# Patient Record
Sex: MALE | Race: WHITE | Employment: FULL TIME | URBAN - METROPOLITAN AREA
[De-identification: names, ages, dates, MRNs, and addresses within clinical notes are randomized per-mention and may not be internally consistent; named-entity substitution may affect disease eponyms.]

---

## 2018-07-23 ENCOUNTER — OFFICE VISIT (OUTPATIENT)
Dept: FAMILY MEDICINE CLINIC | Age: 53
End: 2018-07-23

## 2018-07-23 VITALS
SYSTOLIC BLOOD PRESSURE: 134 MMHG | OXYGEN SATURATION: 99 % | RESPIRATION RATE: 18 BRPM | DIASTOLIC BLOOD PRESSURE: 78 MMHG | HEART RATE: 74 BPM | WEIGHT: 234 LBS | BODY MASS INDEX: 30.03 KG/M2 | HEIGHT: 74 IN

## 2018-07-23 DIAGNOSIS — I10 ESSENTIAL HYPERTENSION: ICD-10-CM

## 2018-07-23 DIAGNOSIS — G60.0 CHARCOT-MARIE-TOOTH DISEASE: ICD-10-CM

## 2018-07-23 DIAGNOSIS — G60.9 HEREDITARY PERIPHERAL NEUROPATHY: ICD-10-CM

## 2018-07-23 DIAGNOSIS — E78.5 HYPERLIPIDEMIA, UNSPECIFIED HYPERLIPIDEMIA TYPE: ICD-10-CM

## 2018-07-23 PROCEDURE — 1036F TOBACCO NON-USER: CPT | Performed by: NURSE PRACTITIONER

## 2018-07-23 PROCEDURE — 3017F COLORECTAL CA SCREEN DOC REV: CPT | Performed by: NURSE PRACTITIONER

## 2018-07-23 PROCEDURE — G8417 CALC BMI ABV UP PARAM F/U: HCPCS | Performed by: NURSE PRACTITIONER

## 2018-07-23 PROCEDURE — G8427 DOCREV CUR MEDS BY ELIG CLIN: HCPCS | Performed by: NURSE PRACTITIONER

## 2018-07-23 PROCEDURE — 99203 OFFICE O/P NEW LOW 30 MIN: CPT | Performed by: NURSE PRACTITIONER

## 2018-07-23 RX ORDER — LISINOPRIL AND HYDROCHLOROTHIAZIDE 12.5; 1 MG/1; MG/1
1 TABLET ORAL DAILY
Qty: 90 TABLET | Refills: 1 | Status: SHIPPED | OUTPATIENT
Start: 2018-07-23 | End: 2019-02-02 | Stop reason: SDUPTHER

## 2018-07-23 RX ORDER — M-VIT,TX,IRON,MINS/CALC/FOLIC 27MG-0.4MG
1 TABLET ORAL DAILY
COMMUNITY

## 2018-07-23 RX ORDER — LISINOPRIL AND HYDROCHLOROTHIAZIDE 12.5; 1 MG/1; MG/1
1 TABLET ORAL DAILY
COMMUNITY
End: 2018-07-23 | Stop reason: SDUPTHER

## 2018-07-23 ASSESSMENT — ENCOUNTER SYMPTOMS
ORTHOPNEA: 0
COUGH: 0
CONSTIPATION: 0
SHORTNESS OF BREATH: 0
BLOOD IN STOOL: 0
DIARRHEA: 0
GASTROINTESTINAL NEGATIVE: 1
ABDOMINAL PAIN: 0

## 2018-07-23 ASSESSMENT — PATIENT HEALTH QUESTIONNAIRE - PHQ9
1. LITTLE INTEREST OR PLEASURE IN DOING THINGS: 0
SUM OF ALL RESPONSES TO PHQ9 QUESTIONS 1 & 2: 0
2. FEELING DOWN, DEPRESSED OR HOPELESS: 0
SUM OF ALL RESPONSES TO PHQ QUESTIONS 1-9: 0

## 2018-07-23 NOTE — PROGRESS NOTES
Michele Bamberger 46 y.o. male    Chief Complaint   Patient presents with    Hypertension    New Patient       HPI     New patient. HTN, on lisinopril- HCTZ 10-12.5 mg, running out, was saving medication, not taking as prescribed, /78 today. Tolerating medications, no chest pains, no SOB, edema. Hereditary peripheral neuropathy, Charcot-Liliana-Tooth disease, sees neurologist-has paresthesias in BUE, no weakness. Past Medical History:   Diagnosis Date    Charcot-Liliana-Tooth disease     Hyperlipemia     Hypertension      Past Surgical History:   Procedure Laterality Date    BREAST BIOPSY      COLONOSCOPY      repeat in 2022    VASECTOMY       No Known Allergies  Outpatient Prescriptions Marked as Taking for the 7/23/18 encounter (Office Visit) with GERRY Barrera CNP   Medication Sig Dispense Refill    Multiple Vitamins-Minerals (THERAPEUTIC MULTIVITAMIN-MINERALS) tablet Take 1 tablet by mouth daily      vitamin D (CHOLECALCIFEROL) 1000 UNIT TABS tablet Take 1,000 Units by mouth daily      lisinopril-hydrochlorothiazide (PRINZIDE;ZESTORETIC) 10-12.5 MG per tablet Take 1 tablet by mouth daily 90 tablet 1     Social History   Substance Use Topics    Smoking status: Never Smoker    Smokeless tobacco: Never Used    Alcohol use Yes     Family History   Problem Relation Age of Onset    Cancer Father         small cell carcinoma     Heart Disease Father         CHF    Other Mother         PAD, smoker, CMT disease    Diabetes Mother        /78   Pulse 74   Resp 18   Ht 6' 2\" (1.88 m)   Wt 234 lb (106.1 kg)   SpO2 99%   BMI 30.04 kg/m²   Weight: 234 lb (106.1 kg)     Past medical, surgical, family and social history were reviewed and updated with the patient. Review of Systems   Constitutional: Negative for malaise/fatigue. Respiratory: Negative for cough and shortness of breath.     Cardiovascular: Negative for chest pain, palpitations, orthopnea, claudication, leg swelling and PND. Gastrointestinal: Negative. Negative for abdominal pain, blood in stool, constipation and diarrhea. Genitourinary: Negative. Skin: Negative for rash. Neurological: Positive for tingling. Negative for dizziness, focal weakness, weakness and headaches. Psychiatric/Behavioral: Negative for depression. The patient is not nervous/anxious. Physical Exam   Constitutional: He is oriented to person, place, and time. He appears well-developed and well-nourished. No distress. Neck: Neck supple. No thyromegaly present. Cardiovascular: Normal rate, regular rhythm, normal heart sounds and intact distal pulses. No murmur heard. Pulmonary/Chest: Effort normal and breath sounds normal.   Abdominal: Soft. Bowel sounds are normal.   Lymphadenopathy:     He has no cervical adenopathy. Neurological: He is alert and oriented to person, place, and time. He displays atrophy. Abnormal muscle tone: BLE. Psychiatric: He has a normal mood and affect. His behavior is normal.   Nursing note and vitals reviewed. Assessment    1. Essential hypertension    2. Charcot-Liliana-Tooth disease    3. Hereditary peripheral neuropathy    4. Hyperlipidemia, unspecified hyperlipidemia type        Plan    Andrew Merchant was seen today for hypertension and new patient. Diagnoses and all orders for this visit:    Essential hypertension  -     Continue current, continue working on weight loss  -     lisinopril-hydrochlorothiazide (PRINZIDE;ZESTORETIC) 10-12.5 MG per tablet; Take 1 tablet by mouth daily    Charcot-Liliana-Tooth disease  Hereditary peripheral neuropathy         -    Sees neurologist     Hyperlipidemia, unspecified hyperlipidemia type          -   Counseled on diet changes, will recheck during physical in Sept, 2018.

## 2018-09-18 ENCOUNTER — OFFICE VISIT (OUTPATIENT)
Dept: FAMILY MEDICINE CLINIC | Age: 53
End: 2018-09-18

## 2018-09-18 VITALS
RESPIRATION RATE: 16 BRPM | HEIGHT: 74 IN | BODY MASS INDEX: 29.52 KG/M2 | DIASTOLIC BLOOD PRESSURE: 78 MMHG | SYSTOLIC BLOOD PRESSURE: 128 MMHG | WEIGHT: 230 LBS | HEART RATE: 70 BPM | OXYGEN SATURATION: 98 %

## 2018-09-18 DIAGNOSIS — I10 ESSENTIAL HYPERTENSION: ICD-10-CM

## 2018-09-18 DIAGNOSIS — L97.511 SKIN ULCER OF TOE OF RIGHT FOOT, LIMITED TO BREAKDOWN OF SKIN (HCC): ICD-10-CM

## 2018-09-18 DIAGNOSIS — G60.0 CHARCOT-MARIE-TOOTH DISEASE: ICD-10-CM

## 2018-09-18 DIAGNOSIS — L84 CALLUS OF FOOT: ICD-10-CM

## 2018-09-18 DIAGNOSIS — G60.9 HEREDITARY PERIPHERAL NEUROPATHY: ICD-10-CM

## 2018-09-18 DIAGNOSIS — Z00.00 PHYSICAL EXAM: ICD-10-CM

## 2018-09-18 LAB
A/G RATIO: 1.9 (ref 1.1–2.2)
ALBUMIN SERPL-MCNC: 4.7 G/DL (ref 3.4–5)
ALP BLD-CCNC: 58 U/L (ref 40–129)
ALT SERPL-CCNC: 23 U/L (ref 10–40)
ANION GAP SERPL CALCULATED.3IONS-SCNC: 12 MMOL/L (ref 3–16)
AST SERPL-CCNC: 21 U/L (ref 15–37)
BASOPHILS ABSOLUTE: 0 K/UL (ref 0–0.2)
BASOPHILS RELATIVE PERCENT: 0.7 %
BILIRUB SERPL-MCNC: 0.4 MG/DL (ref 0–1)
BUN BLDV-MCNC: 17 MG/DL (ref 7–20)
CALCIUM SERPL-MCNC: 9.5 MG/DL (ref 8.3–10.6)
CHLORIDE BLD-SCNC: 100 MMOL/L (ref 99–110)
CHOLESTEROL, TOTAL: 193 MG/DL (ref 0–199)
CO2: 28 MMOL/L (ref 21–32)
CREAT SERPL-MCNC: 0.9 MG/DL (ref 0.9–1.3)
EOSINOPHILS ABSOLUTE: 0.1 K/UL (ref 0–0.6)
EOSINOPHILS RELATIVE PERCENT: 1.3 %
GFR AFRICAN AMERICAN: >60
GFR NON-AFRICAN AMERICAN: >60
GLOBULIN: 2.5 G/DL
GLUCOSE BLD-MCNC: 106 MG/DL (ref 70–99)
HCT VFR BLD CALC: 44.9 % (ref 40.5–52.5)
HDLC SERPL-MCNC: 48 MG/DL (ref 40–60)
HEMOGLOBIN: 14.9 G/DL (ref 13.5–17.5)
HEPATITIS C ANTIBODY INTERPRETATION: NORMAL
LDL CHOLESTEROL CALCULATED: 109 MG/DL
LYMPHOCYTES ABSOLUTE: 2.5 K/UL (ref 1–5.1)
LYMPHOCYTES RELATIVE PERCENT: 38.9 %
MCH RBC QN AUTO: 30.6 PG (ref 26–34)
MCHC RBC AUTO-ENTMCNC: 33.1 G/DL (ref 31–36)
MCV RBC AUTO: 92.3 FL (ref 80–100)
MONOCYTES ABSOLUTE: 0.6 K/UL (ref 0–1.3)
MONOCYTES RELATIVE PERCENT: 8.9 %
NEUTROPHILS ABSOLUTE: 3.2 K/UL (ref 1.7–7.7)
NEUTROPHILS RELATIVE PERCENT: 50.2 %
PDW BLD-RTO: 13 % (ref 12.4–15.4)
PLATELET # BLD: 238 K/UL (ref 135–450)
PMV BLD AUTO: 8.4 FL (ref 5–10.5)
POTASSIUM SERPL-SCNC: 4.6 MMOL/L (ref 3.5–5.1)
PROSTATE SPECIFIC ANTIGEN: 1.28 NG/ML (ref 0–4)
RBC # BLD: 4.86 M/UL (ref 4.2–5.9)
SODIUM BLD-SCNC: 140 MMOL/L (ref 136–145)
TOTAL PROTEIN: 7.2 G/DL (ref 6.4–8.2)
TRIGL SERPL-MCNC: 179 MG/DL (ref 0–150)
TSH SERPL DL<=0.05 MIU/L-ACNC: 1.91 UIU/ML (ref 0.27–4.2)
VITAMIN B-12: 515 PG/ML (ref 211–911)
VITAMIN D 25-HYDROXY: 50.5 NG/ML
VLDLC SERPL CALC-MCNC: 36 MG/DL
WBC # BLD: 6.5 K/UL (ref 4–11)

## 2018-09-18 PROCEDURE — 36415 COLL VENOUS BLD VENIPUNCTURE: CPT | Performed by: NURSE PRACTITIONER

## 2018-09-18 PROCEDURE — 99396 PREV VISIT EST AGE 40-64: CPT | Performed by: NURSE PRACTITIONER

## 2018-09-18 NOTE — PROGRESS NOTES
History and Physical      Jodi Roblero  YOB: 1965    Date of Service:  9/18/2018    Chief Complaint:   Jodi Roblero is a 46 y.o. male who presents for complete physical examination. HPI:     HM reviewed. Not interested in flu vaccine. Due for blood work. Dental exam: due  Eye exam- glasses, 1.5years ago    HTN, on lisinopril- HCTZ 10-12.5 mg. No chest pains, no SOB, edema. Hereditary peripheral neuropathy, Charcot-Liliana-Tooth disease, sees neurologist-has paresthesias in BUE, no weakness. Left foot great toe with some sore, saw neurologist- was referred to podiatrist at [address] [place] [address]- wants a closer one.  Sore for a year, at times bleeds.       PHQ Scores 7/23/2018   PHQ2 Score 0   PHQ9 Score 0     Interpretation of Total Score Depression Severity: 1-4 = Minimal depression, 5-9 = Mild depression, 10-14 = Moderate depression, 15-19 = Moderately severe depression, 20-27 = Severe depression      Wt Readings from Last 3 Encounters:   09/18/18 230 lb (104.3 kg)   07/23/18 234 lb (106.1 kg)     BP Readings from Last 3 Encounters:   09/18/18 134/80   07/23/18 134/78       Patient Active Problem List   Diagnosis    Essential hypertension    Hereditary peripheral neuropathy       Preventive Care:  Health Maintenance   Topic Date Due    Potassium monitoring  1965    Creatinine monitoring  1965    Hepatitis C screen  1965    HIV screen  11/15/1980    Lipid screen  11/15/2005    Diabetes screen  11/15/2005    Shingles Vaccine (1 of 2 - 2 Dose Series) 11/15/2015    Flu vaccine (1) 09/25/2018 (Originally 9/1/2018)    DTaP/Tdap/Td vaccine (2 - Td) 02/10/2021    Colon cancer screen colonoscopy  11/30/2027        Lipid panel:  No results found for: CHOL, TRIG, HDL, LDLCALC, LDLDIRECT      Immunization History   Administered Date(s) Administered    Tdap (Boostrix, Adacel) 02/10/2011       No Known Allergies  Outpatient Prescriptions Marked as Taking for the 9/18/18 encounter (Office Visit) with Nate GERRY Escalona - CNP   Medication Sig Dispense Refill    Multiple Vitamins-Minerals (THERAPEUTIC MULTIVITAMIN-MINERALS) tablet Take 1 tablet by mouth daily      vitamin D (CHOLECALCIFEROL) 1000 UNIT TABS tablet Take 1,000 Units by mouth daily      lisinopril-hydrochlorothiazide (PRINZIDE;ZESTORETIC) 10-12.5 MG per tablet Take 1 tablet by mouth daily 90 tablet 1       Past Medical History:   Diagnosis Date    Charcot-Liliana-Tooth disease     Hyperlipemia     Hypertension      Past Surgical History:   Procedure Laterality Date    BREAST BIOPSY      COLONOSCOPY      repeat in 2022    VASECTOMY       Family History   Problem Relation Age of Onset    Cancer Father         small cell carcinoma     Heart Disease Father         CHF    Other Mother         PAD, smoker, CMT disease    Diabetes Mother      Social History     Social History    Marital status:      Spouse name: N/A    Number of children: 2    Years of education: HS     Occupational History    Not on file. Social History Main Topics    Smoking status: Never Smoker    Smokeless tobacco: Never Used    Alcohol use Yes    Drug use: No    Sexual activity: Yes     Other Topics Concern    Not on file     Social History Narrative    No narrative on file       Review of Systems:  A comprehensive review of systems was negative except for what was noted in the HPI. Physical Exam:   Vitals:    09/18/18 0759   BP: 134/80   Site: Left Upper Arm   Position: Sitting   Cuff Size: Large Adult   Pulse: 70   Resp: 16   SpO2: 98%   Weight: 230 lb (104.3 kg)   Height: 6' 2\" (1.88 m)     Body mass index is 29.53 kg/m². Constitutional: He is oriented to person, place, and time. He appears well-developed and well-nourished. HEENT:   Head: Normocephalic and atraumatic.    Right Ear: Tympanic membrane, external ear and ear canal normal.   Left Ear: Tympanic membrane, external ear and ear canal normal.   Nose: Nose normal.   Mouth/Throat:

## 2018-09-19 DIAGNOSIS — R73.09 ELEVATED GLUCOSE: Primary | ICD-10-CM

## 2018-09-19 DIAGNOSIS — R73.09 ELEVATED GLUCOSE: ICD-10-CM

## 2018-09-19 LAB
ESTIMATED AVERAGE GLUCOSE: 102.5 MG/DL
HBA1C MFR BLD: 5.2 %

## 2019-02-02 DIAGNOSIS — I10 ESSENTIAL HYPERTENSION: ICD-10-CM

## 2019-02-04 RX ORDER — LISINOPRIL AND HYDROCHLOROTHIAZIDE 12.5; 1 MG/1; MG/1
TABLET ORAL
Qty: 90 TABLET | Refills: 0 | Status: SHIPPED | OUTPATIENT
Start: 2019-02-04 | End: 2019-05-03 | Stop reason: SDUPTHER

## 2019-04-09 ENCOUNTER — OFFICE VISIT (OUTPATIENT)
Dept: FAMILY MEDICINE CLINIC | Age: 54
End: 2019-04-09
Payer: COMMERCIAL

## 2019-04-09 VITALS
SYSTOLIC BLOOD PRESSURE: 130 MMHG | OXYGEN SATURATION: 99 % | HEART RATE: 64 BPM | RESPIRATION RATE: 16 BRPM | DIASTOLIC BLOOD PRESSURE: 84 MMHG | BODY MASS INDEX: 30.93 KG/M2 | WEIGHT: 241 LBS | HEIGHT: 74 IN

## 2019-04-09 DIAGNOSIS — I10 ESSENTIAL HYPERTENSION: ICD-10-CM

## 2019-04-09 DIAGNOSIS — E04.9 THYROID ENLARGEMENT: ICD-10-CM

## 2019-04-09 DIAGNOSIS — R10.32 LLQ ABDOMINAL PAIN: ICD-10-CM

## 2019-04-09 LAB
A/G RATIO: 1.8 (ref 1.1–2.2)
ALBUMIN SERPL-MCNC: 4.7 G/DL (ref 3.4–5)
ALP BLD-CCNC: 58 U/L (ref 40–129)
ALT SERPL-CCNC: 28 U/L (ref 10–40)
ANION GAP SERPL CALCULATED.3IONS-SCNC: 13 MMOL/L (ref 3–16)
AST SERPL-CCNC: 23 U/L (ref 15–37)
BASOPHILS ABSOLUTE: 0 K/UL (ref 0–0.2)
BASOPHILS RELATIVE PERCENT: 0.7 %
BILIRUB SERPL-MCNC: 0.6 MG/DL (ref 0–1)
BILIRUBIN, POC: NORMAL
BLOOD URINE, POC: NORMAL
BUN BLDV-MCNC: 19 MG/DL (ref 7–20)
CALCIUM SERPL-MCNC: 9.7 MG/DL (ref 8.3–10.6)
CHLORIDE BLD-SCNC: 103 MMOL/L (ref 99–110)
CLARITY, POC: CLEAR
CO2: 25 MMOL/L (ref 21–32)
COLOR, POC: YELLOW
CREAT SERPL-MCNC: 0.8 MG/DL (ref 0.9–1.3)
EOSINOPHILS ABSOLUTE: 0.1 K/UL (ref 0–0.6)
EOSINOPHILS RELATIVE PERCENT: 1.1 %
GFR AFRICAN AMERICAN: >60
GFR NON-AFRICAN AMERICAN: >60
GLOBULIN: 2.6 G/DL
GLUCOSE BLD-MCNC: 112 MG/DL (ref 70–99)
GLUCOSE URINE, POC: NORMAL
HCT VFR BLD CALC: 44.7 % (ref 40.5–52.5)
HEMOGLOBIN: 14.8 G/DL (ref 13.5–17.5)
KETONES, POC: NORMAL
LEUKOCYTE EST, POC: NORMAL
LYMPHOCYTES ABSOLUTE: 2.4 K/UL (ref 1–5.1)
LYMPHOCYTES RELATIVE PERCENT: 35 %
MCH RBC QN AUTO: 30.8 PG (ref 26–34)
MCHC RBC AUTO-ENTMCNC: 33.2 G/DL (ref 31–36)
MCV RBC AUTO: 92.8 FL (ref 80–100)
MONOCYTES ABSOLUTE: 0.6 K/UL (ref 0–1.3)
MONOCYTES RELATIVE PERCENT: 9.2 %
NEUTROPHILS ABSOLUTE: 3.7 K/UL (ref 1.7–7.7)
NEUTROPHILS RELATIVE PERCENT: 54 %
NITRITE, POC: NORMAL
PDW BLD-RTO: 13.8 % (ref 12.4–15.4)
PH, POC: 5.5
PLATELET # BLD: 238 K/UL (ref 135–450)
PMV BLD AUTO: 8.3 FL (ref 5–10.5)
POTASSIUM SERPL-SCNC: 4.8 MMOL/L (ref 3.5–5.1)
PROTEIN, POC: NORMAL
RBC # BLD: 4.81 M/UL (ref 4.2–5.9)
SODIUM BLD-SCNC: 141 MMOL/L (ref 136–145)
SPECIFIC GRAVITY, POC: 1.02
T4 FREE: 1.1 NG/DL (ref 0.9–1.8)
TOTAL PROTEIN: 7.3 G/DL (ref 6.4–8.2)
TSH SERPL DL<=0.05 MIU/L-ACNC: 1.87 UIU/ML (ref 0.27–4.2)
UROBILINOGEN, POC: 0.2
WBC # BLD: 6.9 K/UL (ref 4–11)

## 2019-04-09 PROCEDURE — 81002 URINALYSIS NONAUTO W/O SCOPE: CPT | Performed by: NURSE PRACTITIONER

## 2019-04-09 PROCEDURE — 99214 OFFICE O/P EST MOD 30 MIN: CPT | Performed by: NURSE PRACTITIONER

## 2019-04-09 ASSESSMENT — PATIENT HEALTH QUESTIONNAIRE - PHQ9
SUM OF ALL RESPONSES TO PHQ QUESTIONS 1-9: 0
SUM OF ALL RESPONSES TO PHQ9 QUESTIONS 1 & 2: 0
1. LITTLE INTEREST OR PLEASURE IN DOING THINGS: 0
2. FEELING DOWN, DEPRESSED OR HOPELESS: 0
SUM OF ALL RESPONSES TO PHQ QUESTIONS 1-9: 0

## 2019-04-09 ASSESSMENT — ENCOUNTER SYMPTOMS
NAUSEA: 0
DIARRHEA: 0
CHEST TIGHTNESS: 0
BLOOD IN STOOL: 0
WHEEZING: 0
ABDOMINAL PAIN: 1
SHORTNESS OF BREATH: 0
COUGH: 0
CONSTIPATION: 0
VOMITING: 0

## 2019-04-09 NOTE — PROGRESS NOTES
tenderness (mild) in the left lower quadrant. There is no rigidity, no rebound, no guarding and no CVA tenderness. Lymphadenopathy:     He has no cervical adenopathy. Neurological: He is alert and oriented to person, place, and time. Psychiatric: His behavior is normal.   Nursing note and vitals reviewed. Vitals:    04/09/19 0839   BP: 130/84   Pulse: 64   Resp: 16   SpO2: 99%       Assessment    1. LLQ abdominal pain    2. Essential hypertension    3. Thyroid enlargement        Plan    Shaniqua Mckenzie was seen today for hypertension and abdominal pain. Diagnoses and all orders for this visit:    LLQ abdominal pain  -     CT ABDOMEN PELVIS W IV CONTRAST Additional Contrast? Oral; Future  -     CBC Auto Differential  -     COMPREHENSIVE METABOLIC PANEL  -     POCT Urinalysis no Micro        -     Unclear etiology, mild tenderness.      Essential hypertension        -     Continue current, work on weight loss, low salt diet    Thyroid enlargement  -     TSH without Reflex  -     T4, FREE

## 2019-04-22 ENCOUNTER — TELEPHONE (OUTPATIENT)
Dept: FAMILY MEDICINE CLINIC | Age: 54
End: 2019-04-22

## 2019-05-06 ENCOUNTER — TELEPHONE (OUTPATIENT)
Dept: FAMILY MEDICINE CLINIC | Age: 54
End: 2019-05-06

## 2019-05-06 DIAGNOSIS — E04.1 THYROID NODULE: Primary | ICD-10-CM

## 2019-05-06 NOTE — TELEPHONE ENCOUNTER
Reviewed CT scan of abd results:    No findings to account for left lower abd pain. 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 These growths usually do not cause any symptoms. If develops left flank pain to let me know. US/thyroid results:  Thyroid is enlarged. Could be seen in thyroiditis, inflammation of thyroid- though your thyroid labs are fine- needs to be monitored periodically. Multiple colloid cysts- benign. Right lobe with 2.2 cm nodule and left lobe with 1.3cm. I would like you to see endocrinologist at your convenience to make sure these do not need to be biopsied vs just monitored with ultrasound.   Referral placed- can see any endocrinologist:    [facility]) - Endocrinology and Diabetes- Bryant Garcia MD  [address] [place] [address], [address], [address]  Phone: [phone]

## 2019-05-06 NOTE — TELEPHONE ENCOUNTER
Pt calling back and states he was not informed and would like a call back today regarding CT and ABD result's ?   Please advise

## 2019-05-08 ENCOUNTER — OFFICE VISIT (OUTPATIENT)
Dept: ENDOCRINOLOGY | Age: 54
End: 2019-05-08
Payer: COMMERCIAL

## 2019-05-08 VITALS
OXYGEN SATURATION: 97 % | HEART RATE: 107 BPM | DIASTOLIC BLOOD PRESSURE: 86 MMHG | SYSTOLIC BLOOD PRESSURE: 137 MMHG | WEIGHT: 246.4 LBS | BODY MASS INDEX: 31.62 KG/M2 | HEIGHT: 74 IN

## 2019-05-08 DIAGNOSIS — E04.2 MULTIPLE THYROID NODULES: Primary | ICD-10-CM

## 2019-05-08 PROCEDURE — 99243 OFF/OP CNSLTJ NEW/EST LOW 30: CPT | Performed by: INTERNAL MEDICINE

## 2019-05-08 NOTE — Clinical Note
Thank you so much for involving me in the care of your patient. Please review the enclosed note.  Silvano Koehler

## 2019-05-08 NOTE — PROGRESS NOTES
Endocrinology       New Patient Consultation  Taryn Andujar M.D. Phone: [phone]   FAX: [phone]       Silver Gonzalez   YOB: 1965    Date of Visit:  5/8/2019    No Known Allergies  Outpatient Medications Marked as Taking for the 5/8/19 encounter (Office Visit) with Jessica Peterson MD   Medication Sig Dispense Refill    lisinopril-hydrochlorothiazide (PRINZIDE;ZESTORETIC) 10-12.5 MG per tablet TAKE ONE TABLET BY MOUTH DAILY 90 tablet 1    Multiple Vitamins-Minerals (THERAPEUTIC MULTIVITAMIN-MINERALS) tablet Take 1 tablet by mouth daily      vitamin D (CHOLECALCIFEROL) 1000 UNIT TABS tablet Take 1,000 Units by mouth daily           Vitals:    05/08/19 1305 05/08/19 1308   BP: (!) 155/86 137/86   Site: Right Upper Arm Left Upper Arm   Position: Sitting Sitting   Cuff Size: Large Adult Large Adult   Pulse: 107    SpO2: 97%    Weight: 246 lb 6.4 oz (111.8 kg)    Height: 6' 2\" (1.88 m)      Body mass index is 31.64 kg/m². Wt Readings from Last 3 Encounters:   05/08/19 246 lb 6.4 oz (111.8 kg)   04/09/19 241 lb (109.3 kg)   09/18/18 230 lb (104.3 kg)     BP Readings from Last 3 Encounters:   05/08/19 137/86   04/09/19 130/84   09/18/18 128/78        Past Medical History:   Diagnosis Date    Charcot-Liliana-Tooth disease     Hyperlipemia     Hypertension      Past Surgical History:   Procedure Laterality Date    BREAST BIOPSY      COLONOSCOPY      repeat in 2022    VASECTOMY       Family History   Problem Relation Age of Onset    Cancer Father         small cell carcinoma     Heart Disease Father         CHF    Other Mother         PAD, smoker, CMT disease    Diabetes Mother      Social History     Tobacco Use   Smoking Status Never Smoker   Smokeless Tobacco Never Used      Social History     Substance and Sexual Activity   Alcohol Use Yes       HPI    Silver Gonzalez is a 48 y.o. male who is here for initial evaluation of thyroid nodule.     Patient is being seen at the request of  [facility], APRN - CNP     Patient has a PMH of HTN, HLP    Patient had Thyroid US done in 04/30/19 at [facility] which showed nodules in both lobes. In right lobe , dominant nodule measures 2.2 cm x 1.6 cm   7 mm nodule also seen in the R lobe. In left lobe , dominant nodule measures 1.3 cm     Obstructive symptoms: No difficulty swallowing. No difficulty breathing. No FH of thyroid cancer  No History of exposure to radiation as a child. Review of system Please see the scanned document filled by the patient, reviewed  by me today. Physical Exam   Constitutional: He is oriented to person, place, and time. He appears well-developed. No distress. HENT:   Mouth/Throat: Oropharynx is clear and moist.   Eyes: EOM are normal.   Neck: Thyromegaly present. Cardiovascular: Normal rate and normal heart sounds. Pulmonary/Chest: Effort normal. No respiratory distress. He has no wheezes. Abdominal: Soft. Bowel sounds are normal. There is no tenderness. Musculoskeletal: He exhibits no edema. Neurological: He is alert and oriented to person, place, and time. Skin: Skin is warm and dry. He is not diaphoretic. Psychiatric: His behavior is normal. Thought content normal.         Assessment/Plan      1. Thyroid nodules    This 48 yrs old male was found to have multiple thyroid nodules on US done at [facility]. Both lobes enlarged and heterogenous in appearance. He is euthyroid . TSH 1.87 in 04/19    Images not available to review. Dominant nodule in Right lobe is 2.2 cm and left lobe 1.3 cm   Both nodules are mixed solid cystic. Possible calcification in R lobe nodule. Surveillance was recommended by the radiologist. FNA was not recommended. Will repeat US in 1 year again. Will also ask him to get a copy of his US from [facility] on a disc for me to review. Will check TPO antibodies.        2. HTN/HLP as per PCP

## 2019-06-07 ENCOUNTER — TELEPHONE (OUTPATIENT)
Dept: FAMILY MEDICINE CLINIC | Age: 54
End: 2019-06-07

## 2019-06-07 DIAGNOSIS — I10 ESSENTIAL HYPERTENSION: ICD-10-CM

## 2019-06-07 RX ORDER — LISINOPRIL AND HYDROCHLOROTHIAZIDE 12.5; 1 MG/1; MG/1
TABLET ORAL
Qty: 90 TABLET | Refills: 1 | Status: SHIPPED | OUTPATIENT
Start: 2019-06-07 | End: 2019-12-10 | Stop reason: SDUPTHER

## 2019-06-07 NOTE — TELEPHONE ENCOUNTER
Patient requesting a medication refill.   Medication: lisinopril-hydrochlorothiazide (PRINZIDE;ZESTORETIC) 10-12.5 MG per tablet  Pharmacy: [facility], [address] [facility] [phone] [facility] [phone]   Last office visit: 4/9/2019  Next office visit: 10/7/2019  Patient requesting a 90 day supply of the medication

## 2019-10-07 ENCOUNTER — OFFICE VISIT (OUTPATIENT)
Dept: FAMILY MEDICINE CLINIC | Age: 54
End: 2019-10-07
Payer: COMMERCIAL

## 2019-10-07 VITALS
RESPIRATION RATE: 16 BRPM | HEART RATE: 88 BPM | WEIGHT: 254 LBS | OXYGEN SATURATION: 98 % | DIASTOLIC BLOOD PRESSURE: 78 MMHG | HEIGHT: 74 IN | BODY MASS INDEX: 32.6 KG/M2 | SYSTOLIC BLOOD PRESSURE: 128 MMHG

## 2019-10-07 DIAGNOSIS — Z00.00 PHYSICAL EXAM: ICD-10-CM

## 2019-10-07 DIAGNOSIS — I10 ESSENTIAL HYPERTENSION: ICD-10-CM

## 2019-10-07 DIAGNOSIS — G60.0 CHARCOT-MARIE-TOOTH DISEASE: ICD-10-CM

## 2019-10-07 DIAGNOSIS — E78.5 HYPERLIPIDEMIA, UNSPECIFIED HYPERLIPIDEMIA TYPE: ICD-10-CM

## 2019-10-07 DIAGNOSIS — R07.9 CHEST PAIN, UNSPECIFIED TYPE: ICD-10-CM

## 2019-10-07 DIAGNOSIS — G60.9 HEREDITARY PERIPHERAL NEUROPATHY: ICD-10-CM

## 2019-10-07 DIAGNOSIS — E66.09 CLASS 1 OBESITY DUE TO EXCESS CALORIES WITH SERIOUS COMORBIDITY AND BODY MASS INDEX (BMI) OF 32.0 TO 32.9 IN ADULT: ICD-10-CM

## 2019-10-07 PROCEDURE — 99396 PREV VISIT EST AGE 40-64: CPT | Performed by: NURSE PRACTITIONER

## 2019-10-07 PROCEDURE — 93000 ELECTROCARDIOGRAM COMPLETE: CPT | Performed by: NURSE PRACTITIONER

## 2019-10-08 LAB
CHOLESTEROL, TOTAL: 195 MG/DL (ref 0–199)
ESTIMATED AVERAGE GLUCOSE: 116.9 MG/DL
GLUCOSE BLD-MCNC: 104 MG/DL (ref 70–99)
HBA1C MFR BLD: 5.7 %
HDLC SERPL-MCNC: 41 MG/DL (ref 40–60)
LDL CHOLESTEROL CALCULATED: 95 MG/DL
PROSTATE SPECIFIC ANTIGEN: 1.24 NG/ML (ref 0–4)
TRIGL SERPL-MCNC: 294 MG/DL (ref 0–150)
VLDLC SERPL CALC-MCNC: 59 MG/DL

## 2019-12-10 DIAGNOSIS — I10 ESSENTIAL HYPERTENSION: ICD-10-CM

## 2019-12-10 RX ORDER — LISINOPRIL AND HYDROCHLOROTHIAZIDE 12.5; 1 MG/1; MG/1
TABLET ORAL
Qty: 90 TABLET | Refills: 1 | Status: SHIPPED | OUTPATIENT
Start: 2019-12-10 | End: 2020-03-04 | Stop reason: SDUPTHER

## 2019-12-18 ENCOUNTER — HOSPITAL ENCOUNTER (OUTPATIENT)
Dept: CARDIOLOGY | Age: 54
Discharge: HOME | End: 2019-12-18
Payer: COMMERCIAL

## 2019-12-18 DIAGNOSIS — R07.9 CHEST PAIN, UNSPECIFIED TYPE: ICD-10-CM

## 2019-12-18 LAB
LV EF: 50 %
LVEF MODALITY: NORMAL

## 2019-12-18 PROCEDURE — 93320 DOPPLER ECHO COMPLETE: CPT

## 2019-12-18 PROCEDURE — 93351 STRESS TTE COMPLETE: CPT

## 2019-12-19 DIAGNOSIS — R07.9 CHEST PAIN ON EXERTION: Primary | ICD-10-CM

## 2020-01-15 PROBLEM — E78.2 MIXED HYPERLIPIDEMIA: Status: ACTIVE | Noted: 2020-01-15

## 2020-01-15 NOTE — PROGRESS NOTES
[facility]   CARDIAC EVALUATION NOTE  [phone]      PCP:  GERRY Meng CNP    Reason for Consultation/Chief Complaint: chest pain and sob    Subjective   History of Present Illness:  Macie Arthur is a 47 y.o. patient with a history of Charcot-Liliana-Tooth disease since 2014, HTN and HLD who presents with abnormal testing. His stress echo from 12/18/19 showed a technically difficult study due to poor acoustic window. No noted ischemia. Her doppler portion showed her EF was 55%. He reports CP or heart pain for the last few yrs. He feels his breathing is worse. He has SOB with acitivity. He tries to remain active as he is a . He denies smoking. He drinks alcohol occasionally. He denies DM, strokes or MI. Past Medical History:   has a past medical history of Charcot-Liliana-Tooth disease, Hyperlipemia, Hypertension, and Mixed hyperlipidemia. Surgical History:   has a past surgical history that includes Colonoscopy; Vasectomy; and Breast biopsy. Social History:   reports that he has never smoked. He has never used smokeless tobacco. He reports current alcohol use. He reports that he does not use drugs. Family History:  family history includes Cancer in his father; Diabetes in his mother; Heart Disease in his father; Other in his mother. Father with CHF. Home Medications:  Were reviewed and are listed in nursing record and/or below  Prior to Admission medications    Medication Sig Start Date End Date Taking?  Authorizing Provider   lisinopril-hydrochlorothiazide (PRINZIDE;ZESTORETIC) 10-12.5 MG per tablet TAKE ONE TABLET BY MOUTH DAILY 12/10/19  Yes GERRY Rabago CNP   Multiple Vitamins-Minerals (THERAPEUTIC MULTIVITAMIN-MINERALS) tablet Take 1 tablet by mouth daily   Yes Historical Provider, MD   vitamin D (CHOLECALCIFEROL) 1000 UNIT TABS tablet Take 1,000 Units by mouth daily   Yes Historical Provider, MD          Allergies:  Patient has no known allergies. Review of Systems:   A 14 point review of symptoms completed. Pertinent positives identified in the HPI, all other review of symptoms negative as below.       Objective   PHYSICAL EXAM:    Vitals:    01/16/20 0724   BP: 124/70   Pulse: 112   SpO2: 98%    Weight: 249 lb (112.9 kg)       Recheck HR 90bpm    General Appearance:  Alert, cooperative, no distress, appears stated age   Head:  Normocephalic, without obvious abnormality, atraumatic   Eyes:  PERRL, conjunctiva/corneas clear   Nose: Nares normal, no drainage or sinus tenderness   Throat: Lips, mucosa, and tongue normal   Neck: Supple, symmetrical, trachea midline, no adenopathy, thyroid: not enlarged, symmetric, no tenderness/mass/nodules, no carotid bruit or JVD   Lungs:   Clear to auscultation bilaterally, respirations unlabored   Chest Wall:  No deformity or tenderness   Heart:  Regular rate and rhythm, S1, S2 normal, no murmur, rub or gallop   Abdomen:   Soft, non-tender, bowel sounds active all four quadrants,  no masses, no organomegaly   Extremities: Extremities normal, atraumatic, no cyanosis or edema   Pulses: 2+ and symmetric   Skin: Skin color, texture, turgor normal, no rashes or lesions   Pysch: Normal mood and affect   Neurologic: Normal gross motor and sensory exam.         Labs   CBC:   Lab Results   Component Value Date    WBC 6.9 04/09/2019    RBC 4.81 04/09/2019    HGB 14.8 04/09/2019    HCT 44.7 04/09/2019    MCV 92.8 04/09/2019    RDW 13.8 04/09/2019     04/09/2019     CMP:  Lab Results   Component Value Date     04/09/2019    K 4.8 04/09/2019     04/09/2019    CO2 25 04/09/2019    BUN 19 04/09/2019    CREATININE 0.8 04/09/2019    GFRAA >60 04/09/2019    AGRATIO 1.8 04/09/2019    LABGLOM >60 04/09/2019    GLUCOSE 104 10/07/2019    PROT 7.3 04/09/2019    CALCIUM 9.7 04/09/2019    BILITOT 0.6 04/09/2019    ALKPHOS 58 04/09/2019    AST 23 04/09/2019    ALT 28 04/09/2019     PT/INR:  No results found for: PTINR  HgBA1c:  Lab Results   Component Value Date    LABA1C 5.7 10/07/2019     No results found for: CKTOTAL, CKMB, CKMBINDEX, TROPONINI      Cardiac Data     Last EKG: 10/07/19   SR HR 80      Echo:  Stress echo: 12/18/19  Summary   Excellent functional capacity at 10 METS   Normal ekg response      Technically difficult study due to poor acoustic windows   Grossly no obvious wall motion abnormalities were noted to suggest ischemia   Consider additional testing for follow up such as nuclear stress test or   stress echo with contrast  Summary   Technically difficult examination. Normal left ventricle systolic function with an estimated ejection fraction   of 55%. No obvious regional wall motion abnormalities are seen. Normal left ventricular diastolic filling pressure. No significant valvular heart disease noted. Stress Test:    Cath:    Studies:       I have reviewed labs and imaging/xray/diagnostic testing in this note. Assessment        Patient Active Problem List   Diagnosis    Essential hypertension    CMT (Charcot-Liliana-Tooth disease)    Mixed hyperlipidemia    Precordial pain    SOB (shortness of breath)                Plan   1. Stress myoview to evaluate chest pain and shortness of breath. Stress echo was inconclusive due to poor image quality, d/w pt alt is cath but I do not think cath is indicated presently  2. CT calcium score to evaluate chest pain and shortness of breath  3. Discussed cardiac cath/angiogram if testing significantly abnl   4. Discussed possible need for medication adjustment and/or adding statin therapy and aspirin therapy  5. Follow up with NP in 2 months      Scribe's attestation: This note was scribed in the presence of Dr. Zac Mobley by Valdemar Laura RN      Thank you for allowing us to participate in the care of [facility]. Please call me with any questions [phone].     Zac Mobley MD, [address] [facility]

## 2020-01-16 ENCOUNTER — OFFICE VISIT (OUTPATIENT)
Dept: CARDIOLOGY CLINIC | Age: 55
End: 2020-01-16
Payer: COMMERCIAL

## 2020-01-16 VITALS
WEIGHT: 249 LBS | HEART RATE: 112 BPM | DIASTOLIC BLOOD PRESSURE: 70 MMHG | SYSTOLIC BLOOD PRESSURE: 124 MMHG | BODY MASS INDEX: 31.95 KG/M2 | OXYGEN SATURATION: 98 % | HEIGHT: 74 IN

## 2020-01-16 PROBLEM — R07.2 PRECORDIAL PAIN: Status: ACTIVE | Noted: 2020-01-16

## 2020-01-16 PROBLEM — R06.02 SOB (SHORTNESS OF BREATH): Status: ACTIVE | Noted: 2020-01-16

## 2020-01-16 PROCEDURE — 99204 OFFICE O/P NEW MOD 45 MIN: CPT | Performed by: INTERNAL MEDICINE

## 2020-01-16 NOTE — LETTER
Patient has no known allergies. Review of Systems:   A 14 point review of symptoms completed. Pertinent positives identified in the HPI, all other review of symptoms negative as below.       Objective   PHYSICAL EXAM:    Vitals:    01/16/20 0724   BP: 124/70   Pulse: 112   SpO2: 98%    Weight: 249 lb (112.9 kg)       Recheck HR 90bpm    General Appearance:  Alert, cooperative, no distress, appears stated age   Head:  Normocephalic, without obvious abnormality, atraumatic   Eyes:  PERRL, conjunctiva/corneas clear   Nose: Nares normal, no drainage or sinus tenderness   Throat: Lips, mucosa, and tongue normal   Neck: Supple, symmetrical, trachea midline, no adenopathy, thyroid: not enlarged, symmetric, no tenderness/mass/nodules, no carotid bruit or JVD   Lungs:   Clear to auscultation bilaterally, respirations unlabored   Chest Wall:  No deformity or tenderness   Heart:  Regular rate and rhythm, S1, S2 normal, no murmur, rub or gallop   Abdomen:   Soft, non-tender, bowel sounds active all four quadrants,  no masses, no organomegaly   Extremities: Extremities normal, atraumatic, no cyanosis or edema   Pulses: 2+ and symmetric   Skin: Skin color, texture, turgor normal, no rashes or lesions   Pysch: Normal mood and affect   Neurologic: Normal gross motor and sensory exam.         Labs   CBC:   Lab Results   Component Value Date    WBC 6.9 04/09/2019    RBC 4.81 04/09/2019    HGB 14.8 04/09/2019    HCT 44.7 04/09/2019    MCV 92.8 04/09/2019    RDW 13.8 04/09/2019     04/09/2019     CMP:  Lab Results   Component Value Date     04/09/2019    K 4.8 04/09/2019     04/09/2019    CO2 25 04/09/2019    BUN 19 04/09/2019    CREATININE 0.8 04/09/2019    GFRAA >60 04/09/2019    AGRATIO 1.8 04/09/2019    LABGLOM >60 04/09/2019    GLUCOSE 104 10/07/2019    PROT 7.3 04/09/2019    CALCIUM 9.7 04/09/2019    BILITOT 0.6 04/09/2019    ALKPHOS 58 04/09/2019    AST 23 04/09/2019    ALT 28 04/09/2019 PT/INR:  No results found for: PTINR  HgBA1c:  Lab Results   Component Value Date    LABA1C 5.7 10/07/2019     No results found for: CKTOTAL, CKMB, CKMBINDEX, TROPONINI      Cardiac Data     Last EKG: 10/07/19   SR HR 80      Echo:  Stress echo: 12/18/19  Summary   Excellent functional capacity at 10 METS   Normal ekg response      Technically difficult study due to poor acoustic windows   Grossly no obvious wall motion abnormalities were noted to suggest ischemia   Consider additional testing for follow up such as nuclear stress test or   stress echo with contrast  Summary   Technically difficult examination. Normal left ventricle systolic function with an estimated ejection fraction   of 55%. No obvious regional wall motion abnormalities are seen. Normal left ventricular diastolic filling pressure. No significant valvular heart disease noted. Stress Test:    Cath:    Studies:       I have reviewed labs and imaging/xray/diagnostic testing in this note. Assessment        Patient Active Problem List   Diagnosis    Essential hypertension    CMT (Charcot-Liliana-Tooth disease)    Mixed hyperlipidemia    Precordial pain    SOB (shortness of breath)                Plan   1. Stress myoview to evaluate chest pain and shortness of breath. Stress echo was inconclusive due to poor image quality, d/w pt alt is cath but I do not think cath is indicated presently  2. CT calcium score to evaluate chest pain and shortness of breath  3. Discussed cardiac cath/angiogram if testing significantly abnl   4. Discussed possible need for medication adjustment and/or adding statin therapy and aspirin therapy  5. Follow up with NP in 2 months      Scribe's attestation: This note was scribed in the presence of Dr. Devorah Carrera by Alex Diallo RN      Thank you for allowing us to participate in the care of Norma Curtis. Please call me with any questions [phone].     Devorah Carrera MD, [facility] [place]

## 2020-03-04 ENCOUNTER — OFFICE VISIT (OUTPATIENT)
Dept: FAMILY MEDICINE CLINIC | Age: 55
End: 2020-03-04
Payer: COMMERCIAL

## 2020-03-04 VITALS
BODY MASS INDEX: 32.74 KG/M2 | HEIGHT: 73 IN | WEIGHT: 247 LBS | DIASTOLIC BLOOD PRESSURE: 80 MMHG | OXYGEN SATURATION: 98 % | SYSTOLIC BLOOD PRESSURE: 120 MMHG | HEART RATE: 91 BPM

## 2020-03-04 DIAGNOSIS — I10 ESSENTIAL HYPERTENSION: ICD-10-CM

## 2020-03-04 DIAGNOSIS — R73.03 PRE-DIABETES: ICD-10-CM

## 2020-03-04 LAB
A/G RATIO: 1.9 (ref 1.1–2.2)
ALBUMIN SERPL-MCNC: 4.7 G/DL (ref 3.4–5)
ALP BLD-CCNC: 57 U/L (ref 40–129)
ALT SERPL-CCNC: 49 U/L (ref 10–40)
ANION GAP SERPL CALCULATED.3IONS-SCNC: 16 MMOL/L (ref 3–16)
AST SERPL-CCNC: 31 U/L (ref 15–37)
BILIRUB SERPL-MCNC: 0.5 MG/DL (ref 0–1)
BUN BLDV-MCNC: 28 MG/DL (ref 7–20)
CALCIUM SERPL-MCNC: 9.8 MG/DL (ref 8.3–10.6)
CHLORIDE BLD-SCNC: 103 MMOL/L (ref 99–110)
CO2: 24 MMOL/L (ref 21–32)
CREAT SERPL-MCNC: 0.8 MG/DL (ref 0.9–1.3)
GFR AFRICAN AMERICAN: >60
GFR NON-AFRICAN AMERICAN: >60
GLOBULIN: 2.5 G/DL
GLUCOSE BLD-MCNC: 105 MG/DL (ref 70–99)
POTASSIUM SERPL-SCNC: 4.7 MMOL/L (ref 3.5–5.1)
SODIUM BLD-SCNC: 143 MMOL/L (ref 136–145)
TOTAL PROTEIN: 7.2 G/DL (ref 6.4–8.2)

## 2020-03-04 PROCEDURE — 99214 OFFICE O/P EST MOD 30 MIN: CPT | Performed by: PHYSICIAN ASSISTANT

## 2020-03-04 RX ORDER — LISINOPRIL AND HYDROCHLOROTHIAZIDE 12.5; 1 MG/1; MG/1
TABLET ORAL
Qty: 90 TABLET | Refills: 1 | Status: SHIPPED | OUTPATIENT
Start: 2020-03-04 | End: 2020-09-23 | Stop reason: SDUPTHER

## 2020-03-04 ASSESSMENT — PATIENT HEALTH QUESTIONNAIRE - PHQ9
1. LITTLE INTEREST OR PLEASURE IN DOING THINGS: 0
SUM OF ALL RESPONSES TO PHQ QUESTIONS 1-9: 0
SUM OF ALL RESPONSES TO PHQ9 QUESTIONS 1 & 2: 0
SUM OF ALL RESPONSES TO PHQ QUESTIONS 1-9: 0
2. FEELING DOWN, DEPRESSED OR HOPELESS: 0

## 2020-03-04 ASSESSMENT — ENCOUNTER SYMPTOMS
SHORTNESS OF BREATH: 0
CHEST TIGHTNESS: 0
WHEEZING: 0
COUGH: 0

## 2020-03-04 NOTE — PROGRESS NOTES
3/4/2020    Arleth Galindo (:  1965) is a 47 y.o. male, here for evaluation of the following medical concerns:    HPI     The patient is here to re-establish with this practice. Hypertension:  Home blood pressure monitoring: No.  He is adherent to a low sodium diet. Patient complains of chest pain. He had a stress test which was inconclusive. Following with cardiology. Denies any increase in chest discomfort since evaluation. Antihypertensive medication side effects: no medication side effects noted. Use of agents associated with hypertension: none. Sodium (mmol/L)   Date Value   2019 141    BUN (mg/dL)   Date Value   2019 19    Glucose (mg/dL)   Date Value   10/07/2019 104 (H)      Potassium (mmol/L)   Date Value   2019 4.8    CREATININE (mg/dL)   Date Value   2019 0.8 (L)         CMT- taking vitamin D    Pre-diabetes on lab work in 2019. Has made no change to diet. 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 Denies any unintentional weight loss, polydipsia, polyuria. Mother had diabetes when she passed but was not on any medication. Review of Systems   Constitutional: Negative for activity change, appetite change, fatigue and unexpected weight change. Eyes: Negative for visual disturbance. Respiratory: Negative for cough, chest tightness, shortness of breath and wheezing. Cardiovascular: Positive for chest pain. Negative for palpitations and leg swelling. Skin: Negative for pallor. Neurological: Negative for dizziness, light-headedness and headaches. Hematological: Does not bruise/bleed easily. Prior to Visit Medications    Medication Sig Taking?  Authorizing Provider   lisinopril-hydroCHLOROthiazide (PRINZIDE;ZESTORETIC) 10-12.5 MG per tablet TAKE ONE TABLET BY MOUTH DAILY Yes EAN Hopper   Multiple Vitamins-Minerals (THERAPEUTIC MULTIVITAMIN-MINERALS) tablet Take 1

## 2020-03-05 LAB
ESTIMATED AVERAGE GLUCOSE: 111.2 MG/DL
HBA1C MFR BLD: 5.5 %

## 2020-09-22 NOTE — TELEPHONE ENCOUNTER
Last office visit 3/4/2020     Last written 3-4-2020    Next office visit scheduled 11/6/2020    Requested Prescriptions     Pending Prescriptions Disp Refills    lisinopril-hydroCHLOROthiazide (PRINZIDE;ZESTORETIC) 10-12.5 MG per tablet 90 tablet 1     Sig: TAKE ONE TABLET BY MOUTH DAILY

## 2020-09-23 RX ORDER — LISINOPRIL AND HYDROCHLOROTHIAZIDE 12.5; 1 MG/1; MG/1
TABLET ORAL
Qty: 90 TABLET | Refills: 1 | Status: SHIPPED | OUTPATIENT
Start: 2020-09-23 | End: 2020-11-06

## 2020-11-06 ENCOUNTER — OFFICE VISIT (OUTPATIENT)
Dept: FAMILY MEDICINE CLINIC | Age: 55
End: 2020-11-06
Payer: COMMERCIAL

## 2020-11-06 VITALS
TEMPERATURE: 97.2 F | HEART RATE: 84 BPM | HEIGHT: 73 IN | OXYGEN SATURATION: 98 % | WEIGHT: 254 LBS | SYSTOLIC BLOOD PRESSURE: 138 MMHG | BODY MASS INDEX: 33.66 KG/M2 | DIASTOLIC BLOOD PRESSURE: 80 MMHG

## 2020-11-06 DIAGNOSIS — R53.83 FATIGUE, UNSPECIFIED TYPE: ICD-10-CM

## 2020-11-06 DIAGNOSIS — E78.2 MIXED HYPERLIPIDEMIA: ICD-10-CM

## 2020-11-06 DIAGNOSIS — I10 ESSENTIAL HYPERTENSION: ICD-10-CM

## 2020-11-06 DIAGNOSIS — Z12.5 PROSTATE CANCER SCREENING: ICD-10-CM

## 2020-11-06 DIAGNOSIS — R73.03 PRE-DIABETES: ICD-10-CM

## 2020-11-06 PROBLEM — R06.02 SOB (SHORTNESS OF BREATH): Status: RESOLVED | Noted: 2020-01-16 | Resolved: 2020-11-06

## 2020-11-06 PROBLEM — R07.2 PRECORDIAL PAIN: Status: RESOLVED | Noted: 2020-01-16 | Resolved: 2020-11-06

## 2020-11-06 LAB
A/G RATIO: 1.9 (ref 1.1–2.2)
ALBUMIN SERPL-MCNC: 4.5 G/DL (ref 3.4–5)
ALP BLD-CCNC: 52 U/L (ref 40–129)
ALT SERPL-CCNC: 39 U/L (ref 10–40)
ANION GAP SERPL CALCULATED.3IONS-SCNC: 10 MMOL/L (ref 3–16)
AST SERPL-CCNC: 26 U/L (ref 15–37)
BASOPHILS ABSOLUTE: 0.1 K/UL (ref 0–0.2)
BASOPHILS RELATIVE PERCENT: 0.9 %
BILIRUB SERPL-MCNC: 0.5 MG/DL (ref 0–1)
BUN BLDV-MCNC: 20 MG/DL (ref 7–20)
CALCIUM SERPL-MCNC: 9.9 MG/DL (ref 8.3–10.6)
CHLORIDE BLD-SCNC: 105 MMOL/L (ref 99–110)
CHOLESTEROL, TOTAL: 203 MG/DL (ref 0–199)
CO2: 26 MMOL/L (ref 21–32)
CREAT SERPL-MCNC: 0.8 MG/DL (ref 0.9–1.3)
EOSINOPHILS ABSOLUTE: 0.1 K/UL (ref 0–0.6)
EOSINOPHILS RELATIVE PERCENT: 1.4 %
GFR AFRICAN AMERICAN: >60
GFR NON-AFRICAN AMERICAN: >60
GLOBULIN: 2.4 G/DL
GLUCOSE BLD-MCNC: 104 MG/DL (ref 70–99)
HCT VFR BLD CALC: 43.2 % (ref 40.5–52.5)
HDLC SERPL-MCNC: 46 MG/DL (ref 40–60)
HEMOGLOBIN: 14.4 G/DL (ref 13.5–17.5)
LDL CHOLESTEROL CALCULATED: 139 MG/DL
LYMPHOCYTES ABSOLUTE: 2.2 K/UL (ref 1–5.1)
LYMPHOCYTES RELATIVE PERCENT: 35.5 %
MCH RBC QN AUTO: 31 PG (ref 26–34)
MCHC RBC AUTO-ENTMCNC: 33.4 G/DL (ref 31–36)
MCV RBC AUTO: 92.7 FL (ref 80–100)
MONOCYTES ABSOLUTE: 0.7 K/UL (ref 0–1.3)
MONOCYTES RELATIVE PERCENT: 10.6 %
NEUTROPHILS ABSOLUTE: 3.2 K/UL (ref 1.7–7.7)
NEUTROPHILS RELATIVE PERCENT: 51.6 %
PDW BLD-RTO: 13.4 % (ref 12.4–15.4)
PLATELET # BLD: 234 K/UL (ref 135–450)
PMV BLD AUTO: 8.3 FL (ref 5–10.5)
POTASSIUM SERPL-SCNC: 4.9 MMOL/L (ref 3.5–5.1)
PROSTATE SPECIFIC ANTIGEN: 1.24 NG/ML (ref 0–4)
RBC # BLD: 4.65 M/UL (ref 4.2–5.9)
SODIUM BLD-SCNC: 141 MMOL/L (ref 136–145)
TOTAL PROTEIN: 6.9 G/DL (ref 6.4–8.2)
TRIGL SERPL-MCNC: 92 MG/DL (ref 0–150)
VITAMIN D 25-HYDROXY: 47 NG/ML
VLDLC SERPL CALC-MCNC: 18 MG/DL
WBC # BLD: 6.2 K/UL (ref 4–11)

## 2020-11-06 PROCEDURE — 99396 PREV VISIT EST AGE 40-64: CPT | Performed by: PHYSICIAN ASSISTANT

## 2020-11-06 RX ORDER — LISINOPRIL AND HYDROCHLOROTHIAZIDE 20; 12.5 MG/1; MG/1
1 TABLET ORAL DAILY
Qty: 90 TABLET | Refills: 1 | Status: SHIPPED | OUTPATIENT
Start: 2020-11-06 | End: 2021-06-01

## 2020-11-06 ASSESSMENT — ENCOUNTER SYMPTOMS
VOMITING: 0
COLOR CHANGE: 0
TROUBLE SWALLOWING: 0
DIARRHEA: 0
BLOOD IN STOOL: 0
CHEST TIGHTNESS: 0
CONSTIPATION: 0
SHORTNESS OF BREATH: 0

## 2020-11-06 NOTE — PROGRESS NOTES
2020    Shiloh Buitrago (:  1965) is a 47 y.o. male, here for a preventive medicine evaluation. Patient Active Problem List   Diagnosis    Essential hypertension    CMT (Charcot-Liliana-Tooth disease)    Mixed hyperlipidemia    Precordial pain    SOB (shortness of breath)     The pt is here to re-establish care. He is a former pt of [facility]:  No concerns  Vision: every other year  Exercise: work is physically demanding  Diet: Standard American Diet  Works in construction  Fatigue:  Pt has been experiencing fatigue over the last few months. He is concerned about his testosterone. He denies muscle weakness, erectile dysfunction, swelling in her lower legs, shortness of breath or chest pain. Hx of prediabetes last year    HTN:  Does not check BP at home. Does not follow a low sodium diet. Has not missed any doses of medication. Denies: chest pain, shortness of breath or headaches. No side effects to medication. Review of Systems   Constitutional: Positive for fatigue. Negative for diaphoresis and unexpected weight change. HENT: Negative for trouble swallowing. Eyes: Negative for visual disturbance. Respiratory: Negative for chest tightness and shortness of breath. Cardiovascular: Negative for chest pain, palpitations and leg swelling. Gastrointestinal: Negative for blood in stool, constipation, diarrhea and vomiting. Endocrine: Negative for polydipsia, polyphagia and polyuria. Genitourinary: Negative for difficulty urinating. Skin: Negative for color change. Neurological: Negative for dizziness, weakness, light-headedness and headaches. Hematological: Negative for adenopathy. Prior to Visit Medications    Medication Sig Taking?  Authorizing Provider   lisinopril-hydroCHLOROthiazide (PRINZIDE;ZESTORETIC) 20-12.5 MG per tablet Take 1 tablet by mouth daily Yes EAN Painting   Multiple Vitamins-Minerals (THERAPEUTIC MULTIVITAMIN-MINERALS) tablet Take 1 tablet by mouth daily Yes Historical Provider, MD   vitamin D (CHOLECALCIFEROL) 1000 UNIT TABS tablet Take 1,000 Units by mouth daily Yes Historical Provider, MD        No Known Allergies    Past Medical History:   Diagnosis Date    Charcot-Liliana-Tooth disease     Hyperlipemia     Hypertension     Mixed hyperlipidemia 1/15/2020       Past Surgical History:   Procedure Laterality Date    BREAST BIOPSY      COLONOSCOPY      repeat in 2022    VASECTOMY         Social History     Socioeconomic History    Marital status:      Spouse name: Not on file    Number of children: 2    Years of education: HS    Highest education level: Not on file   Occupational History    Not on file   Social Needs    Financial resource strain: Not on file    Food insecurity     Worry: Not on file     Inability: Not on file   [employer] needs     Medical: Not on file     Non-medical: Not on file   Tobacco Use    Smoking status: Never Smoker    Smokeless tobacco: Never Used   Substance and Sexual Activity    Alcohol use:  Yes    Drug use: No    Sexual activity: Yes   Lifestyle    Physical activity     Days per week: Not on file     Minutes per session: Not on file    Stress: Not on file   Relationships    Social connections     Talks on phone: Not on file     Gets together: Not on file     Attends Congregation service: Not on file     Active member of club or organization: Not on file     Attends meetings of clubs or organizations: Not on file     Relationship status: Not on file    Intimate partner violence     Fear of current or ex partner: Not on file     Emotionally abused: Not on file     Physically abused: Not on file     Forced sexual activity: Not on file   Other Topics Concern    Not on file   Social History Narrative    Not on file        Family History   Problem Relation Age of Onset    Cancer Father         small cell carcinoma     Heart Disease Father         [facility] Other Mother PAD, smoker, CMT disease    Diabetes Mother        ADVANCE DIRECTIVE: N, <no information>    Vitals:    11/06/20 0752 11/06/20 0815   BP: 138/84 138/80   Pulse: 84    Temp: 97.2 °F (36.2 °C)    TempSrc: Temporal    SpO2: 98%    Weight: 254 lb (115.2 kg)    Height: 6' 1\" (1.854 m)      Estimated body mass index is 33.51 kg/m² as calculated from the following:    Height as of this encounter: 6' 1\" (1.854 m). Weight as of this encounter: 254 lb (115.2 kg). Physical Exam  Vitals signs reviewed. Constitutional:       General: He is not in acute distress. Appearance: Normal appearance. HENT:      Head: Normocephalic and atraumatic. Eyes:      Extraocular Movements: Extraocular movements intact. Conjunctiva/sclera: Conjunctivae normal.      Pupils: Pupils are equal, round, and reactive to light. Cardiovascular:      Rate and Rhythm: Normal rate and regular rhythm. Comments: Very faint systolic murmur  Pulmonary:      Effort: Pulmonary effort is normal.      Breath sounds: Normal breath sounds. Abdominal:      General: Bowel sounds are normal. There is no distension. Palpations: Abdomen is soft. There is no mass. Tenderness: There is no abdominal tenderness. Musculoskeletal:      Right lower leg: No edema. Left lower leg: No edema. Lymphadenopathy:      Cervical: No cervical adenopathy. Skin:     Findings: No erythema. Neurological:      Mental Status: He is alert and oriented to person, place, and time. Cranial Nerves: No cranial nerve deficit. Psychiatric:         Mood and Affect: Mood normal.         Behavior: Behavior normal.         Thought Content: Thought content normal.         Judgment: Judgment normal.         No flowsheet data found.     Lab Results   Component Value Date    CHOL 195 10/07/2019    CHOL 193 09/18/2018    TRIG 294 10/07/2019    TRIG 179 09/18/2018    HDL 41 10/07/2019    HDL 48 09/18/2018    LDLCALC 95 10/07/2019    LDLCALC 109 09/18/2018 note.    --EAN Lomas on 11/6/2020 at 8:58 AM

## 2020-11-07 LAB
ESTIMATED AVERAGE GLUCOSE: 114 MG/DL
HBA1C MFR BLD: 5.6 %

## 2020-11-10 LAB
SEX HORMONE BINDING GLOBULIN: 32 NMOL/L (ref 11–80)
TESTOSTERONE FREE-NONMALE: 40.8 PG/ML (ref 47–244)
TESTOSTERONE TOTAL: 210 NG/DL (ref 220–1000)

## 2020-12-29 ENCOUNTER — VIRTUAL VISIT (OUTPATIENT)
Dept: ENDOCRINOLOGY | Age: 55
End: 2020-12-29
Payer: COMMERCIAL

## 2020-12-29 PROCEDURE — 99215 OFFICE O/P EST HI 40 MIN: CPT | Performed by: INTERNAL MEDICINE

## 2020-12-29 NOTE — PROGRESS NOTES
Pursuant to the emergency declaration under the [facility] waiver authority and the [employer] Act, this Virtual  Visit was conducted, with patient's consent, to reduce the patient's risk of exposure to COVID-19 and provide continuity of care for an established patient. Patient was at home. Provider was at home. No one else was involved  Services were provided through a video synchronous discussion virtually to substitute for in-person clinic visit. 56Y/o WM seen for evaluation of low testosterone    He had fatigue, low libido  No ED    Initial workup showed a total testosterone level of  210 Ng/dl on 11/20   . He denies any change in shaving frequency, no change in testicular or penile size. No gynecomastia, denies any breast discharge. There is no previous h/o mumps, no h/u use of narcotics or anabolic steroids. He is never been on any testosterone replacement    Children- 2    Mild, uncontrolled, no worsening factors    No h/o chemotherapy, radiation therapy, excessive alcohol. No h/o anosmia    He has a h/o thyroid nodule, has seen Dr. Jordy Fletcher    Patient had Thyroid US done in 04/30/19 at [facility] which showed nodules in both lobes.      In right lobe , dominant nodule measures 2.2 cm x 1.6 cm   7 mm nodule also seen in the R lobe.       In left lobe , dominant nodule measures 1.3 cm    Mixed solid cystic, no f/u USG    Past Medical History:   Diagnosis Date    Charcot-Liliana-Tooth disease     Hyperlipemia     Hypertension     Mixed hyperlipidemia 1/15/2020     Past Surgical History:   Procedure Laterality Date    BREAST BIOPSY      COLONOSCOPY      repeat in 2022    VASECTOMY       Current Outpatient Medications   Medication Sig Dispense Refill    lisinopril-hydroCHLOROthiazide (PRINZIDE;ZESTORETIC) 20-12.5 MG per tablet Take 1 tablet by mouth daily 90 tablet 1    Multiple Vitamins-Minerals (THERAPEUTIC MULTIVITAMIN-MINERALS) tablet Take 1 tablet by mouth daily      vitamin D (CHOLECALCIFEROL) 1000 UNIT TABS tablet Take 1,000 Units by mouth daily       No current facility-administered medications for this visit. Review of Systems  Constitutional: Negative for weight loss and malaise/fatigue. Negative for fever and chills. HENT: Negative for hearing loss, ear pain, nosebleeds, neck pain and tinnitus. Eyes: Negative for blurred vision. Negative for double vision, photophobia and pain. Respiratory: Negative for cough and sputum production. Cardiovascular: Negative for chest pain, palpitations and leg swelling. Gastrointestinal: Negative for nausea, vomiting and abdominal pain. Genitourinary: Negative for dysuria, urgency and frequency. Musculoskeletal: Negative for back pain. No joint pain  Skin: Negative for itching and rash. Neurological: Negative for dizziness. Negative for tingling, tremors, focal weakness and headaches. Endo/Heme/Allergies: see HPI  Psychiatric/Behavioral: Negative for depression and substance abuse. PHYSICAL EXAMINATION:  [ INSTRUCTIONS:  \"[x]\" Indicates a positive item  \"[]\" Indicates a negative item  -- DELETE ALL ITEMS NOT EXAMINED]  Vital Signs: (As obtained by patient/caregiver or practitioner observation)    Blood pressure-  Heart rate-    Respiratory rate- 14   Temperature-  Pulse oximetry-     Constitutional Appears well-developed and well-nourished No apparent distress        Mental status  Alert and awake  Oriented to person/place/time  Able to follow commands      Eyes:  EOM    [x]  Normal    Sclera  [x]  Normal           Discharge [x]  None visible      HENT:   [x] Normocephalic, atraumatic.     [x] Mouth/Throat: Mucous membranes are moist.     External Ears [x] Normal  no discharge    Neck: [x] No visualized mass  no swelling    Pulmonary/Chest: [x] Respiratory effort normal.  [x] No visualized signs of difficulty breathing or respiratory distress             Musculoskeletal:   [x] Normal gait with no signs of ataxia         [x] Normal range of motion of neck          Head and neck stable, appears normal ROM, strength good    Neurological:        [x] No Facial Asymmetry (Cranial nerve 7 motor function) (limited exam to video visit)          [x] No gaze palsy                Skin:        [x] No significant exanthematous lesions or discoloration noted on facial skin                 Psychiatric:       [x] Normal Affect [x] No Hallucinations            Other pertinent observable physical exam findings-     Due to this being a TeleHealth encounter, evaluation of the following organ systems is limited: Vitals/Constitutional/EENT/Resp/CV/GI//MS/Neuro/Skin/Heme-Lymph-Imm. Services were provided through a video synchronous discussion virtually to substitute for in-person clinic visit. Lab Reviewed       Assessment: 1. Low Testosterone: Level slightly low. Risk factor is obesity, age. Will check other hormones. Discussed replacement, side effects. He would like to work on life style changes/weight loss and replace only if needed. Will decide based on lab results  2. Thyroid Nodule: Right 2.2cm and left 1.3cm in 5/19. Recommend needs f/u. No h/o FNA. Discussed the importance of f/u     Plan: 1. Repeat testosterone, LH, FSH and prolactin level  2. Replacement depending on results  3. Thyroid USG

## 2021-04-05 DIAGNOSIS — R79.89 LOW TESTOSTERONE: ICD-10-CM

## 2021-04-05 DIAGNOSIS — E04.1 THYROID NODULE: ICD-10-CM

## 2021-04-05 LAB
FOLLICLE STIMULATING HORMONE: 4.7 MIU/ML
LUTEINIZING HORMONE: 3.1 MIU/ML
PROLACTIN: 4.6 NG/ML

## 2021-04-07 LAB
SEX HORMONE BINDING GLOBULIN: 32 NMOL/L (ref 11–80)
TESTOSTERONE FREE-NONMALE: 31.2 PG/ML (ref 47–244)
TESTOSTERONE TOTAL: 163 NG/DL (ref 220–1000)

## 2021-05-29 DIAGNOSIS — I10 ESSENTIAL HYPERTENSION: ICD-10-CM

## 2021-06-01 RX ORDER — LISINOPRIL AND HYDROCHLOROTHIAZIDE 20; 12.5 MG/1; MG/1
TABLET ORAL
Qty: 90 TABLET | Refills: 0 | Status: SHIPPED | OUTPATIENT
Start: 2021-06-01 | End: 2021-07-15 | Stop reason: SDUPTHER

## 2021-06-01 NOTE — TELEPHONE ENCOUNTER
.  Last office visit 11/6/2020     Last written 11-6-2020 90 with 1      Next office visit scheduled Visit date not found    Requested Prescriptions     Pending Prescriptions Disp Refills    lisinopril-hydroCHLOROthiazide (PRINZIDE;ZESTORETIC) 20-12.5 MG per tablet [Pharmacy Med Name: LISINOPRIL-HCTZ 20-12.5 MG TAB] 90 tablet 0     Sig: TAKE ONE TABLET BY MOUTH DAILY

## 2021-07-15 ENCOUNTER — OFFICE VISIT (OUTPATIENT)
Dept: FAMILY MEDICINE CLINIC | Age: 56
End: 2021-07-15
Payer: COMMERCIAL

## 2021-07-15 VITALS
SYSTOLIC BLOOD PRESSURE: 130 MMHG | BODY MASS INDEX: 31.66 KG/M2 | WEIGHT: 240 LBS | OXYGEN SATURATION: 98 % | DIASTOLIC BLOOD PRESSURE: 80 MMHG | HEART RATE: 74 BPM

## 2021-07-15 DIAGNOSIS — E04.1 THYROID NODULE: ICD-10-CM

## 2021-07-15 DIAGNOSIS — E78.2 MIXED HYPERLIPIDEMIA: ICD-10-CM

## 2021-07-15 DIAGNOSIS — I10 ESSENTIAL HYPERTENSION: Primary | ICD-10-CM

## 2021-07-15 DIAGNOSIS — I10 ESSENTIAL HYPERTENSION: ICD-10-CM

## 2021-07-15 DIAGNOSIS — G60.0 CMT (CHARCOT-MARIE-TOOTH DISEASE): ICD-10-CM

## 2021-07-15 LAB
A/G RATIO: 1.9 (ref 1.1–2.2)
ALBUMIN SERPL-MCNC: 4.6 G/DL (ref 3.4–5)
ALP BLD-CCNC: 59 U/L (ref 40–129)
ALT SERPL-CCNC: 30 U/L (ref 10–40)
ANION GAP SERPL CALCULATED.3IONS-SCNC: 12 MMOL/L (ref 3–16)
AST SERPL-CCNC: 24 U/L (ref 15–37)
BASOPHILS ABSOLUTE: 0.1 K/UL (ref 0–0.2)
BASOPHILS RELATIVE PERCENT: 0.8 %
BILIRUB SERPL-MCNC: 0.5 MG/DL (ref 0–1)
BUN BLDV-MCNC: 17 MG/DL (ref 7–20)
CALCIUM SERPL-MCNC: 9.4 MG/DL (ref 8.3–10.6)
CHLORIDE BLD-SCNC: 101 MMOL/L (ref 99–110)
CHOLESTEROL, TOTAL: 201 MG/DL (ref 0–199)
CO2: 26 MMOL/L (ref 21–32)
CREAT SERPL-MCNC: 0.8 MG/DL (ref 0.9–1.3)
EOSINOPHILS ABSOLUTE: 0.1 K/UL (ref 0–0.6)
EOSINOPHILS RELATIVE PERCENT: 0.8 %
GFR AFRICAN AMERICAN: >60
GFR NON-AFRICAN AMERICAN: >60
GLOBULIN: 2.4 G/DL
GLUCOSE BLD-MCNC: 104 MG/DL (ref 70–99)
HCT VFR BLD CALC: 43 % (ref 40.5–52.5)
HDLC SERPL-MCNC: 57 MG/DL (ref 40–60)
HEMOGLOBIN: 14.3 G/DL (ref 13.5–17.5)
LDL CHOLESTEROL CALCULATED: 126 MG/DL
LYMPHOCYTES ABSOLUTE: 2.4 K/UL (ref 1–5.1)
LYMPHOCYTES RELATIVE PERCENT: 29.7 %
MCH RBC QN AUTO: 30.8 PG (ref 26–34)
MCHC RBC AUTO-ENTMCNC: 33.2 G/DL (ref 31–36)
MCV RBC AUTO: 92.8 FL (ref 80–100)
MONOCYTES ABSOLUTE: 0.7 K/UL (ref 0–1.3)
MONOCYTES RELATIVE PERCENT: 8.3 %
NEUTROPHILS ABSOLUTE: 4.8 K/UL (ref 1.7–7.7)
NEUTROPHILS RELATIVE PERCENT: 60.4 %
PDW BLD-RTO: 13.6 % (ref 12.4–15.4)
PLATELET # BLD: 257 K/UL (ref 135–450)
PMV BLD AUTO: 8.4 FL (ref 5–10.5)
POTASSIUM SERPL-SCNC: 4.5 MMOL/L (ref 3.5–5.1)
RBC # BLD: 4.64 M/UL (ref 4.2–5.9)
SODIUM BLD-SCNC: 139 MMOL/L (ref 136–145)
T4 FREE: 1.1 NG/DL (ref 0.9–1.8)
TOTAL PROTEIN: 7 G/DL (ref 6.4–8.2)
TRIGL SERPL-MCNC: 88 MG/DL (ref 0–150)
TSH REFLEX: 1.12 UIU/ML (ref 0.27–4.2)
VLDLC SERPL CALC-MCNC: 18 MG/DL
WBC # BLD: 8 K/UL (ref 4–11)

## 2021-07-15 PROCEDURE — 99214 OFFICE O/P EST MOD 30 MIN: CPT | Performed by: PHYSICIAN ASSISTANT

## 2021-07-15 RX ORDER — LISINOPRIL AND HYDROCHLOROTHIAZIDE 20; 12.5 MG/1; MG/1
TABLET ORAL
Qty: 90 TABLET | Refills: 1 | Status: SHIPPED
Start: 2021-07-15 | End: 2022-02-21 | Stop reason: DRUGHIGH

## 2021-07-15 SDOH — ECONOMIC STABILITY: FOOD INSECURITY: WITHIN THE PAST 12 MONTHS, YOU WORRIED THAT YOUR FOOD WOULD RUN OUT BEFORE YOU GOT MONEY TO BUY MORE.: NEVER TRUE

## 2021-07-15 SDOH — ECONOMIC STABILITY: FOOD INSECURITY: WITHIN THE PAST 12 MONTHS, THE FOOD YOU BOUGHT JUST DIDN'T LAST AND YOU DIDN'T HAVE MONEY TO GET MORE.: NEVER TRUE

## 2021-07-15 ASSESSMENT — ENCOUNTER SYMPTOMS
TROUBLE SWALLOWING: 0
WHEEZING: 0
SHORTNESS OF BREATH: 0
CHEST TIGHTNESS: 0

## 2021-07-15 ASSESSMENT — PATIENT HEALTH QUESTIONNAIRE - PHQ9
SUM OF ALL RESPONSES TO PHQ9 QUESTIONS 1 & 2: 0
SUM OF ALL RESPONSES TO PHQ QUESTIONS 1-9: 0
2. FEELING DOWN, DEPRESSED OR HOPELESS: 0
1. LITTLE INTEREST OR PLEASURE IN DOING THINGS: 0

## 2021-07-15 ASSESSMENT — SOCIAL DETERMINANTS OF HEALTH (SDOH): HOW HARD IS IT FOR YOU TO PAY FOR THE VERY BASICS LIKE FOOD, HOUSING, MEDICAL CARE, AND HEATING?: NOT HARD AT ALL

## 2021-07-15 NOTE — PROGRESS NOTES
7/15/2021  Alicia Harvey (: 1965)  54 y.o.      HPI  HTN:  Current medication: lisinopril-hctz 20-12.5 mg  Side effects: none  Denies: chest pain, shortness of breath, headache, lower extremity edema  Pt does not check his blood pressure at home  He does not follow a low sodium diet    CMT:  Has bilateral neuropathy, with burning and numbness  Getting stem cell therapy in his legs, does light therapy, TENS unit at an office in [place] which has increased his sensation to cold and feeling in his ankles    HLD:  Pt has not made many changes to his diet since our last appt. Other than eating smaller portions. He admits to eating out frequently due to convenience. He does not do any exercise outside of work. He has lost 14 lbs in the last month. The 10-year ASCVD risk score (Alonzo Olson, et al., 2013) is: 7.3%    Values used to calculate the score:      Age: 54 years      Sex: Male      Is Non- : No      Diabetic: No      Tobacco smoker: No      Systolic Blood Pressure: 806 mmHg      Is BP treated: Yes      HDL Cholesterol: 46 mg/dL      Total Cholesterol: 203 mg/dL    Thyroid nodule:  Last labs were in 2019  Was following up with Dr. Nils Benitez but has not completed his echo of thyroid  No hx of FNA    Review of Systems   Constitutional: Negative for appetite change, fatigue and unexpected weight change. HENT: Negative for trouble swallowing. Eyes: Negative for visual disturbance. Respiratory: Negative for chest tightness, shortness of breath and wheezing. Cardiovascular: Negative for chest pain, palpitations and leg swelling. Endocrine: Negative for cold intolerance and heat intolerance. Neurological: Positive for numbness. Negative for weakness, light-headedness and headaches. Hematological: Negative for adenopathy. Allergies, past medical history, family history, and social history reviewed and unchanged from previous encounter.      Current Outpatient Medications Medication Sig Dispense Refill    lisinopril-hydroCHLOROthiazide (PRINZIDE;ZESTORETIC) 20-12.5 MG per tablet TAKE ONE TABLET BY MOUTH DAILY 90 tablet 1    Multiple Vitamins-Minerals (THERAPEUTIC MULTIVITAMIN-MINERALS) tablet Take 1 tablet by mouth daily      vitamin D (CHOLECALCIFEROL) 1000 UNIT TABS tablet Take 1,000 Units by mouth daily       No current facility-administered medications for this visit. Vitals:    07/15/21 0754   BP: 130/80   Pulse: 74   SpO2: 98%   Weight: 240 lb (108.9 kg)     Estimated body mass index is 31.66 kg/m² as calculated from the following:    Height as of 11/6/20: 6' 1\" (1.854 m). Weight as of this encounter: 240 lb (108.9 kg). Physical Exam  Vitals reviewed. Constitutional:       Appearance: Normal appearance. HENT:      Head: Normocephalic and atraumatic. Eyes:      Conjunctiva/sclera: Conjunctivae normal.   Cardiovascular:      Rate and Rhythm: Normal rate and regular rhythm. Heart sounds: Normal heart sounds. Pulmonary:      Effort: Pulmonary effort is normal.      Breath sounds: Normal breath sounds. Musculoskeletal:      Right lower leg: No edema. Left lower leg: No edema. Neurological:      Mental Status: He is alert and oriented to person, place, and time. Cranial Nerves: No cranial nerve deficit. ASSESSMENT and PLAN:  Leonardo Tyson was seen today for hypertension. Diagnoses and all orders for this visit:    Essential hypertension  -     COMPREHENSIVE METABOLIC PANEL; Future  -     CBC Auto Differential; Future  -     lisinopril-hydroCHLOROthiazide (PRINZIDE;ZESTORETIC) 20-12.5 MG per tablet; TAKE ONE TABLET BY MOUTH DAILY    CMT (Charcot-Liliana-Tooth disease)        -     Continue treatment with office in [facility] hyperlipidemia  -     LIPID PANEL; Future        -     Discussed the role of statin medication and his current risk score. If risk score is static or worsened he is agreeable to medication at this time.  I encouraged him to look into healthier diets such as the mediterranean diet and to cut back on processed foods and fast foods. Thyroid nodule  -     TSH with Reflex; Future  -     T4, FREE; Future        -      Phone number provided for US of thyroid nodule. Encouraged to follow up with Dr. Magali Beavers for this and testosterone deficiency. Will review labs when they return. Return in about 6 months (around 1/15/2022) for HTN.

## 2021-08-05 ENCOUNTER — HOSPITAL ENCOUNTER (OUTPATIENT)
Dept: ULTRASOUND IMAGING | Age: 56
Discharge: HOME | End: 2021-08-05
Payer: COMMERCIAL

## 2021-08-05 DIAGNOSIS — E04.1 THYROID NODULE: ICD-10-CM

## 2021-08-05 DIAGNOSIS — R79.89 LOW TESTOSTERONE: ICD-10-CM

## 2021-08-05 PROCEDURE — 76536 US EXAM OF HEAD AND NECK: CPT

## 2021-08-10 ENCOUNTER — TELEPHONE (OUTPATIENT)
Dept: INTERVENTIONAL RADIOLOGY/VASCULAR | Age: 56
End: 2021-08-10

## 2021-08-10 NOTE — TELEPHONE ENCOUNTER
Spoke with pt to schedule thyroid biopsy, Pt scheduled for Monday 8/16/21 for 0830 with 0800 arrival.    Per [facility] with [facility], Dr. Sylvia Devi recommended FNA per the ultrasound report.

## 2021-08-16 ENCOUNTER — HOSPITAL ENCOUNTER (OUTPATIENT)
Dept: ULTRASOUND IMAGING | Age: 56
Discharge: HOME | End: 2021-08-16
Payer: COMMERCIAL

## 2021-08-16 DIAGNOSIS — E04.1 THYROID NODULE: ICD-10-CM

## 2021-08-16 PROCEDURE — 88173 CYTOPATH EVAL FNA REPORT: CPT

## 2021-08-16 PROCEDURE — 60100 BIOPSY OF THYROID: CPT

## 2021-08-16 PROCEDURE — 88305 TISSUE EXAM BY PATHOLOGIST: CPT

## 2021-08-16 NOTE — PROGRESS NOTES
RADIOLOGY:  Patient status post ultrasound guided FNA of right thyroid lobe nodule. Patient tolerated the procedure well. Full report to follow.

## 2022-02-21 ENCOUNTER — OFFICE VISIT (OUTPATIENT)
Dept: FAMILY MEDICINE CLINIC | Age: 57
End: 2022-02-21
Payer: COMMERCIAL

## 2022-02-21 VITALS
DIASTOLIC BLOOD PRESSURE: 80 MMHG | SYSTOLIC BLOOD PRESSURE: 138 MMHG | HEART RATE: 83 BPM | OXYGEN SATURATION: 99 % | WEIGHT: 255 LBS | BODY MASS INDEX: 32.73 KG/M2 | HEIGHT: 74 IN

## 2022-02-21 DIAGNOSIS — Z23 NEED FOR TDAP VACCINATION: ICD-10-CM

## 2022-02-21 DIAGNOSIS — I10 ESSENTIAL HYPERTENSION: ICD-10-CM

## 2022-02-21 DIAGNOSIS — E78.2 MIXED HYPERLIPIDEMIA: ICD-10-CM

## 2022-02-21 DIAGNOSIS — I10 ESSENTIAL HYPERTENSION: Primary | ICD-10-CM

## 2022-02-21 LAB
ALBUMIN SERPL-MCNC: 4.7 G/DL (ref 3.4–5)
ANION GAP SERPL CALCULATED.3IONS-SCNC: 19 MMOL/L (ref 3–16)
BUN BLDV-MCNC: 13 MG/DL (ref 7–20)
CALCIUM SERPL-MCNC: 9.7 MG/DL (ref 8.3–10.6)
CHLORIDE BLD-SCNC: 101 MMOL/L (ref 99–110)
CHOLESTEROL, TOTAL: 211 MG/DL (ref 0–199)
CO2: 22 MMOL/L (ref 21–32)
CREAT SERPL-MCNC: 0.9 MG/DL (ref 0.9–1.3)
GFR AFRICAN AMERICAN: >60
GFR NON-AFRICAN AMERICAN: >60
GLUCOSE BLD-MCNC: 110 MG/DL (ref 70–99)
HDLC SERPL-MCNC: 51 MG/DL (ref 40–60)
LDL CHOLESTEROL CALCULATED: 121 MG/DL
PHOSPHORUS: 2.9 MG/DL (ref 2.5–4.9)
POTASSIUM SERPL-SCNC: 4.7 MMOL/L (ref 3.5–5.1)
SODIUM BLD-SCNC: 142 MMOL/L (ref 136–145)
TRIGL SERPL-MCNC: 194 MG/DL (ref 0–150)
VLDLC SERPL CALC-MCNC: 39 MG/DL

## 2022-02-21 PROCEDURE — 99214 OFFICE O/P EST MOD 30 MIN: CPT | Performed by: PHYSICIAN ASSISTANT

## 2022-02-21 PROCEDURE — 90471 IMMUNIZATION ADMIN: CPT | Performed by: PHYSICIAN ASSISTANT

## 2022-02-21 PROCEDURE — 90715 TDAP VACCINE 7 YRS/> IM: CPT | Performed by: PHYSICIAN ASSISTANT

## 2022-02-21 RX ORDER — LISINOPRIL AND HYDROCHLOROTHIAZIDE 25; 20 MG/1; MG/1
1 TABLET ORAL DAILY
Qty: 90 TABLET | Refills: 1 | Status: SHIPPED | OUTPATIENT
Start: 2022-02-21 | End: 2022-08-23 | Stop reason: SDUPTHER

## 2022-02-21 ASSESSMENT — ENCOUNTER SYMPTOMS
SHORTNESS OF BREATH: 0
CHEST TIGHTNESS: 0
BLOOD IN STOOL: 0

## 2022-02-21 NOTE — PROGRESS NOTES
Thanh Tao (:  1965) is a 64 y.o. male,Established patient, here for evaluation of the following chief complaint(s):  Hypertension (follow up, fasting blood work )         ASSESSMENT/PLAN:  1. Essential hypertension  -     Renal Function Panel; Future  -     lisinopril-hydroCHLOROthiazide (PRINZIDE;ZESTORETIC) 20-25 MG per tablet; Take 1 tablet by mouth daily, Disp-90 tablet, R-1Normal  -     subopitmal blood pressure reading, increase HCTZ  -     Follow up in 6 months  2. Mixed hyperlipidemia  -     LIPID PANEL; Future  -     Discussed medication risks, benefits and side effects with the patient. Follow up after lab work is completed  3. Need for Tdap vaccination  -     Tdap (age 6y and older) IM ([employer] [facility])      Return in about 6 months (around 2022) for HTN. Subjective   SUBJECTIVE/OBJECTIVE:  HPI  HTN:  Current medication: prinzide  Side effects: none  Home blood pressure readings: does not check at home  Denies any chest pain, shortness of breath, swelling in lower extremities    HLD:  Diet controlled  Diet: eating a lot of fast food,not much water  Exercise: physically active at work  The 10-year ASCVD risk score (Jaden Ortiz., et al., 2013) is: 7.3%    Values used to calculate the score:      Age: 64 years      Sex: Male      Is Non- : No      Diabetic: No      Tobacco smoker: No      Systolic Blood Pressure: 807 mmHg      Is BP treated: Yes      HDL Cholesterol: 57 mg/dL      Total Cholesterol: 201 mg/dL    Due for Tdap    Review of Systems   Constitutional: Negative for fatigue and unexpected weight change. Eyes: Negative for visual disturbance. Respiratory: Negative for chest tightness and shortness of breath. Cardiovascular: Negative for chest pain, palpitations and leg swelling. Gastrointestinal: Negative for blood in stool. Genitourinary: Negative for hematuria. Neurological: Negative for dizziness and headaches.           Objective Physical Exam  Vitals reviewed. Constitutional:       Appearance: Normal appearance. HENT:      Head: Normocephalic and atraumatic. Eyes:      Conjunctiva/sclera: Conjunctivae normal.      Pupils: Pupils are equal, round, and reactive to light. Cardiovascular:      Rate and Rhythm: Normal rate and regular rhythm. Heart sounds: Normal heart sounds. Pulmonary:      Effort: Pulmonary effort is normal.      Breath sounds: Normal breath sounds. Neurological:      Mental Status: He is alert and oriented to person, place, and time. Cranial Nerves: No cranial nerve deficit. An electronic signature was used to authenticate this note.     --EAN Aldana

## 2022-02-22 RX ORDER — ATORVASTATIN CALCIUM 20 MG/1
20 TABLET, FILM COATED ORAL DAILY
Qty: 90 TABLET | Refills: 1 | Status: SHIPPED | OUTPATIENT
Start: 2022-02-22 | End: 2022-08-23 | Stop reason: SDUPTHER

## 2022-03-15 DIAGNOSIS — I10 ESSENTIAL HYPERTENSION: ICD-10-CM

## 2022-03-15 RX ORDER — LISINOPRIL AND HYDROCHLOROTHIAZIDE 20; 12.5 MG/1; MG/1
TABLET ORAL
Qty: 90 TABLET | Refills: 1 | OUTPATIENT
Start: 2022-03-15

## 2022-03-15 NOTE — TELEPHONE ENCOUNTER
Refill Request     Last Seen: Last Seen Department: 2/21/2022  Last Seen by PCP: 2/21/2022    Last Written: 02/21/2022 90 Tablet 1 Refill    Next Appointment:   No future appointments. Message to [facility] to schedule appointment. Return in about 6 months (around 8/21/2022) for HTN.           Requested Prescriptions     Pending Prescriptions Disp Refills    lisinopril-hydroCHLOROthiazide (PRINZIDE;ZESTORETIC) 20-12.5 MG per tablet [Pharmacy Med Name: LISINOPRIL-HCTZ 20-12.5 MG TAB] 90 tablet 1     Sig: TAKE ONE TABLET BY MOUTH DAILY

## 2022-03-24 NOTE — TELEPHONE ENCOUNTER
Left third message to schedule appointment in August, mailed letter, sent note in [facility]

## 2022-08-22 NOTE — PROGRESS NOTES
2022    Montse Jacobsen (:  1965) is a 64 y.o. male, here for a preventive medicine evaluation. Patient Active Problem List   Diagnosis    Essential hypertension    CMT (Charcot-Liliana-Tooth disease)    Mixed hyperlipidemia     Diet: Standard american Diet  Exercise: mostly at work, walking and walking  Dental: no concerns  Vision: wears glasses, no eye disease  Blood pressure readings at home: does not check at home  Feels tired in general  Wakes up during the night a lot due to neuropathy  Hands fall asleep for the last 6 months- denies any neck pain   Snores loudly, moves a lot in his sleep, no observed apneic episodes  Hx of thyroid nodule: 3.1 cm, has been 2 years since it was biopsied, found to be benign. Was seeing endo but they moved and has not had follow up    Review of Systems   Constitutional:  Positive for fatigue. Negative for activity change, appetite change, chills, diaphoresis and unexpected weight change. Eyes:  Negative for visual disturbance. Respiratory:  Negative for apnea, cough, shortness of breath and wheezing. Snoring   Cardiovascular:  Negative for palpitations and leg swelling. History of chest discomfort in 2019, never followed up with CT calcium score   Gastrointestinal:  Negative for abdominal pain, blood in stool, constipation and diarrhea. Endocrine: Negative for polydipsia, polyphagia and polyuria. Genitourinary:  Negative for difficulty urinating and hematuria. Musculoskeletal:  Positive for arthralgias. Negative for neck pain. Skin:  Negative for color change and pallor. Neurological:  Positive for numbness. Negative for dizziness, weakness, light-headedness and headaches. Hematological:  Negative for adenopathy. Prior to Visit Medications    Medication Sig Taking?  Authorizing Provider   atorvastatin (LIPITOR) 20 MG tablet Take 1 tablet by mouth daily Yes EAN Link   lisinopril-hydroCHLOROthiazide (PRINZIDE;ZESTORETIC) 20-25 MG per tablet Take 1 tablet by mouth daily Yes EAN Norris   Multiple Vitamins-Minerals (THERAPEUTIC MULTIVITAMIN-MINERALS) tablet Take 1 tablet by mouth daily Yes Historical Provider, MD   vitamin D (CHOLECALCIFEROL) 1000 UNIT TABS tablet Take 1,000 Units by mouth daily Yes Historical Provider, MD        No Known Allergies    Past Medical History:   Diagnosis Date    Charcot-Liliana-Tooth disease     Hyperlipemia     Hypertension     Mixed hyperlipidemia 1/15/2020       Past Surgical History:   Procedure Laterality Date    BREAST BIOPSY      COLONOSCOPY      repeat in 2022     THYROID BIOPSY  8/16/2021     THYROID BIOPSY 8/16/2021 [facility] ULTRASOUND    VASECTOMY         Social History     Socioeconomic History    Marital status:      Spouse name: Not on file    Number of children: 2    Years of education: HS    Highest education level: Not on file   Occupational History    Not on file   Tobacco Use    Smoking status: Never    Smokeless tobacco: Never   Vaping Use    Vaping Use: Never used   Substance and Sexual Activity    Alcohol use: Yes    Drug use: No    Sexual activity: Yes   Other Topics Concern    Not on file   Social History Narrative    Not on file     Social Determinants of Health     Financial Resource Strain: Low Risk     Difficulty of Paying Living Expenses: Not hard at all   Food Insecurity: No Food Insecurity    Worried About Running Out of Food in the Last Year: Never true    [address] in the Last Year: Never true   Transportation Needs: No Transportation Needs    Lack of Transportation (Medical): No    Lack of Transportation (Non-Medical):  No   Physical Activity: Not on file   Stress: Not on file   Social Connections: Not on file   Intimate Partner Violence: Not on file   Housing Stability: Low Risk     Unable to Pay for Housing in the Last Year: No    Number of Places Lived in the Last Year: 1    Unstable Housing in the Last Year: No        Family History   Problem Relation Age of Onset    Cancer Father         small cell carcinoma     Heart Disease Father         CHF    Other Mother         PAD, smoker, CMT disease    Diabetes Mother        ADVANCE DIRECTIVE: N, <no information>    Vitals:    08/23/22 0752   BP: 118/78   Site: Right Upper Arm   Position: Sitting   Cuff Size: Large Adult   Pulse: 96   Temp: 98.4 °F (36.9 °C)   TempSrc: Oral   SpO2: 97%   Weight: 248 lb (112.5 kg)   Height: 6' 2\" (1.88 m)     Estimated body mass index is 31.84 kg/m² as calculated from the following:    Height as of this encounter: 6' 2\" (1.88 m). Weight as of this encounter: 248 lb (112.5 kg). Physical Exam  Vitals reviewed. Constitutional:       Appearance: Normal appearance. He is obese. HENT:      Head: Normocephalic and atraumatic. Right Ear: Tympanic membrane and ear canal normal.      Left Ear: Tympanic membrane and ear canal normal.   Neck:      Thyroid: Thyroid mass and thyromegaly present. Cardiovascular:      Rate and Rhythm: Normal rate and regular rhythm. Heart sounds: Normal heart sounds. Pulmonary:      Effort: Pulmonary effort is normal.      Breath sounds: Normal breath sounds. Abdominal:      General: Bowel sounds are normal.      Palpations: Abdomen is soft. There is no mass. Tenderness: There is no abdominal tenderness. Musculoskeletal:      Right lower leg: No edema. Left lower leg: No edema. Neurological:      Mental Status: He is alert and oriented to person, place, and time. Cranial Nerves: No cranial nerve deficit. No flowsheet data found.     Lab Results   Component Value Date/Time    CHOL 211 02/21/2022 08:21 AM    CHOL 201 07/15/2021 08:34 AM    CHOL 203 11/06/2020 09:19 AM    TRIG 194 02/21/2022 08:21 AM    TRIG 88 07/15/2021 08:34 AM    TRIG 92 11/06/2020 09:19 AM    HDL 51 02/21/2022 08:21 AM    HDL 57 07/15/2021 08:34 AM    HDL 46 11/06/2020 09:19 AM    LDLCALC 121 02/21/2022 08:21 AM    LDLCALC 126 07/15/2021 08:34 AM    LDLCALC 139 11/06/2020 09:19 AM    GLUCOSE 110 02/21/2022 08:21 AM    LABA1C 5.6 11/06/2020 09:19 AM    LABA1C 5.5 03/04/2020 09:00 AM    LABA1C 5.7 10/07/2019 08:54 AM       The 10-year ASCVD risk score (Tyler Councilman., et al., 2013) is: 6.5%    Values used to calculate the score:      Age: 64 years      Sex: Male      Is Non- : No      Diabetic: No      Tobacco smoker: No      Systolic Blood Pressure: 962 mmHg      Is BP treated: Yes      HDL Cholesterol: 51 mg/dL      Total Cholesterol: 211 mg/dL    Immunization History   Administered Date(s) Administered    Tdap (Boostrix, Adacel) 02/10/2011, 02/21/2022       Health Maintenance   Topic Date Due    COVID-19 Vaccine (1) Never done    Shingles vaccine (1 of 2) 02/21/2023 (Originally 11/15/2015)    Flu vaccine (1) 09/01/2022    Lipids  02/21/2023    Depression Screen  08/23/2023    Diabetes screen  11/06/2023    Colorectal Cancer Screen  11/30/2027    DTaP/Tdap/Td vaccine (3 - Td or Tdap) 02/21/2032    Hepatitis C screen  Completed    Hepatitis A vaccine  Aged Out    Hepatitis B vaccine  Aged Out    Hib vaccine  Aged Out    Meningococcal (ACWY) vaccine  Aged Out    Pneumococcal 0-64 years Vaccine  Aged Out    HIV screen  Discontinued       Assessment & Plan   Physical exam, annual  -     TSH with Reflex; Future  -     Comprehensive Metabolic Panel; Future  -     CBC; Future  -     Iron and TIBC; Future  -     PSA Screening; Future  Essential hypertension  -     lisinopril-hydroCHLOROthiazide (PRINZIDE;ZESTORETIC) 20-25 MG per tablet; Take 1 tablet by mouth daily, Disp-90 tablet, R-1Normal  -     Comprehensive Metabolic Panel; Future  -     CBC; Future  -     Blood pressure is well controlled, continue with prinzide and follow up in 6 months  Mixed hyperlipidemia  -     LIPID PANEL; Future  -     adjust lipitor after lab work returns  History of chest pain  -     CT CARDIAC CALCIUM SCORING;  Future  -     Pt is aware that this may not be covered by insurance. Has not had follow up with cardiology since 2019  Family history of early CAD  -     CT CARDIAC CALCIUM SCORING; Future  Thyroid nodule  -     US THYROID; Future  -     Last US was 2 years ago, large nodule at 3.1 cm  Fatigue, unspecified type  -     TSH with Reflex; Future  -     Vitamin D 25 Hydroxy; Future  -     Iron and TIBC; Future  -     Testosterone, free, total; Future  -     If tests are negative we can consider follow up with sleep study. Pt agrees with the plan  Prostate cancer screening  -     PSA Screening;  Future  Return in about 6 months (around 2/23/2023) for HTN.         --EAN Choe

## 2022-08-23 ENCOUNTER — OFFICE VISIT (OUTPATIENT)
Dept: FAMILY MEDICINE CLINIC | Age: 57
End: 2022-08-23
Payer: COMMERCIAL

## 2022-08-23 VITALS
TEMPERATURE: 98.4 F | HEIGHT: 74 IN | HEART RATE: 96 BPM | OXYGEN SATURATION: 97 % | WEIGHT: 248 LBS | SYSTOLIC BLOOD PRESSURE: 118 MMHG | BODY MASS INDEX: 31.83 KG/M2 | DIASTOLIC BLOOD PRESSURE: 78 MMHG

## 2022-08-23 DIAGNOSIS — Z00.00 PHYSICAL EXAM, ANNUAL: ICD-10-CM

## 2022-08-23 DIAGNOSIS — E78.2 MIXED HYPERLIPIDEMIA: ICD-10-CM

## 2022-08-23 DIAGNOSIS — Z12.5 PROSTATE CANCER SCREENING: ICD-10-CM

## 2022-08-23 DIAGNOSIS — Z82.49 FAMILY HISTORY OF EARLY CAD: ICD-10-CM

## 2022-08-23 DIAGNOSIS — I10 ESSENTIAL HYPERTENSION: ICD-10-CM

## 2022-08-23 DIAGNOSIS — R53.83 FATIGUE, UNSPECIFIED TYPE: ICD-10-CM

## 2022-08-23 DIAGNOSIS — Z87.898 HISTORY OF CHEST PAIN: ICD-10-CM

## 2022-08-23 DIAGNOSIS — E04.1 THYROID NODULE: ICD-10-CM

## 2022-08-23 DIAGNOSIS — Z00.00 PHYSICAL EXAM, ANNUAL: Primary | ICD-10-CM

## 2022-08-23 LAB
A/G RATIO: 2 (ref 1.1–2.2)
ALBUMIN SERPL-MCNC: 4.7 G/DL (ref 3.4–5)
ALP BLD-CCNC: 65 U/L (ref 40–129)
ALT SERPL-CCNC: 29 U/L (ref 10–40)
ANION GAP SERPL CALCULATED.3IONS-SCNC: 12 MMOL/L (ref 3–16)
AST SERPL-CCNC: 23 U/L (ref 15–37)
BILIRUB SERPL-MCNC: 0.5 MG/DL (ref 0–1)
BUN BLDV-MCNC: 22 MG/DL (ref 7–20)
CALCIUM SERPL-MCNC: 9.5 MG/DL (ref 8.3–10.6)
CHLORIDE BLD-SCNC: 101 MMOL/L (ref 99–110)
CHOLESTEROL, TOTAL: 154 MG/DL (ref 0–199)
CO2: 26 MMOL/L (ref 21–32)
CREAT SERPL-MCNC: 0.9 MG/DL (ref 0.9–1.3)
GFR AFRICAN AMERICAN: >60
GFR NON-AFRICAN AMERICAN: >60
GLUCOSE BLD-MCNC: 109 MG/DL (ref 70–99)
HCT VFR BLD CALC: 42.5 % (ref 40.5–52.5)
HDLC SERPL-MCNC: 49 MG/DL (ref 40–60)
HEMOGLOBIN: 14.6 G/DL (ref 13.5–17.5)
IRON SATURATION: 29 % (ref 20–50)
IRON: 96 UG/DL (ref 59–158)
LDL CHOLESTEROL CALCULATED: 81 MG/DL
MCH RBC QN AUTO: 31.5 PG (ref 26–34)
MCHC RBC AUTO-ENTMCNC: 34.4 G/DL (ref 31–36)
MCV RBC AUTO: 91.6 FL (ref 80–100)
PDW BLD-RTO: 13.2 % (ref 12.4–15.4)
PLATELET # BLD: 265 K/UL (ref 135–450)
PMV BLD AUTO: 8.2 FL (ref 5–10.5)
POTASSIUM SERPL-SCNC: 4.7 MMOL/L (ref 3.5–5.1)
PROSTATE SPECIFIC ANTIGEN: 1.26 NG/ML (ref 0–4)
RBC # BLD: 4.64 M/UL (ref 4.2–5.9)
SODIUM BLD-SCNC: 139 MMOL/L (ref 136–145)
TOTAL IRON BINDING CAPACITY: 327 UG/DL (ref 260–445)
TOTAL PROTEIN: 7 G/DL (ref 6.4–8.2)
TRIGL SERPL-MCNC: 120 MG/DL (ref 0–150)
TSH REFLEX: 1.4 UIU/ML (ref 0.27–4.2)
VITAMIN D 25-HYDROXY: 53.7 NG/ML
VLDLC SERPL CALC-MCNC: 24 MG/DL
WBC # BLD: 6 K/UL (ref 4–11)

## 2022-08-23 PROCEDURE — 99396 PREV VISIT EST AGE 40-64: CPT | Performed by: PHYSICIAN ASSISTANT

## 2022-08-23 RX ORDER — LISINOPRIL AND HYDROCHLOROTHIAZIDE 25; 20 MG/1; MG/1
1 TABLET ORAL DAILY
Qty: 90 TABLET | Refills: 1 | Status: SHIPPED | OUTPATIENT
Start: 2022-08-23

## 2022-08-23 RX ORDER — ATORVASTATIN CALCIUM 20 MG/1
20 TABLET, FILM COATED ORAL DAILY
Qty: 90 TABLET | Refills: 1 | Status: SHIPPED | OUTPATIENT
Start: 2022-08-23

## 2022-08-23 SDOH — ECONOMIC STABILITY: INCOME INSECURITY: IN THE LAST 12 MONTHS, WAS THERE A TIME WHEN YOU WERE NOT ABLE TO PAY THE MORTGAGE OR RENT ON TIME?: NO

## 2022-08-23 SDOH — ECONOMIC STABILITY: FOOD INSECURITY: WITHIN THE PAST 12 MONTHS, YOU WORRIED THAT YOUR FOOD WOULD RUN OUT BEFORE YOU GOT MONEY TO BUY MORE.: NEVER TRUE

## 2022-08-23 SDOH — ECONOMIC STABILITY: HOUSING INSECURITY: IN THE LAST 12 MONTHS, HOW MANY PLACES HAVE YOU LIVED?: 1

## 2022-08-23 SDOH — ECONOMIC STABILITY: HOUSING INSECURITY
IN THE LAST 12 MONTHS, WAS THERE A TIME WHEN YOU DID NOT HAVE A STEADY PLACE TO SLEEP OR SLEPT IN A SHELTER (INCLUDING NOW)?: NO

## 2022-08-23 SDOH — ECONOMIC STABILITY: FOOD INSECURITY: WITHIN THE PAST 12 MONTHS, THE FOOD YOU BOUGHT JUST DIDN'T LAST AND YOU DIDN'T HAVE MONEY TO GET MORE.: NEVER TRUE

## 2022-08-23 SDOH — ECONOMIC STABILITY: TRANSPORTATION INSECURITY
IN THE PAST 12 MONTHS, HAS LACK OF TRANSPORTATION KEPT YOU FROM MEETINGS, WORK, OR FROM GETTING THINGS NEEDED FOR DAILY LIVING?: NO

## 2022-08-23 SDOH — ECONOMIC STABILITY: TRANSPORTATION INSECURITY
IN THE PAST 12 MONTHS, HAS THE LACK OF TRANSPORTATION KEPT YOU FROM MEDICAL APPOINTMENTS OR FROM GETTING MEDICATIONS?: NO

## 2022-08-23 ASSESSMENT — PATIENT HEALTH QUESTIONNAIRE - PHQ9
SUM OF ALL RESPONSES TO PHQ QUESTIONS 1-9: 7
10. IF YOU CHECKED OFF ANY PROBLEMS, HOW DIFFICULT HAVE THESE PROBLEMS MADE IT FOR YOU TO DO YOUR WORK, TAKE CARE OF THINGS AT HOME, OR GET ALONG WITH OTHER PEOPLE: 0
2. FEELING DOWN, DEPRESSED OR HOPELESS: 0
1. LITTLE INTEREST OR PLEASURE IN DOING THINGS: 2
3. TROUBLE FALLING OR STAYING ASLEEP: 1
4. FEELING TIRED OR HAVING LITTLE ENERGY: 1
7. TROUBLE CONCENTRATING ON THINGS, SUCH AS READING THE NEWSPAPER OR WATCHING TELEVISION: 0
SUM OF ALL RESPONSES TO PHQ QUESTIONS 1-9: 7
9. THOUGHTS THAT YOU WOULD BE BETTER OFF DEAD, OR OF HURTING YOURSELF: 0
8. MOVING OR SPEAKING SO SLOWLY THAT OTHER PEOPLE COULD HAVE NOTICED. OR THE OPPOSITE, BEING SO FIGETY OR RESTLESS THAT YOU HAVE BEEN MOVING AROUND A LOT MORE THAN USUAL: 0
5. POOR APPETITE OR OVEREATING: 3
SUM OF ALL RESPONSES TO PHQ QUESTIONS 1-9: 7
6. FEELING BAD ABOUT YOURSELF - OR THAT YOU ARE A FAILURE OR HAVE LET YOURSELF OR YOUR FAMILY DOWN: 0
SUM OF ALL RESPONSES TO PHQ QUESTIONS 1-9: 7
SUM OF ALL RESPONSES TO PHQ9 QUESTIONS 1 & 2: 2

## 2022-08-23 ASSESSMENT — ENCOUNTER SYMPTOMS
WHEEZING: 0
SHORTNESS OF BREATH: 0
COUGH: 0
DIARRHEA: 0
ABDOMINAL PAIN: 0
APNEA: 0
CONSTIPATION: 0
COLOR CHANGE: 0
BLOOD IN STOOL: 0

## 2022-08-23 ASSESSMENT — SOCIAL DETERMINANTS OF HEALTH (SDOH): HOW HARD IS IT FOR YOU TO PAY FOR THE VERY BASICS LIKE FOOD, HOUSING, MEDICAL CARE, AND HEATING?: NOT HARD AT ALL

## 2022-08-23 ASSESSMENT — ANXIETY QUESTIONNAIRES
2. NOT BEING ABLE TO STOP OR CONTROL WORRYING: 0
1. FEELING NERVOUS, ANXIOUS, OR ON EDGE: 0
IF YOU CHECKED OFF ANY PROBLEMS ON THIS QUESTIONNAIRE, HOW DIFFICULT HAVE THESE PROBLEMS MADE IT FOR YOU TO DO YOUR WORK, TAKE CARE OF THINGS AT HOME, OR GET ALONG WITH OTHER PEOPLE: NOT DIFFICULT AT ALL
3. WORRYING TOO MUCH ABOUT DIFFERENT THINGS: 0
5. BEING SO RESTLESS THAT IT IS HARD TO SIT STILL: 0
GAD7 TOTAL SCORE: 0
6. BECOMING EASILY ANNOYED OR IRRITABLE: 0
7. FEELING AFRAID AS IF SOMETHING AWFUL MIGHT HAPPEN: 0
4. TROUBLE RELAXING: 0

## 2022-08-23 NOTE — PATIENT INSTRUCTIONS
Fredi Harper MD (Attending)  [phone] (Work)  [phone] (Fax)  [address]  [facility]  [facility]

## 2022-08-24 DIAGNOSIS — R73.01 IMPAIRED FASTING GLUCOSE: Primary | ICD-10-CM

## 2022-08-24 DIAGNOSIS — R73.01 IMPAIRED FASTING GLUCOSE: ICD-10-CM

## 2022-08-24 LAB
ESTIMATED AVERAGE GLUCOSE: 116.9 MG/DL
HBA1C MFR BLD: 5.7 %

## 2022-08-25 LAB
SEX HORMONE BINDING GLOBULIN: 34 NMOL/L (ref 11–80)
TESTOSTERONE FREE-NONMALE: 58.2 PG/ML (ref 47–244)
TESTOSTERONE TOTAL: 301 NG/DL (ref 220–1000)

## 2022-08-27 DIAGNOSIS — I10 ESSENTIAL HYPERTENSION: ICD-10-CM

## 2022-08-29 RX ORDER — LISINOPRIL AND HYDROCHLOROTHIAZIDE 25; 20 MG/1; MG/1
TABLET ORAL
Qty: 90 TABLET | Refills: 1 | OUTPATIENT
Start: 2022-08-29

## 2022-08-29 RX ORDER — ATORVASTATIN CALCIUM 20 MG/1
TABLET, FILM COATED ORAL
Qty: 90 TABLET | Refills: 1 | OUTPATIENT
Start: 2022-08-29

## 2022-08-29 NOTE — TELEPHONE ENCOUNTER
.Refill Request     CONFIRM preferrred pharmacy with the patient. If Mail Order Rx - Pend for 90 day refill.       Last Seen: Last Seen Department: 8/23/2022  Last Seen by PCP: 8/23/2022    Last Written: 8-23-22 90 with 1     Next Appointment:   Future Appointments   Date Time Provider Mary Jane Sorto   2/23/2023  8:00 AM EAN Dawson  [place] [facility]       Future appointment scheduled      Requested Prescriptions     Pending Prescriptions Disp Refills    lisinopril-hydroCHLOROthiazide (PRINZIDE;ZESTORETIC) 20-25 MG per tablet [Pharmacy Med Name: LISINOPRIL-HCTZ 20-25 MG TAB] 90 tablet 1     Sig: TAKE ONE TABLET BY MOUTH DAILY    atorvastatin (LIPITOR) 20 MG tablet [Pharmacy Med Name: ATORVASTATIN 20 MG TABLET] 90 tablet 1     Sig: TAKE ONE TABLET BY MOUTH DAILY
0 = understands/communicates without difficulty

## 2022-09-19 ENCOUNTER — HOSPITAL ENCOUNTER (OUTPATIENT)
Dept: CT IMAGING | Age: 57
Discharge: HOME | End: 2022-09-19
Payer: COMMERCIAL

## 2022-09-19 ENCOUNTER — HOSPITAL ENCOUNTER (OUTPATIENT)
Dept: ULTRASOUND IMAGING | Age: 57
Discharge: HOME | End: 2022-09-19
Payer: COMMERCIAL

## 2022-09-19 DIAGNOSIS — Z82.49 FAMILY HISTORY OF EARLY CAD: ICD-10-CM

## 2022-09-19 DIAGNOSIS — Z87.898 HISTORY OF CHEST PAIN: ICD-10-CM

## 2022-09-19 DIAGNOSIS — E04.1 THYROID NODULE: ICD-10-CM

## 2022-09-19 PROCEDURE — 76536 US EXAM OF HEAD AND NECK: CPT

## 2022-09-19 PROCEDURE — 75571 CT HRT W/O DYE W/CA TEST: CPT

## 2022-11-16 ENCOUNTER — TELEMEDICINE (OUTPATIENT)
Dept: PRIMARY CARE CLINIC | Age: 57
End: 2022-11-16
Payer: COMMERCIAL

## 2022-11-16 DIAGNOSIS — J98.01 BRONCHOSPASM: ICD-10-CM

## 2022-11-16 DIAGNOSIS — R05.1 ACUTE COUGH: Primary | ICD-10-CM

## 2022-11-16 PROCEDURE — 99213 OFFICE O/P EST LOW 20 MIN: CPT | Performed by: NURSE PRACTITIONER

## 2022-11-16 RX ORDER — BENZONATATE 100 MG/1
100 CAPSULE ORAL 3 TIMES DAILY PRN
Qty: 30 CAPSULE | Refills: 0 | Status: SHIPPED | OUTPATIENT
Start: 2022-11-16 | End: 2022-11-23

## 2022-11-16 RX ORDER — ALBUTEROL SULFATE 90 UG/1
2 AEROSOL, METERED RESPIRATORY (INHALATION) EVERY 6 HOURS PRN
Qty: 18 G | Refills: 3 | Status: SHIPPED | OUTPATIENT
Start: 2022-11-16

## 2022-11-16 ASSESSMENT — ENCOUNTER SYMPTOMS: COUGH: 1

## 2022-11-16 NOTE — PROGRESS NOTES
Francesca Charles (:  1965) is a Established patient, here for evaluation of the following:    ASSESSMENT/PLAN:  Below is the assessment and plan developed based on review of pertinent history, physical exam, labs, studies, and medications. 1. Acute cough  Hot tea and honey, cough drops, delsym  -     benzonatate (TESSALON) 100 MG capsule; Take 1 capsule by mouth 3 times daily as needed for Cough, Disp-30 capsule, R-0Normal  2. Bronchospasm  -     albuterol sulfate HFA (PROVENTIL HFA) 108 (90 Base) MCG/ACT inhaler; Inhale 2 puffs into the lungs every 6 hours as needed for Wheezing, Disp-18 g, R-3Normal  No follow-ups on file. SUBJECTIVE/OBJECTIVE:  This is a 62year old patient of Ronald Delong consenting to a virtual visit today. Complaints include a cough for 3-4 days. He wakes up with coughing spells. His sputum is clear. He has increased phlegm since Monday,  his stomach was gurgling and he has loose stool. He has had some minor nasal congestion. Treatments tried at home include mucinex. It is helping his symptoms. Review of Systems   HENT:          Slight runny nose   Respiratory:  Positive for cough. Chest congestion   Gastrointestinal:         Loose stool     No flowsheet data found.       Physical Exam    [INSTRUCTIONS:  \"[x]\" Indicates a positive item  \"[]\" Indicates a negative item  -- DELETE ALL ITEMS NOT EXAMINED]    Constitutional: [x] Appears well-developed and well-nourished [x] No apparent distress      [] Abnormal -     Mental status: [x] Alert and awake  [x] Oriented to person/place/time [x] Able to follow commands    [] Abnormal -     Eyes:   EOM    [x]  Normal    [] Abnormal -   Sclera  [x]  Normal    [] Abnormal -          Discharge [x]  None visible   [] Abnormal -     HENT: [x] Normocephalic, atraumatic  [] Abnormal -   [x] Mouth/Throat: Mucous membranes are moist    External Ears [x] Normal  [] Abnormal -    Neck: [x] No visualized mass [] Abnormal - Pulmonary/Chest: [x] Respiratory effort normal   [x] No visualized signs of difficulty breathing or respiratory distress        [] Abnormal -      Musculoskeletal:   [x] Normal gait with no signs of ataxia         [x] Normal range of motion of neck        [] Abnormal -     Neurological:        [x] No Facial Asymmetry (Cranial nerve 7 motor function) (limited exam due to video visit)          [x] No gaze palsy        [] Abnormal -          Skin:        [x] No significant exanthematous lesions or discoloration noted on facial skin         [] Abnormal -            Psychiatric:       [x] Normal Affect [] Abnormal -       Other pertinent observable physical exam findings:-      On this date 11/16/2022 I have spent 20 minutes reviewing previous notes, test results and face to face (virtual) with the patient discussing the diagnosis and importance of compliance with the treatment plan as well as documenting on the day of the visit. Jasbir Wellington, was evaluated through a synchronous (real-time) audio-video encounter. The patient (or guardian if applicable) is aware that this is a billable service, which includes applicable co-pays. This Virtual Visit was conducted with patient's (and/or legal guardian's) consent. The visit was conducted pursuant to the emergency declaration under the [facility], [address] [place] [address] authority and the [employer] General Act. Patient identification was verified, and a caregiver was present when appropriate. The patient was located at home in a state where the provider was licensed to provide care.     --GERRY Lemon

## 2023-02-23 ENCOUNTER — OFFICE VISIT (OUTPATIENT)
Dept: FAMILY MEDICINE CLINIC | Age: 58
End: 2023-02-23
Payer: COMMERCIAL

## 2023-02-23 VITALS
SYSTOLIC BLOOD PRESSURE: 102 MMHG | DIASTOLIC BLOOD PRESSURE: 68 MMHG | OXYGEN SATURATION: 95 % | HEART RATE: 88 BPM | BODY MASS INDEX: 31.97 KG/M2 | WEIGHT: 249 LBS

## 2023-02-23 DIAGNOSIS — I10 ESSENTIAL HYPERTENSION: Primary | ICD-10-CM

## 2023-02-23 DIAGNOSIS — E78.2 MIXED HYPERLIPIDEMIA: ICD-10-CM

## 2023-02-23 PROCEDURE — 3078F DIAST BP <80 MM HG: CPT | Performed by: PHYSICIAN ASSISTANT

## 2023-02-23 PROCEDURE — 3074F SYST BP LT 130 MM HG: CPT | Performed by: PHYSICIAN ASSISTANT

## 2023-02-23 PROCEDURE — 99214 OFFICE O/P EST MOD 30 MIN: CPT | Performed by: PHYSICIAN ASSISTANT

## 2023-02-23 RX ORDER — LISINOPRIL AND HYDROCHLOROTHIAZIDE 12.5; 1 MG/1; MG/1
1 TABLET ORAL DAILY
Qty: 30 TABLET | Refills: 5 | Status: SHIPPED | OUTPATIENT
Start: 2023-02-23

## 2023-02-23 ASSESSMENT — ENCOUNTER SYMPTOMS
SHORTNESS OF BREATH: 0
CHEST TIGHTNESS: 0

## 2023-02-23 ASSESSMENT — PATIENT HEALTH QUESTIONNAIRE - PHQ9
10. IF YOU CHECKED OFF ANY PROBLEMS, HOW DIFFICULT HAVE THESE PROBLEMS MADE IT FOR YOU TO DO YOUR WORK, TAKE CARE OF THINGS AT HOME, OR GET ALONG WITH OTHER PEOPLE: 0
4. FEELING TIRED OR HAVING LITTLE ENERGY: 1
5. POOR APPETITE OR OVEREATING: 0
9. THOUGHTS THAT YOU WOULD BE BETTER OFF DEAD, OR OF HURTING YOURSELF: 0
7. TROUBLE CONCENTRATING ON THINGS, SUCH AS READING THE NEWSPAPER OR WATCHING TELEVISION: 0
2. FEELING DOWN, DEPRESSED OR HOPELESS: 0
DEPRESSION UNABLE TO ASSESS: FUNCTIONAL CAPACITY MOTIVATION LIMITS ACCURACY
SUM OF ALL RESPONSES TO PHQ QUESTIONS 1-9: 2
8. MOVING OR SPEAKING SO SLOWLY THAT OTHER PEOPLE COULD HAVE NOTICED. OR THE OPPOSITE, BEING SO FIGETY OR RESTLESS THAT YOU HAVE BEEN MOVING AROUND A LOT MORE THAN USUAL: 0
SUM OF ALL RESPONSES TO PHQ QUESTIONS 1-9: 2
3. TROUBLE FALLING OR STAYING ASLEEP: 1
SUM OF ALL RESPONSES TO PHQ9 QUESTIONS 1 & 2: 0
1. LITTLE INTEREST OR PLEASURE IN DOING THINGS: 0
6. FEELING BAD ABOUT YOURSELF - OR THAT YOU ARE A FAILURE OR HAVE LET YOURSELF OR YOUR FAMILY DOWN: 0

## 2023-02-23 NOTE — PROGRESS NOTES
Agusto Webster (:  1965) is a 62 y.o. male,Established patient, here for evaluation of the following chief complaint(s):  Hypertension         ASSESSMENT/PLAN:  1. Essential hypertension  -     lisinopril-hydroCHLOROthiazide (PRINZIDE;ZESTORETIC) 10-12.5 MG per tablet; Take 1 tablet by mouth daily, Disp-30 tablet, R-5Normal  -     blood pressure is low normal. Will reduce her medication to 10-12.5 mg. Pt will message me in two weeks with blood pressure readings from home, morning and a few evening readings, so we can further adjust medication to avoid dizziness. Reviewed lab work, next labs in 6 months  2. Mixed hyperlipidemia       -    Discussed ways to improve diet when eating out. Encouraged to drink more water and reduce soda intake. Consider exercise outside of work. Continue with lipitor. fasting lab work at the next appointment. Follow up in 6 months  Return in about 6 months (around 2023) for HTN. Subjective   SUBJECTIVE/OBJECTIVE:  HPI  HTN:  Current medication: prinzide  Side effects: none  Home blood pressure readings: does not check  Reports: dizziness when bending over  Denies: lower extremity swelling, chest pressure, headaches    HLD:  Current medication: lipitor  Side effects: none  Diet: eats out a lot  Exercise: nothing outside of work, 22936 steps per day at work  Weight: stable    Shingles: will consider  Flu: declines  COVID: declines    Review of Systems   Constitutional:  Positive for fatigue. Negative for activity change, appetite change, diaphoresis and unexpected weight change. Respiratory:  Negative for chest tightness and shortness of breath. Cardiovascular:  Negative for chest pain, palpitations and leg swelling. Skin:  Negative for pallor. Neurological:  Positive for dizziness. Negative for light-headedness and headaches. Hematological:  Does not bruise/bleed easily. Objective   Physical Exam  Vitals reviewed.    Constitutional: Appearance: Normal appearance. HENT:      Head: Normocephalic and atraumatic. Eyes:      Conjunctiva/sclera: Conjunctivae normal.   Neck:      Vascular: No carotid bruit. Cardiovascular:      Rate and Rhythm: Normal rate and regular rhythm. Heart sounds: Normal heart sounds. Pulmonary:      Effort: Pulmonary effort is normal.      Breath sounds: Normal breath sounds. Musculoskeletal:      Right lower leg: No edema. Left lower leg: No edema. Neurological:      General: No focal deficit present. Mental Status: He is alert and oriented to person, place, and time. Cranial Nerves: No cranial nerve deficit. An electronic signature was used to authenticate this note.     --EAN Bradley

## 2023-02-23 NOTE — PATIENT INSTRUCTIONS
Check your blood pressure over the next two weeks and message me with your readings. Check 2 hours after taking your medication and a few times in the evening as well.

## 2023-03-06 DIAGNOSIS — I10 ESSENTIAL HYPERTENSION: ICD-10-CM

## 2023-03-06 RX ORDER — ATORVASTATIN CALCIUM 20 MG/1
TABLET, FILM COATED ORAL
Qty: 90 TABLET | Refills: 1 | Status: SHIPPED | OUTPATIENT
Start: 2023-03-06

## 2023-03-06 RX ORDER — LISINOPRIL AND HYDROCHLOROTHIAZIDE 25; 20 MG/1; MG/1
TABLET ORAL
Qty: 90 TABLET | Refills: 1 | OUTPATIENT
Start: 2023-03-06

## 2023-03-09 ENCOUNTER — TELEPHONE (OUTPATIENT)
Dept: FAMILY MEDICINE CLINIC | Age: 58
End: 2023-03-09

## 2023-03-09 RX ORDER — LISINOPRIL AND HYDROCHLOROTHIAZIDE 25; 20 MG/1; MG/1
1 TABLET ORAL DAILY
Qty: 90 TABLET | Refills: 1 | Status: SHIPPED | OUTPATIENT
Start: 2023-03-09

## 2023-03-09 NOTE — TELEPHONE ENCOUNTER
Patient states he has checked it the last 3-4 days in the morning. He said the top number has been running high around 152 to 140 but the bottom number has been great at 80. He think he would like to go back on the higher dosage of lisinopril.  Please advise

## 2023-03-09 NOTE — TELEPHONE ENCOUNTER
Can we please check on the patient and see how his blood pressure readings have looked at home since we decreased his blood pressure medication?

## 2023-07-26 ENCOUNTER — TELEPHONE (OUTPATIENT)
Dept: FAMILY MEDICINE CLINIC | Age: 58
End: 2023-07-26

## 2023-07-26 DIAGNOSIS — Z12.11 COLON CANCER SCREENING: Primary | ICD-10-CM

## 2023-07-26 NOTE — TELEPHONE ENCOUNTER
Below is the referral for screening colonoscopy:    [address], [place]  [address] [place] [address], [address] [place] [address], [address] [place] [address] Street  Phone: [phone]  Fax: [phone]    We did his prostate cancer screen on 08/23 at his physical so we will repeat this year as well.     Thank you

## 2023-07-26 NOTE — TELEPHONE ENCOUNTER
----- Message from Jesse Christianson sent at 7/26/2023  2:15 PM EDT -----  Subject: Referral Request    Reason for referral request? Pt would like to have an order for a   colonscopy(order needs to be marked preventative for insurance purposes). Please contact once order has been added to chart. Pt would also like to   know if he needs Prostate Exam if so please add order to chart as well. Provider patient wants to be referred to(if known):     Provider Phone Number(if known):     Additional Information for Provider?   ---------------------------------------------------------------------------  --------------  [address]    [phone]; OK to leave message on voicemail  ---------------------------------------------------------------------------  --------------

## 2023-08-16 NOTE — PROGRESS NOTES
Jeffrie Priestly    Age 62 y.o.    male    1965    MRN [id number]    8/22/2023  Arrival Time_____________  OR Time____________[address]     Procedure(s):  COLONOSCOPY                      General    Surgeon(s):  Em Monetke, MD       Phone [phone] ([place])     [facility]  [facility]         Work  _____________________________________________________________________  _____________________________________________________________________  _____________________________________________________________________  _____________________________________________________________________  _____________________________________________________________________    PCP _____________________________ Phone_________________     H&P__________________Bringing      Chart            Epic   DOS      Called________  EKG__________________Bringing      Chart            Epic   DOS      Called________  LAB__________________ Bringing      Chart            Epic   DOS      Called________  Cardiac Clearance_______Bringing      Chart            Epic      DOS      Called________    Cardiologist________________________ Phone___________________________    ? Anglican concerns / Waiver on Chart            PAT Communications________________  ? Pre-op Instructions Given [address]          _________________________________  ? Directions to Surgery Center                          _________________________________  ? Transportation Home_______________      __________________________________  ?  Crutches/Walker__________________        __________________________________    ________Pre-op Orders   _______Transcribed    _______Wt.  ________Pharmacy          _______SCD  ______VTE     ______TED Eren Cake  _______  Surgery Consent    _______  Anesthesia Consent         COVID DATE______________LOCATION________________ RESULT__________

## 2023-08-16 NOTE — PROGRESS NOTES
Date and time of surgery : 08/22/23             Arrival Time:  0800     Bring Picture ID and insurance card. Please wear simple, loose fitting clothing to the hospital.   Leartis Cleverly not bring valuables (money, credit cards, checkbooks, etc.)   Do not wear any makeup (including  eye makeup) and no nail polish or artificial nails on your fingers or toes. DO NOT wear any jewelry or piercings on day of surgery. All body piercing jewelry must be removed. If you have dentures, they will be removed before going to the OR; we will provide you a container. If you wear contact lenses or glasses, they will be removed; please bring a case for them. Nothing to eat or drink after midnight the day before surgery. You may brush your teeth and gargle the morning of surgery. DO NOT SWALLOW WATER. Do not take any morning meds the day of your surgery. Aspirin, Ibuprofen, Advil, Naproxen, Vitamin E and other Anti-inflammatory products and supplements should be stopped for 5 -7days before surgery or as directed by your physician. Do not smoke or drink any alcoholic beverages 24 hours prior to surgery. This includes NA Beer. Refrain from the usage of any recreational drugs, including non-prescribed prescription drugs. You MUST plan for a responsible adult to stay on site while you are here and take you home after your surgery. You will not be allowed to leave alone or drive yourself home. It is strongly suggested someone stay with you the first 24 hrs. Your surgery will be cancelled if you do not have a ride home. If you  have a Living Will and Durable Power of  for Healthcare, please bring in a copy. Notify your Surgeon if you develop any illness between now and time of surgery.  Cough, cold, fever, sore throat, nausea, vomiting, etc.  Please notify your surgeon if you experience dizziness, shortness of breath or blurred vision between now & the time of your surgery  To provide excellent care visitors will be limited

## 2023-08-22 ENCOUNTER — ANESTHESIA EVENT (OUTPATIENT)
Dept: ENDOSCOPY | Age: 58
End: 2023-08-22
Payer: COMMERCIAL

## 2023-08-22 ENCOUNTER — ANESTHESIA (OUTPATIENT)
Dept: ENDOSCOPY | Age: 58
End: 2023-08-22
Payer: COMMERCIAL

## 2023-08-22 ENCOUNTER — HOSPITAL ENCOUNTER (OUTPATIENT)
Age: 58
Setting detail: OUTPATIENT SURGERY
Discharge: HOME | End: 2023-08-22
Attending: INTERNAL MEDICINE | Admitting: INTERNAL MEDICINE
Payer: COMMERCIAL

## 2023-08-22 VITALS
RESPIRATION RATE: 18 BRPM | WEIGHT: 245 LBS | HEART RATE: 67 BPM | TEMPERATURE: 97.3 F | DIASTOLIC BLOOD PRESSURE: 87 MMHG | SYSTOLIC BLOOD PRESSURE: 138 MMHG | BODY MASS INDEX: 32.47 KG/M2 | HEIGHT: 73 IN | OXYGEN SATURATION: 100 %

## 2023-08-22 PROCEDURE — 3700000001 HC ADD 15 MINUTES (ANESTHESIA): Performed by: INTERNAL MEDICINE

## 2023-08-22 PROCEDURE — 2709999900 HC NON-CHARGEABLE SUPPLY: Performed by: INTERNAL MEDICINE

## 2023-08-22 PROCEDURE — 2580000003 HC RX 258: Performed by: NURSE ANESTHETIST, CERTIFIED REGISTERED

## 2023-08-22 PROCEDURE — 2580000003 HC RX 258: Performed by: ANESTHESIOLOGY

## 2023-08-22 PROCEDURE — 7100000010 HC PHASE II RECOVERY - FIRST 15 MIN: Performed by: INTERNAL MEDICINE

## 2023-08-22 PROCEDURE — 2500000003 HC RX 250 WO HCPCS: Performed by: NURSE ANESTHETIST, CERTIFIED REGISTERED

## 2023-08-22 PROCEDURE — 3609027000 HC COLONOSCOPY: Performed by: INTERNAL MEDICINE

## 2023-08-22 PROCEDURE — 7100000011 HC PHASE II RECOVERY - ADDTL 15 MIN: Performed by: INTERNAL MEDICINE

## 2023-08-22 PROCEDURE — 3700000000 HC ANESTHESIA ATTENDED CARE: Performed by: INTERNAL MEDICINE

## 2023-08-22 PROCEDURE — 6360000002 HC RX W HCPCS: Performed by: NURSE ANESTHETIST, CERTIFIED REGISTERED

## 2023-08-22 RX ORDER — SODIUM CHLORIDE 9 MG/ML
INJECTION, SOLUTION INTRAVENOUS PRN
Status: DISCONTINUED | OUTPATIENT
Start: 2023-08-22 | End: 2023-08-22 | Stop reason: HOSPADM

## 2023-08-22 RX ORDER — SODIUM CHLORIDE 0.9 % (FLUSH) 0.9 %
5-40 SYRINGE (ML) INJECTION EVERY 12 HOURS SCHEDULED
Status: DISCONTINUED | OUTPATIENT
Start: 2023-08-22 | End: 2023-08-22 | Stop reason: HOSPADM

## 2023-08-22 RX ORDER — LIDOCAINE HYDROCHLORIDE 10 MG/ML
0.3 INJECTION, SOLUTION EPIDURAL; INFILTRATION; INTRACAUDAL; PERINEURAL
Status: DISCONTINUED | OUTPATIENT
Start: 2023-08-22 | End: 2023-08-22 | Stop reason: HOSPADM

## 2023-08-22 RX ORDER — SODIUM CHLORIDE 0.9 % (FLUSH) 0.9 %
5-40 SYRINGE (ML) INJECTION PRN
Status: DISCONTINUED | OUTPATIENT
Start: 2023-08-22 | End: 2023-08-22 | Stop reason: HOSPADM

## 2023-08-22 RX ORDER — PROPOFOL 10 MG/ML
INJECTION, EMULSION INTRAVENOUS PRN
Status: DISCONTINUED | OUTPATIENT
Start: 2023-08-22 | End: 2023-08-22 | Stop reason: SDUPTHER

## 2023-08-22 RX ORDER — SODIUM CHLORIDE, SODIUM LACTATE, POTASSIUM CHLORIDE, CALCIUM CHLORIDE 600; 310; 30; 20 MG/100ML; MG/100ML; MG/100ML; MG/100ML
INJECTION, SOLUTION INTRAVENOUS CONTINUOUS
Status: DISCONTINUED | OUTPATIENT
Start: 2023-08-22 | End: 2023-08-22 | Stop reason: HOSPADM

## 2023-08-22 RX ORDER — LIDOCAINE HYDROCHLORIDE 20 MG/ML
INJECTION, SOLUTION INFILTRATION; PERINEURAL PRN
Status: DISCONTINUED | OUTPATIENT
Start: 2023-08-22 | End: 2023-08-22 | Stop reason: SDUPTHER

## 2023-08-22 RX ORDER — SODIUM CHLORIDE, SODIUM LACTATE, POTASSIUM CHLORIDE, CALCIUM CHLORIDE 600; 310; 30; 20 MG/100ML; MG/100ML; MG/100ML; MG/100ML
INJECTION, SOLUTION INTRAVENOUS CONTINUOUS PRN
Status: DISCONTINUED | OUTPATIENT
Start: 2023-08-22 | End: 2023-08-22 | Stop reason: SDUPTHER

## 2023-08-22 RX ADMIN — SODIUM CHLORIDE, SODIUM LACTATE, POTASSIUM CHLORIDE, AND CALCIUM CHLORIDE: .6; .31; .03; .02 INJECTION, SOLUTION INTRAVENOUS at 08:50

## 2023-08-22 RX ADMIN — SODIUM CHLORIDE, POTASSIUM CHLORIDE, SODIUM LACTATE AND CALCIUM CHLORIDE: 600; 310; 30; 20 INJECTION, SOLUTION INTRAVENOUS at 08:26

## 2023-08-22 RX ADMIN — PROPOFOL 400 MG: 10 INJECTION, EMULSION INTRAVENOUS at 08:58

## 2023-08-22 RX ADMIN — LIDOCAINE HYDROCHLORIDE 60 MG: 20 INJECTION, SOLUTION INFILTRATION; PERINEURAL at 08:58

## 2023-08-22 ASSESSMENT — PAIN - FUNCTIONAL ASSESSMENT: PAIN_FUNCTIONAL_ASSESSMENT: 0-10

## 2023-08-22 NOTE — PROCEDURES
Colonoscopy Procedure  Note          Patient: Altagracia Richter  : 1965  CSN:     Procedure: Colonoscopy    Date:  2023    Surgeon:  Martina Rutherford MD, MD    Referring Provider:  Maria E Sawyer    Preoperative Diagnosis:  Screening for colon cancer    Postoperative Diagnosis:  Normal colonoscopy    Anesthesia:  Propofol    EBL: <5 mL    Indications: This is a 62y.o. year old male who presents today with screening for colon cancer. Procedure: An informed consent was obtained from the patient after explanation of indications, benefits, possible risks and complications of the procedure. The patient was then taken to the endoscopy suite, placed in the left lateral decubitus position, and the above IV anesthesia was administered. With the patient in left lateral decubitus position the endoscope was inserted through the anorectal area into the rectum. The scope was then advanced through the length of the colon to the ileum. The ileocecal valve was visualized and cannulated. The quality of preparation was good. Water was insufflated through the biopsy channel to clean out the colon and look at the underlying mucosa. The scope was carefully withdrawn and found to be normal.    Digital rectal examination was performed, no abnormalities noted. The scope was advanced all the way to the cecum, the mucosa appeared normal.  Appendix was identified. The terminal ileum was briefly intubated, the mucosa appeared normal.  The mucosa in the ascending, transverse, descending, sigmoid and rectum appeared normal.  On retroflexion hemorrhoids were noted. The scope was straightened, the colon was decompressed and the scope was withdrawn from the patient. The patient tolerated the procedure well and was taken to the PACU in good condition. Impression:  Normal colonoscopy to the cecum with no evidence of neoplasia, diverticular disease or mucosal abnormality.     Recommendations:  Repeat colonoscopy in

## 2023-08-23 ENCOUNTER — OFFICE VISIT (OUTPATIENT)
Dept: FAMILY MEDICINE CLINIC | Age: 58
End: 2023-08-23
Payer: COMMERCIAL

## 2023-08-23 VITALS
OXYGEN SATURATION: 98 % | SYSTOLIC BLOOD PRESSURE: 120 MMHG | WEIGHT: 249 LBS | BODY MASS INDEX: 32.85 KG/M2 | DIASTOLIC BLOOD PRESSURE: 70 MMHG | HEART RATE: 64 BPM

## 2023-08-23 DIAGNOSIS — E04.1 THYROID NODULE: ICD-10-CM

## 2023-08-23 DIAGNOSIS — I10 ESSENTIAL HYPERTENSION: ICD-10-CM

## 2023-08-23 DIAGNOSIS — E78.2 MIXED HYPERLIPIDEMIA: ICD-10-CM

## 2023-08-23 DIAGNOSIS — I49.9 IRREGULAR HEART RATE: ICD-10-CM

## 2023-08-23 DIAGNOSIS — R73.03 PREDIABETES: ICD-10-CM

## 2023-08-23 DIAGNOSIS — Z12.5 PROSTATE CANCER SCREENING: ICD-10-CM

## 2023-08-23 DIAGNOSIS — I48.91 NEW ONSET A-FIB (HCC): ICD-10-CM

## 2023-08-23 DIAGNOSIS — I48.91 NEW ONSET A-FIB (HCC): Primary | ICD-10-CM

## 2023-08-23 DIAGNOSIS — G60.0 CMT (CHARCOT-MARIE-TOOTH DISEASE): ICD-10-CM

## 2023-08-23 LAB
ALBUMIN SERPL-MCNC: 4.6 G/DL (ref 3.4–5)
ALBUMIN/GLOB SERPL: 1.9 {RATIO} (ref 1.1–2.2)
ALP SERPL-CCNC: 70 U/L (ref 40–129)
ALT SERPL-CCNC: 27 U/L (ref 10–40)
ANION GAP SERPL CALCULATED.3IONS-SCNC: 15 MMOL/L (ref 3–16)
AST SERPL-CCNC: 24 U/L (ref 15–37)
BILIRUB SERPL-MCNC: 0.6 MG/DL (ref 0–1)
BUN SERPL-MCNC: 15 MG/DL (ref 7–20)
CALCIUM SERPL-MCNC: 9.6 MG/DL (ref 8.3–10.6)
CHLORIDE SERPL-SCNC: 104 MMOL/L (ref 99–110)
CHOLEST SERPL-MCNC: 149 MG/DL (ref 0–199)
CO2 SERPL-SCNC: 25 MMOL/L (ref 21–32)
CREAT SERPL-MCNC: 1.1 MG/DL (ref 0.9–1.3)
DEPRECATED RDW RBC AUTO: 13.6 % (ref 12.4–15.4)
GFR SERPLBLD CREATININE-BSD FMLA CKD-EPI: >60 ML/MIN/{1.73_M2}
GLUCOSE SERPL-MCNC: 104 MG/DL (ref 70–99)
HCT VFR BLD AUTO: 46.5 % (ref 40.5–52.5)
HDLC SERPL-MCNC: 44 MG/DL (ref 40–60)
HGB BLD-MCNC: 15.4 G/DL (ref 13.5–17.5)
LDLC SERPL CALC-MCNC: 75 MG/DL
MAGNESIUM SERPL-MCNC: 2.3 MG/DL (ref 1.8–2.4)
MCH RBC QN AUTO: 30.7 PG (ref 26–34)
MCHC RBC AUTO-ENTMCNC: 33.1 G/DL (ref 31–36)
MCV RBC AUTO: 92.8 FL (ref 80–100)
PLATELET # BLD AUTO: 298 K/UL (ref 135–450)
PMV BLD AUTO: 8.4 FL (ref 5–10.5)
POTASSIUM SERPL-SCNC: 4.6 MMOL/L (ref 3.5–5.1)
PROT SERPL-MCNC: 7 G/DL (ref 6.4–8.2)
PSA SERPL DL<=0.01 NG/ML-MCNC: 2.13 NG/ML (ref 0–4)
RBC # BLD AUTO: 5.01 M/UL (ref 4.2–5.9)
SODIUM SERPL-SCNC: 144 MMOL/L (ref 136–145)
T4 FREE SERPL-MCNC: 1.1 NG/DL (ref 0.9–1.8)
TRIGL SERPL-MCNC: 151 MG/DL (ref 0–150)
TSH SERPL DL<=0.005 MIU/L-ACNC: 1.45 UIU/ML (ref 0.27–4.2)
VLDLC SERPL CALC-MCNC: 30 MG/DL
WBC # BLD AUTO: 8.8 K/UL (ref 4–11)

## 2023-08-23 PROCEDURE — 99215 OFFICE O/P EST HI 40 MIN: CPT | Performed by: PHYSICIAN ASSISTANT

## 2023-08-23 PROCEDURE — 3078F DIAST BP <80 MM HG: CPT | Performed by: PHYSICIAN ASSISTANT

## 2023-08-23 PROCEDURE — 3074F SYST BP LT 130 MM HG: CPT | Performed by: PHYSICIAN ASSISTANT

## 2023-08-23 PROCEDURE — 93000 ELECTROCARDIOGRAM COMPLETE: CPT | Performed by: PHYSICIAN ASSISTANT

## 2023-08-23 RX ORDER — METOPROLOL SUCCINATE 25 MG/1
25 TABLET, EXTENDED RELEASE ORAL DAILY
Qty: 90 TABLET | Refills: 1 | Status: SHIPPED | OUTPATIENT
Start: 2023-08-23

## 2023-08-23 RX ORDER — LISINOPRIL AND HYDROCHLOROTHIAZIDE 12.5; 1 MG/1; MG/1
1 TABLET ORAL DAILY
Qty: 90 TABLET | Refills: 1 | Status: SHIPPED | OUTPATIENT
Start: 2023-08-23

## 2023-08-23 RX ORDER — ASPIRIN 81 MG/1
81 TABLET ORAL DAILY
Qty: 90 TABLET | Refills: 1 | Status: SHIPPED | OUTPATIENT
Start: 2023-08-23

## 2023-08-23 RX ORDER — ATORVASTATIN CALCIUM 20 MG/1
20 TABLET, FILM COATED ORAL DAILY
Qty: 90 TABLET | Refills: 1 | Status: SHIPPED | OUTPATIENT
Start: 2023-08-23

## 2023-08-23 ASSESSMENT — ENCOUNTER SYMPTOMS
SHORTNESS OF BREATH: 0
WHEEZING: 0
COUGH: 0

## 2023-08-23 NOTE — PROGRESS NOTES
Tamar Ramsey (:  1965) is a 62 y.o. male,Established patient, here for evaluation of the following chief complaint(s):  Hypertension         ASSESSMENT/PLAN:  1. New onset a-fib ([address])  -     T4, Free; Future  -     TSH; Future  -     Viktoria Shelton MD, Cardiac Electrophysiology, [facility]  -     Echo 2d w doppler w color w contrast; Future  -     aspirin 81 MG EC tablet; Take 1 tablet by mouth daily, Disp-90 tablet, R-1Normal  -     metoprolol succinate (TOPROL XL) 25 MG extended release tablet; Take 1 tablet by mouth daily, Disp-90 tablet, R-1Normal  -     Magnesium; Future  -     Pt is hemodynamically stable. Will start him on metoprolol and baby aspirin. Urgent referral to EP provided. Discussed concerning symptoms for which he should be seen urgently in the ER. 2. Essential hypertension  -     Comprehensive Metabolic Panel; Future  -     CBC; Future  -     lisinopril-hydroCHLOROthiazide (PRINZIDE;ZESTORETIC) 10-12.5 MG per tablet; Take 1 tablet by mouth daily, Disp-90 tablet, R-1Normal  -    low normal, likely due to a fibb. Will decrease prinzide dose since starting metoprolol  3. Mixed hyperlipidemia  -     LIPID PANEL; Future  -     atorvastatin (LIPITOR) 20 MG tablet; Take 1 tablet by mouth daily, Disp-90 tablet, R-1Normal  -   work on lifestyle management, review lipid panel and adjust dose of lipitor  4. CMT (Charcot-Liliana-Tooth disease)  -     External Referral To Neurology: pt would prefer to go to [facility] neurology  5. Prostate cancer screening  -     PSA Screening; Future  6. Irregular heart rate  -     EKG 12 lead: a fibb  7. Thyroid nodule  -     US THYROID; Future: follow up due on . Prediabetes  -     Hemoglobin A1C; Future  -     work on diet, specifically eating less      Return in about 3 months (around 2023) for a fibb, htn.          Subjective   SUBJECTIVE/OBJECTIVE:  HPI  HTN:  Current medication: prinzide  Side effects: none  Home blood pressure

## 2023-08-24 LAB
EST. AVERAGE GLUCOSE BLD GHB EST-MCNC: 114 MG/DL
HBA1C MFR BLD: 5.6 %

## 2023-09-12 ENCOUNTER — HOSPITAL ENCOUNTER (OUTPATIENT)
Dept: CARDIOLOGY | Age: 58
Discharge: HOME | End: 2023-09-12
Payer: COMMERCIAL

## 2023-09-12 DIAGNOSIS — I48.91 NEW ONSET A-FIB (HCC): ICD-10-CM

## 2023-09-12 PROCEDURE — 93306 TTE W/DOPPLER COMPLETE: CPT

## 2023-09-12 PROCEDURE — 6360000004 HC RX CONTRAST MEDICATION: Performed by: PHYSICIAN ASSISTANT

## 2023-09-12 PROCEDURE — C8929 TTE W OR WO FOL WCON,DOPPLER: HCPCS

## 2023-09-12 RX ADMIN — PERFLUTREN 1.5 ML: 6.52 INJECTION, SUSPENSION INTRAVENOUS at 15:17

## 2023-09-13 ENCOUNTER — TELEPHONE (OUTPATIENT)
Dept: FAMILY MEDICINE CLINIC | Age: 58
End: 2023-09-13

## 2023-09-13 DIAGNOSIS — I48.91 NEW ONSET A-FIB (HCC): Primary | ICD-10-CM

## 2023-09-13 DIAGNOSIS — R93.1 ABNORMAL ECHOCARDIOGRAM: Primary | ICD-10-CM

## 2023-09-13 DIAGNOSIS — I51.89 HYPERKINETIC HEART DISEASE: ICD-10-CM

## 2023-09-13 NOTE — TELEPHONE ENCOUNTER
Patient calling because he called to schedule an appt with Dr. Ibrahima Barber and they cannot get him in till the end of Oct. Patient is calling to check with Francesca Garcia to make sure that's okay if he waits that long or if he needs to be seen sooner.  Please Advise

## 2023-09-14 PROBLEM — I48.91 NEW ONSET A-FIB (HCC): Status: ACTIVE | Noted: 2023-09-14

## 2023-09-14 NOTE — TELEPHONE ENCOUNTER
Let's go ahead and get a stress test, if this is abnormal we will be able to get him in faster.  Order has been placed

## 2023-10-05 ENCOUNTER — HOSPITAL ENCOUNTER (OUTPATIENT)
Dept: CARDIOLOGY | Age: 58
Discharge: HOME | End: 2023-10-05
Payer: COMMERCIAL

## 2023-10-05 DIAGNOSIS — I48.91 NEW ONSET A-FIB (HCC): ICD-10-CM

## 2023-10-05 PROCEDURE — A9502 TC99M TETROFOSMIN: HCPCS | Performed by: PHYSICIAN ASSISTANT

## 2023-10-05 PROCEDURE — 78452 HT MUSCLE IMAGE SPECT MULT: CPT

## 2023-10-05 PROCEDURE — 93017 CV STRESS TEST TRACING ONLY: CPT

## 2023-10-05 PROCEDURE — 3430000000 HC RX DIAGNOSTIC RADIOPHARMACEUTICAL: Performed by: PHYSICIAN ASSISTANT

## 2023-10-05 RX ADMIN — TETROFOSMIN 33 MILLICURIE: 1.38 INJECTION, POWDER, LYOPHILIZED, FOR SOLUTION INTRAVENOUS at 09:30

## 2023-10-05 RX ADMIN — TETROFOSMIN 10.6 MILLICURIE: 1.38 INJECTION, POWDER, LYOPHILIZED, FOR SOLUTION INTRAVENOUS at 08:03

## 2023-10-17 NOTE — PROGRESS NOTES
[facility]   Electrophysiology Consultation     Date: 10/19/2023  Reason for Consultation: PAF   Consult Requesting Physician: EAN Foster     CC: atrial fibrillation     HPI: Indra Fisher is a 62 y.o. male history of hypertension and hyperlipidemia. He was initially seen by PCP on 8/2023 at that time found to be in atrial fibrillation. EKG with atrial fibrillation\" controlled rates, heart rate 106. He was started on metoprolol 25 mg daily. He also underwent an echo in September which demonstrated normal LVEF with some wall motion abnormalities, subsequent stress test (Jonatan protocol) with excellent METS, stopped for target heart rate, and 9 minutes, no evidence of ischemia but had mildly reduced LVEF of 40 to 45%, referred to general cardiology. Patient presents today as a new patient for evaluation and management of atrial fibrillation. This is a new diagnosis for him. He doesn't feel short of breath with episodes, however he does feel occasional palpitations a couple times a week. His symptoms are brief in duration. He has been more fatigued continues to work daily, manual labor. He brought in a home BP log for review and appears well controlled (high systolics in the 779P). He states his girlfriend does report he snores, however he has never been officially tested for sleep apnea. Patient otherwise doing well, denies chest pain, shortness of breath, dizziness or lightheadedness, syncope, PND/orthopnea. Review of System:  [x] Full ROS obtained and negative except as mentioned in HPI or below      Prior to Admission medications    Medication Sig Start Date End Date Taking?  Authorizing Provider   atorvastatin (LIPITOR) 20 MG tablet Take 1 tablet by mouth daily 8/23/23  Yes EAN Foster   aspirin 81 MG EC tablet Take 1 tablet by mouth daily 8/23/23  Yes Mia Alberto PA   metoprolol succinate (TOPROL XL) 25 MG extended release tablet Take 1 tablet by mouth daily

## 2023-10-19 ENCOUNTER — OFFICE VISIT (OUTPATIENT)
Dept: CARDIOLOGY CLINIC | Age: 58
End: 2023-10-19
Payer: COMMERCIAL

## 2023-10-19 ENCOUNTER — TELEPHONE (OUTPATIENT)
Dept: CARDIOLOGY CLINIC | Age: 58
End: 2023-10-19

## 2023-10-19 VITALS
WEIGHT: 246 LBS | BODY MASS INDEX: 32.6 KG/M2 | DIASTOLIC BLOOD PRESSURE: 66 MMHG | OXYGEN SATURATION: 100 % | HEART RATE: 84 BPM | HEIGHT: 73 IN | SYSTOLIC BLOOD PRESSURE: 132 MMHG

## 2023-10-19 DIAGNOSIS — R06.83 SNORING: ICD-10-CM

## 2023-10-19 DIAGNOSIS — I48.91 NEW ONSET A-FIB (HCC): Primary | ICD-10-CM

## 2023-10-19 PROCEDURE — G8417 CALC BMI ABV UP PARAM F/U: HCPCS | Performed by: INTERNAL MEDICINE

## 2023-10-19 PROCEDURE — 1036F TOBACCO NON-USER: CPT | Performed by: INTERNAL MEDICINE

## 2023-10-19 PROCEDURE — 3075F SYST BP GE 130 - 139MM HG: CPT | Performed by: INTERNAL MEDICINE

## 2023-10-19 PROCEDURE — 93000 ELECTROCARDIOGRAM COMPLETE: CPT | Performed by: INTERNAL MEDICINE

## 2023-10-19 PROCEDURE — 93228 REMOTE 30 DAY ECG REV/REPORT: CPT | Performed by: INTERNAL MEDICINE

## 2023-10-19 PROCEDURE — G8484 FLU IMMUNIZE NO ADMIN: HCPCS | Performed by: INTERNAL MEDICINE

## 2023-10-19 PROCEDURE — G8427 DOCREV CUR MEDS BY ELIG CLIN: HCPCS | Performed by: INTERNAL MEDICINE

## 2023-10-19 PROCEDURE — 99214 OFFICE O/P EST MOD 30 MIN: CPT | Performed by: INTERNAL MEDICINE

## 2023-10-19 PROCEDURE — 3017F COLORECTAL CA SCREEN DOC REV: CPT | Performed by: INTERNAL MEDICINE

## 2023-10-19 PROCEDURE — 3078F DIAST BP <80 MM HG: CPT | Performed by: INTERNAL MEDICINE

## 2023-10-19 NOTE — TELEPHONE ENCOUNTER
Monitor placed by [facility]  Monitor company [employer]  Length of monitor 14DAYS  Monitor ordered by [employer]  Serial number [id number]  Kit ID [id number]  Activation successful prior to pt leaving office?  Yes

## 2023-10-19 NOTE — PATIENT INSTRUCTIONS
Discussed diagnosis of atrial fibrillation (literature provided)  -discussed monitoring with [place], [employer] [address] or blood pressure cuff with atrial fibrillation monitoring capabilies  Recommend 2 week cardiac event monitor   Referral placed to Sleep Medicine for sleep apnea evaluation   Follow up with me in 6 months

## 2023-10-27 ENCOUNTER — TELEPHONE (OUTPATIENT)
Dept: CARDIOLOGY CLINIC | Age: 58
End: 2023-10-27

## 2023-10-27 NOTE — TELEPHONE ENCOUNTER
View All Conversations on this Encounter   Walker Hernandez MD  You 1 hour ago (2:15 PM)     Can see if he has symptoms, short runs, has appointment with me on 11/7       Spoke with patient. He is not symptomatic. Will continue to monitor.

## 2023-10-27 NOTE — TELEPHONE ENCOUNTER
VC reports multiple events of known afib with RVR with bpm 118-185, lasting from 45 seconds to 2 min 35 seconds. VC tried calling pt but no answer. Please advise.

## 2023-11-06 NOTE — PROGRESS NOTES
[facility]   Cardiac Consultation    Referring Provider:  EAN Mulligan     Chief Complaint   Patient presents with    New Patient    Atrial Fibrillation    Hypertension    Hyperlipidemia      Hodan Mcallistre   1965    History of Present Illness:    Hodan Mcallister is a 62 y.o. [facility] today as a new patient consult at the request of Kay Mulligan for reduced EF. He had a past medical history of hypertension, hyperlipidemia. He was initially seen by PCP on 8/2023 at that time found to be in atrial fibrillation. EKG with atrial fibrillation\" controlled rates, heart rate 106. He was started on metoprolol 25 mg daily. He also underwent an echo in September which demonstrated normal LVEF with some wall motion abnormalities, subsequent stress test (Jonatan protocol) with excellent METS, stopped for target heart rate, and 9 minutes, no evidence of ischemia but had mildly reduced LVEF of 40 to 45%, referred to general cardiology. He was seen by the EP team 10/19/2023 and was referred for a sleep study and had 2 week monitor placed. Today he states he has been feeling well overall. He occasionally has palpitations that feels like his heart is racing, coming on every 3 days lasting for a few seconds and resolves on its own. No associated symptoms. Unchanged for months. He just mailed back his cardiac event monitor that he worse for 2 weeks. States he had symptoms while wearing his monitor. He stays active with a physical job which requires him to be up an down ladders and stairs. No chest pain or shortness of breath with rest or exertion. No functional limitations. He is tolerating his medications and is taking them as prescribed. Blood pressure at home is running- 110-130/60-80's. He has some dizziness when he bends over and stands back up. He reports fatigue in the evenings after work.  Patient currently denies any weight gain, edema, chest pain, shortness of breath, and

## 2023-11-07 ENCOUNTER — OFFICE VISIT (OUTPATIENT)
Dept: CARDIOLOGY CLINIC | Age: 58
End: 2023-11-07
Payer: COMMERCIAL

## 2023-11-07 VITALS
HEART RATE: 93 BPM | SYSTOLIC BLOOD PRESSURE: 106 MMHG | WEIGHT: 247 LBS | BODY MASS INDEX: 32.74 KG/M2 | OXYGEN SATURATION: 97 % | DIASTOLIC BLOOD PRESSURE: 70 MMHG | HEIGHT: 73 IN

## 2023-11-07 DIAGNOSIS — E78.2 MIXED HYPERLIPIDEMIA: ICD-10-CM

## 2023-11-07 DIAGNOSIS — I42.8 NON-ISCHEMIC CARDIOMYOPATHY (HCC): ICD-10-CM

## 2023-11-07 DIAGNOSIS — I48.91 NEW ONSET A-FIB (HCC): ICD-10-CM

## 2023-11-07 DIAGNOSIS — I10 ESSENTIAL HYPERTENSION: Primary | ICD-10-CM

## 2023-11-07 PROBLEM — I48.0 PAF (PAROXYSMAL ATRIAL FIBRILLATION) (HCC): Status: ACTIVE | Noted: 2023-09-14

## 2023-11-07 PROCEDURE — 3074F SYST BP LT 130 MM HG: CPT | Performed by: INTERNAL MEDICINE

## 2023-11-07 PROCEDURE — 99204 OFFICE O/P NEW MOD 45 MIN: CPT | Performed by: INTERNAL MEDICINE

## 2023-11-07 PROCEDURE — G8417 CALC BMI ABV UP PARAM F/U: HCPCS | Performed by: INTERNAL MEDICINE

## 2023-11-07 PROCEDURE — G8427 DOCREV CUR MEDS BY ELIG CLIN: HCPCS | Performed by: INTERNAL MEDICINE

## 2023-11-07 PROCEDURE — 3078F DIAST BP <80 MM HG: CPT | Performed by: INTERNAL MEDICINE

## 2023-11-07 PROCEDURE — G8484 FLU IMMUNIZE NO ADMIN: HCPCS | Performed by: INTERNAL MEDICINE

## 2023-11-07 NOTE — PATIENT INSTRUCTIONS
Plan:  ~Repeat echocardiogram in 6 months   ~Discussed a cardiac MRI to confirm heart function- we will hold off for now and see what echocardiogram shows. If EF remains low, will consider cardiac MRI   ~Sleep study as scheduled   Cardiac medications reviewed including indications and pertinent side effects. Medication list updated at this visit. Check blood pressure and heart rate at home a few times per week- keep a log with dates and times and bring to office visit   Regular exercise and following a healthy diet encouraged   Follow up with me in 6 months     Your provider has ordered testing for further evaluation. An order/prescription has been included in your paper work. To schedule outpatient testing, contact Central Scheduling by calling [address] [place], [place] [address] ([phone]).

## 2023-11-20 RX ORDER — LISINOPRIL AND HYDROCHLOROTHIAZIDE 25; 20 MG/1; MG/1
1 TABLET ORAL DAILY
Qty: 90 TABLET | Refills: 1 | OUTPATIENT
Start: 2023-11-20

## 2023-11-20 NOTE — TELEPHONE ENCOUNTER
Refill Request     CONFIRM preferred pharmacy with the patient.    If Mail Order Rx - Pend for 90 day refill.      Last Seen: Last Seen Department: 8/23/2023  Last Seen by PCP: 8/23/2023    Last Written: 08/23/23 90 with 1 refill    If no future appointment scheduled:  Review the last OV with PCP and review information for follow-up visit,  Route STAFF MESSAGE with patient name to the  Pool for scheduling with the following information:            -  Timing of next visit           -  Visit type ie Physical, OV, etc           -  Diagnoses/Reason ie. COPD, HTN - Do not use MEDICATION, Follow-up or CHECK UP - Give reason for visit      Next Appointment:   Future Appointments   Date Time Provider Department Center   1/17/2024  8:20 AM Gerardo Rossi MD [facility]       Message sent to  to schedule appt with patient?  NO      Requested Prescriptions     Pending Prescriptions Disp Refills    lisinopril-hydroCHLOROthiazide (PRINZIDE;ZESTORETIC) 20-25 MG per tablet [Pharmacy Med Name: LISINOPRIL-HCTZ 20-25 MG TAB] 90 tablet 1     Sig: TAKE ONE TABLET BY MOUTH DAILY

## 2023-11-21 DIAGNOSIS — I48.0 PAF (PAROXYSMAL ATRIAL FIBRILLATION) (HCC): Primary | ICD-10-CM

## 2024-01-17 ENCOUNTER — OFFICE VISIT (OUTPATIENT)
Dept: PULMONOLOGY | Age: 59
End: 2024-01-17
Payer: COMMERCIAL

## 2024-01-17 VITALS
HEART RATE: 82 BPM | WEIGHT: 252 LBS | OXYGEN SATURATION: 99 % | BODY MASS INDEX: 33.4 KG/M2 | DIASTOLIC BLOOD PRESSURE: 83 MMHG | HEIGHT: 73 IN | TEMPERATURE: 97.5 F | RESPIRATION RATE: 16 BRPM | SYSTOLIC BLOOD PRESSURE: 120 MMHG

## 2024-01-17 DIAGNOSIS — E04.2 MULTIPLE THYROID NODULES: ICD-10-CM

## 2024-01-17 DIAGNOSIS — G47.30 OBSERVED SLEEP APNEA: Primary | ICD-10-CM

## 2024-01-17 DIAGNOSIS — I42.8 NON-ISCHEMIC CARDIOMYOPATHY (HCC): ICD-10-CM

## 2024-01-17 DIAGNOSIS — I48.0 PAF (PAROXYSMAL ATRIAL FIBRILLATION) (HCC): ICD-10-CM

## 2024-01-17 DIAGNOSIS — E66.09 CLASS 1 OBESITY DUE TO EXCESS CALORIES WITH BODY MASS INDEX (BMI) OF 33.0 TO 33.9 IN ADULT, UNSPECIFIED WHETHER SERIOUS COMORBIDITY PRESENT: ICD-10-CM

## 2024-01-17 PROBLEM — E66.811 CLASS 1 OBESITY DUE TO EXCESS CALORIES WITH BODY MASS INDEX (BMI) OF 33.0 TO 33.9 IN ADULT: Status: ACTIVE | Noted: 2024-01-17

## 2024-01-17 PROCEDURE — 3079F DIAST BP 80-89 MM HG: CPT | Performed by: INTERNAL MEDICINE

## 2024-01-17 PROCEDURE — 99243 OFF/OP CNSLTJ NEW/EST LOW 30: CPT | Performed by: INTERNAL MEDICINE

## 2024-01-17 PROCEDURE — 3074F SYST BP LT 130 MM HG: CPT | Performed by: INTERNAL MEDICINE

## 2024-01-17 ASSESSMENT — SLEEP AND FATIGUE QUESTIONNAIRES
HOW LIKELY ARE YOU TO NOD OFF OR FALL ASLEEP WHILE SITTING AND READING: 3
HOW LIKELY ARE YOU TO NOD OFF OR FALL ASLEEP WHILE SITTING AND TALKING TO SOMEONE: 1
NECK CIRCUMFERENCE (INCHES): 17
HOW LIKELY ARE YOU TO NOD OFF OR FALL ASLEEP WHILE SITTING QUIETLY AFTER LUNCH WITHOUT ALCOHOL: 1
HOW LIKELY ARE YOU TO NOD OFF OR FALL ASLEEP WHILE LYING DOWN TO REST IN THE AFTERNOON WHEN CIRCUMSTANCES PERMIT: 1
HOW LIKELY ARE YOU TO NOD OFF OR FALL ASLEEP WHILE SITTING INACTIVE IN A PUBLIC PLACE: 1
HOW LIKELY ARE YOU TO NOD OFF OR FALL ASLEEP WHEN YOU ARE A PASSENGER IN A CAR FOR AN HOUR WITHOUT A BREAK: 2
ESS TOTAL SCORE: 13
HOW LIKELY ARE YOU TO NOD OFF OR FALL ASLEEP WHILE WATCHING TV: 3
HOW LIKELY ARE YOU TO NOD OFF OR FALL ASLEEP IN A CAR, WHILE STOPPED FOR A FEW MINUTES IN TRAFFIC: 1

## 2024-01-17 NOTE — PATIENT INSTRUCTIONS
Remember to bring a list of pulmonary medications and any CPAP or BiPAP machines to your next appointment with the office.     Please keep all of your future appointments scheduled by [facility] office. Out of respect for other patients and providers, you may be asked to reschedule your appointment if you arrive later than your scheduled appointment time. Appointments cancelled less than 24hrs in advance will be considered a no show. Patients with three missed appointments within 1 year or four missed appointments within 2 years can be dismissed from the practice.     Please be aware that our physicians are required to work in the Intensive Care Unit at [facility].  Your appointment may need to be rescheduled if they are designated to work during your appointment time.      You may receive a survey regarding the care you received during your visit.  Your input is valuable to us.  We encourage you to complete and return your survey.  We hope you will choose us in the future for your healthcare needs.     Pt instructed of all future appointment dates & times, including radiology, labs, procedures & referrals. If procedures were scheduled preparation instructions provided. Instructions on future appointments with [facility] were given.      Your home sleep test is scheduled to be picked up at the Sleep center located at [facility].     Address to Sleep Center:  [facility] [address], [address], [place] [address]            Phone: [phone] Fax: [phone]    If you should need to cancel or reschedule your appointment, please call the Sleep Center at [phone] as soon as possible.     We ask that you please phone the [facility] Patient Pre-Services ([phone]) at least 3-5 days prior to your sleep study to pre-register. Failing to pre-register may ultimately cause your insurance to not pay for this procedure.

## 2024-01-17 NOTE — PROGRESS NOTES
MA Communication:  The following orders are received by verbal communication from Gerardo Rossi MD    Orders include:  Home sleep study and follow up in 4 weeks    
of dozing  As a passenger in a car for an hour without a break: Moderate chance of dozing  Lying down to rest in the afternoon when circumstances permit: Slight chance of dozing  Sitting and talking to someone: Slight chance of dozing  Sitting quietly after a lunch without alcohol: Slight chance of dozing  In a car, while stopped for a few minutes in traffic: Slight chance of dozing  [place] Sleepiness Score: 13  Neck circumference (Inches): 17    Data:     Imaging:  I have reviewed radiology images personally.  No orders to display       Echocardiogram 9/12/2023  Summary   Definity® used for myocardial border enhancement.   Low normal left ventricular systolic function with estimated ejection   fraction of 50-55%.   There is hypokinesis of the apical anterior and apical septal walls.   The left ventricle is normal in size with normal wall thickness.   Indeterminate diastolic function. Patient was in atrial fibrillation during   study.   There is no evidence of a left ventricular thrombus.   Normal RV size and systolic function.   The right atrium is mildly dilated.   Trivial mitral regurgitation.     Stress test 10/5/2023   Summary   Excellent functional capacity at 10 METs   Mild to moderately reduced LVEF 46% (EF may be underestimated on this study)   Global hypokinesis   Diaphragmatic attenuation artifact   No ischemia   Findings are suggestive of nonischemic cardiomyopathy   Consider echo/cardiac MRI to further assess              [id number] Score for Atrial Fibrillation Stroke Risk    Risk   Factors   Component Value   C CHF No 0   H HTN Yes 1   A2 Age >= 75 No,  (57 y.o.) 0   D DM No 0   S2 Prior Stroke/TIA No 0   V Vascular Disease No 0   A Age 65-74 No,  (57 y.o.) 0   Sc Sex male 0     [id number]  Score   1     THYROID ULTRASOUND     9/19/2022     COMPARISON:  08/06/2021     Thyroid FNA, 08/18/2021.  Right lobe.     HISTORY:  ORDERING SYSTEM PROVIDED HISTORY: Thyroid nodule  TECHNOLOGIST PROVIDED

## 2024-01-22 ENCOUNTER — HOSPITAL ENCOUNTER (OUTPATIENT)
Dept: SLEEP CENTER | Age: 59
Discharge: HOME | End: 2024-01-24
Payer: COMMERCIAL

## 2024-01-22 DIAGNOSIS — G47.30 OBSERVED SLEEP APNEA: ICD-10-CM

## 2024-01-22 PROCEDURE — 95806 SLEEP STUDY UNATT&RESP EFFT: CPT

## 2024-01-25 ENCOUNTER — OFFICE VISIT (OUTPATIENT)
Dept: PULMONOLOGY | Age: 59
End: 2024-01-25
Payer: COMMERCIAL

## 2024-01-25 VITALS
HEART RATE: 138 BPM | DIASTOLIC BLOOD PRESSURE: 96 MMHG | OXYGEN SATURATION: 98 % | TEMPERATURE: 98 F | BODY MASS INDEX: 33.4 KG/M2 | HEIGHT: 73 IN | SYSTOLIC BLOOD PRESSURE: 132 MMHG | WEIGHT: 252 LBS

## 2024-01-25 DIAGNOSIS — G47.34 NOCTURNAL HYPOXEMIA: ICD-10-CM

## 2024-01-25 DIAGNOSIS — I48.0 PAF (PAROXYSMAL ATRIAL FIBRILLATION) (HCC): ICD-10-CM

## 2024-01-25 DIAGNOSIS — E04.2 MULTIPLE THYROID NODULES: ICD-10-CM

## 2024-01-25 DIAGNOSIS — E66.09 CLASS 1 OBESITY DUE TO EXCESS CALORIES WITH BODY MASS INDEX (BMI) OF 33.0 TO 33.9 IN ADULT, UNSPECIFIED WHETHER SERIOUS COMORBIDITY PRESENT: ICD-10-CM

## 2024-01-25 DIAGNOSIS — G47.33 OSA (OBSTRUCTIVE SLEEP APNEA): Primary | ICD-10-CM

## 2024-01-25 DIAGNOSIS — I10 ELEVATED BLOOD PRESSURE READING WITH DIAGNOSIS OF HYPERTENSION: ICD-10-CM

## 2024-01-25 PROCEDURE — 3075F SYST BP GE 130 - 139MM HG: CPT | Performed by: INTERNAL MEDICINE

## 2024-01-25 PROCEDURE — 99214 OFFICE O/P EST MOD 30 MIN: CPT | Performed by: INTERNAL MEDICINE

## 2024-01-25 PROCEDURE — 3080F DIAST BP >= 90 MM HG: CPT | Performed by: INTERNAL MEDICINE

## 2024-01-25 NOTE — PATIENT INSTRUCTIONS
Remember to bring a list of pulmonary medications and any CPAP or BiPAP machines to your next appointment with the office.     Please keep all of your future appointments scheduled by [facility] office. Out of respect for other patients and providers, you may be asked to reschedule your appointment if you arrive later than your scheduled appointment time. Appointments cancelled less than 24hrs in advance will be considered a no show. Patients with three missed appointments within 1 year or four missed appointments within 2 years can be dismissed from the practice.     Please be aware that our physicians are required to work in the Intensive Care Unit at [facility].  Your appointment may need to be rescheduled if they are designated to work during your appointment time.      You may receive a survey regarding the care you received during your visit.  Your input is valuable to us.  We encourage you to complete and return your survey.  We hope you will choose us in the future for your healthcare needs.     Pt instructed of all future appointment dates & times, including radiology, labs, procedures & referrals. If procedures were scheduled preparation instructions provided. Instructions on future appointments with [facility] were given.

## 2024-01-25 NOTE — PROGRESS NOTES
MA Communication:  The following orders are received by verbal communication from Gerardo Rossi MD    Orders include:    31-90 days   
via [employer].  Access your [employer] account by  visiting [employer]  Reason for exam:->thyroid nodule     FINDINGS:  Right thyroid lobe:  5.9 x 2.3 x 2.5 cm     Left thyroid lobe:  6.1 x 1.7 x 2.1 cm     Isthmus:  0.4 cm     Thyroid Gland: Thyroid gland is homogeneous with normal vascularity.  In the  left lobe, there is a stable 11 mm spongiform nodule, TR 1; no follow-up is  recommended.  Additional nodule is noted in the right lobe, further described  below-     NODULE: Right 1     Size: 25 x 10 x 25 mm (previously 31 x 19 x 14 mm)     Location: Mid     1.  Composition:  Mixed cystic and solid (1)     2.  Echogenicity:  Hypoechoic (2)     3.  Shape: Wider than tall (0)     4.  Margins: (Margins no longer considered lobular).  Ill-defined (0)     5. Echogenic foci:  None (0)     ACR TI-RADS total points:  3     ACR TI-RADS risk category: TR 3     Decreased in size.  Morphology otherwise not significantly changed.     FNA 08/18/2021, benign cystic nodule.     Cervical lymphadenopathy: No abnormal lymph nodes in the imaged portions of  the neck.     IMPRESSION:  Right 1, 25 mm TR 3 nodule, decreased in size since 08/06/2021 and with  benign cytology.  12 month follow-up at imaging is recommended.      Patient's sleep rate shows patient to have severe ANSHU with AHI of 54.9/h along with that patient also has had nocturnal hypoxemia with saturation dropping to as low as 76% at nighttime with saturation below 89% for 42 minutes on the sleep study results      Assessment:    1.  Obstructive sleep apnea        2. PAF (paroxysmal atrial fibrillation) (HCC)      3.  Elevated blood pressure reading      4. Class 1 obesity due to excess calories with body mass index (BMI) of 33.0 to 33.9 in adult, unspecified whether serious comorbidity present      5. Multiple thyroid nodules    6.  Nocturnal hypoxemia          Plan:   Patient's review of system were discussed  Patient was told about the clinical finding including

## 2024-01-26 ENCOUNTER — TELEPHONE (OUTPATIENT)
Dept: PULMONOLOGY | Age: 59
End: 2024-01-26

## 2024-01-26 NOTE — TELEPHONE ENCOUNTER
Pt called to let us know he has chosen [facility] for his CPAP. Please send order over, thanks!

## 2024-02-02 DIAGNOSIS — G47.33 OSA (OBSTRUCTIVE SLEEP APNEA): Primary | ICD-10-CM

## 2024-02-15 ENCOUNTER — HOSPITAL ENCOUNTER (OUTPATIENT)
Dept: GENERAL RADIOLOGY | Age: 59
Discharge: HOME | End: 2024-02-15
Payer: COMMERCIAL

## 2024-02-15 ENCOUNTER — OFFICE VISIT (OUTPATIENT)
Dept: CARDIOLOGY CLINIC | Age: 59
End: 2024-02-15

## 2024-02-15 ENCOUNTER — OFFICE VISIT (OUTPATIENT)
Dept: FAMILY MEDICINE CLINIC | Age: 59
End: 2024-02-15
Payer: COMMERCIAL

## 2024-02-15 VITALS
SYSTOLIC BLOOD PRESSURE: 104 MMHG | DIASTOLIC BLOOD PRESSURE: 76 MMHG | WEIGHT: 254.6 LBS | OXYGEN SATURATION: 94 % | HEIGHT: 73 IN | BODY MASS INDEX: 33.74 KG/M2 | HEART RATE: 156 BPM

## 2024-02-15 VITALS
OXYGEN SATURATION: 99 % | BODY MASS INDEX: 33.51 KG/M2 | DIASTOLIC BLOOD PRESSURE: 70 MMHG | HEART RATE: 76 BPM | SYSTOLIC BLOOD PRESSURE: 114 MMHG | WEIGHT: 254 LBS

## 2024-02-15 DIAGNOSIS — R05.3 CHRONIC COUGH: ICD-10-CM

## 2024-02-15 DIAGNOSIS — E78.2 MIXED HYPERLIPIDEMIA: ICD-10-CM

## 2024-02-15 DIAGNOSIS — J32.9 CHRONIC SINUSITIS, UNSPECIFIED LOCATION: Primary | ICD-10-CM

## 2024-02-15 DIAGNOSIS — I48.91 NEW ONSET A-FIB (HCC): ICD-10-CM

## 2024-02-15 DIAGNOSIS — I48.0 PAROXYSMAL ATRIAL FIBRILLATION (HCC): Primary | ICD-10-CM

## 2024-02-15 DIAGNOSIS — I10 ESSENTIAL HYPERTENSION: ICD-10-CM

## 2024-02-15 PROCEDURE — 3074F SYST BP LT 130 MM HG: CPT | Performed by: PHYSICIAN ASSISTANT

## 2024-02-15 PROCEDURE — 71046 X-RAY EXAM CHEST 2 VIEWS: CPT

## 2024-02-15 PROCEDURE — 3078F DIAST BP <80 MM HG: CPT | Performed by: PHYSICIAN ASSISTANT

## 2024-02-15 PROCEDURE — 99213 OFFICE O/P EST LOW 20 MIN: CPT | Performed by: PHYSICIAN ASSISTANT

## 2024-02-15 RX ORDER — METHYLPREDNISOLONE 4 MG/1
TABLET ORAL
Qty: 1 KIT | Refills: 0 | Status: SHIPPED | OUTPATIENT
Start: 2024-02-15

## 2024-02-15 RX ORDER — METOPROLOL SUCCINATE 25 MG/1
25 TABLET, EXTENDED RELEASE ORAL DAILY
Qty: 90 TABLET | Refills: 1 | Status: SHIPPED | OUTPATIENT
Start: 2024-02-15

## 2024-02-15 RX ORDER — ATORVASTATIN CALCIUM 20 MG/1
20 TABLET, FILM COATED ORAL DAILY
Qty: 90 TABLET | Refills: 1 | Status: SHIPPED | OUTPATIENT
Start: 2024-02-15

## 2024-02-15 RX ORDER — LISINOPRIL AND HYDROCHLOROTHIAZIDE 12.5; 1 MG/1; MG/1
1 TABLET ORAL DAILY
Qty: 90 TABLET | Refills: 1 | Status: SHIPPED | OUTPATIENT
Start: 2024-02-15

## 2024-02-15 RX ORDER — ASPIRIN 81 MG/1
81 TABLET ORAL DAILY
Qty: 90 TABLET | Refills: 1 | Status: SHIPPED | OUTPATIENT
Start: 2024-02-15

## 2024-02-15 RX ORDER — FLUTICASONE PROPIONATE 50 MCG
1 SPRAY, SUSPENSION (ML) NASAL DAILY
Qty: 16 G | Refills: 0 | Status: SHIPPED | OUTPATIENT
Start: 2024-02-15

## 2024-02-15 NOTE — PROGRESS NOTES
[facility]   Electrophysiology follow up    Date: 2/15/2024    CC: atrial fibrillation     HPI: Weston Edmond is a 58 y.o. male history of hypertension and hyperlipidemia.  He was initially seen by PCP on 8/2023 at that time found to be in atrial fibrillation.  EKG with atrial fibrillation\" controlled rates, heart rate 106.  He was started on metoprolol 25 mg daily.  He also underwent an echo in September which demonstrated normal LVEF with some wall motion abnormalities, subsequent stress test (Jonatan protocol) with excellent METS, stopped for target heart rate, 9 minutes, no evidence of ischemia but had mildly reduced LVEF of 40 to 45%.  Was initially seen in October as a new patient for evaluation and management of atrial fibrillation, occasional palpitations, biweekly, fatigue, cardiac monitor 10/19 through 11/2 demonstrated 53% AF burden with an average heart rate of 111 (), also recommended sleep referral.    Patient presents today as follow-up for evaluation and management of atrial fibrillation and to discuss cardiac monitor.  He has been feeling more fatigued recently, EKG today with poorly controlled AF rates (127).  Recently diagnosed with obstructive sleep apnea and waiting on CPAP.  Denies racing heart.    Review of System:  [x] Full ROS obtained and negative except as mentioned in HPI or below      Prior to Admission medications    Medication Sig Start Date End Date Taking? Authorizing Provider   metoprolol succinate (TOPROL XL) 25 MG extended release tablet TAKE 1 TABLET BY MOUTH DAILY 2/15/24  Yes Mia Alberto PA   ASPIRIN ADULT LOW STRENGTH 81 MG EC tablet TAKE 1 TABLET BY MOUTH DAILY 2/15/24  Yes Mia Alberto PA   atorvastatin (LIPITOR) 20 MG tablet Take 1 tablet by mouth daily 2/15/24  Yes Mia Alberto PA   lisinopril-hydroCHLOROthiazide (PRINZIDE;ZESTORETIC) 10-12.5 MG per tablet Take 1 tablet by mouth daily 2/15/24  Yes Mia Alberto PA   Multiple

## 2024-02-15 NOTE — PROGRESS NOTES
Weston Edmond (:  1965) is a 58 y.o. male,Established patient, here for evaluation of the following chief complaint(s):  Cough (Marcie after he eats, states this has been going on for a long time, not sure if its from lisinopril ) and Shortness of Breath (Just diagnosed with A fib, not sure if its from that )         ASSESSMENT/PLAN:  1. Chronic sinusitis, unspecified location  -     methylPREDNISolone (MEDROL DOSEPACK) 4 MG tablet; Take by mouth., Disp-1 kit, R-0Normal  -     fluticasone (FLONASE) 50 MCG/ACT nasal spray; 1 spray by Each Nostril route daily, Disp-16 g, R-0Normal  -   will trial nasal spray and steroid  2. Chronic cough  -     XR CHEST STANDARD (2 VW); Future: negative for mass or infection, possibly post nasal drip vs gerd. Other consideration would be lisinopril. Will follow up with him in two weeks  3. Mixed hyperlipidemia  -     atorvastatin (LIPITOR) 20 MG tablet; Take 1 tablet by mouth daily, Disp-90 tablet, R-1Normal  4. Essential hypertension  -     lisinopril-hydroCHLOROthiazide (PRINZIDE;ZESTORETIC) 10-12.5 MG per tablet; Take 1 tablet by mouth daily, Disp-90 tablet, R-1Normal      Subjective   SUBJECTIVE/OBJECTIVE:  HPI  Cough:  Timing: years, worse in the last week  Current symptoms: Cough is productive of clear mucous, rhinorrhea feeling short of breath easier than normal (also just diagnosed with PAF), congestion  Denies: fever, headache, ear pain, sore throat  Tx: nothing    Review of Systems   Constitutional:  Negative for chills and fever.   HENT:  Positive for congestion. Negative for sore throat.    Respiratory:  Positive for cough and shortness of breath. Negative for chest tightness.           Objective   Physical Exam  Vitals reviewed.   Constitutional:       Appearance: Normal appearance.   HENT:      Head: Normocephalic and atraumatic.      Right Ear: Tympanic membrane, ear canal and external ear normal.      Left Ear: Tympanic membrane, ear canal and external ear

## 2024-02-15 NOTE — TELEPHONE ENCOUNTER
.Refill Request     CONFIRM preferred pharmacy with the patient.    If Mail Order Rx - Pend for 90 day refill.      Last Seen: Last Seen Department: 8/23/2023  Last Seen by PCP: 8/23/2023    Last Written: 8-23-23 90 with 1     If no future appointment scheduled:  Review the last OV with PCP and review information for follow-up visit,  Route STAFF MESSAGE with patient name to the  Pool for scheduling with the following information:            -  Timing of next visit           -  Visit type ie Physical, OV, etc           -  Diagnoses/Reason ie. COPD, HTN - Do not use MEDICATION, Follow-up or CHECK UP - Give reason for visit      Next Appointment:   Future Appointments   Date Time Provider Department Center   2/15/2024 10:30 AM [facility]   2/15/2024  2:30 PM Armando Barakat MD [facility]   3/22/2024  8:20 AM Gerardo Rossi MD [facility]       Message sent to  to schedule appt with patient?  N/A      Requested Prescriptions     Pending Prescriptions Disp Refills    metoprolol succinate (TOPROL XL) 25 MG extended release tablet [Pharmacy Med Name: METOPROLOL SUCC ER 25 MG TAB] 90 tablet 1     Sig: TAKE 1 TABLET BY MOUTH DAILY    ASPIRIN ADULT LOW STRENGTH 81 MG EC tablet [Pharmacy Med Name: ASPIRIN EC 81 MG TABLET] 90 tablet 1     Sig: TAKE 1 TABLET BY MOUTH DAILY

## 2024-02-16 ENCOUNTER — PATIENT MESSAGE (OUTPATIENT)
Dept: CARDIOLOGY CLINIC | Age: 59
End: 2024-02-16

## 2024-02-19 NOTE — TELEPHONE ENCOUNTER
From: Weston Edmond  To: Dr. Armando Barakat  Sent: 2/16/2024 1:50 PM EST  Subject: A-fib options    Hi Dr Barakat, Thanks for explaining and taking the time with me yesterday. I would like to go with the ablation. Can we schedule the procedure for a Friday? Thanks Hardik

## 2024-02-23 NOTE — TELEPHONE ENCOUNTER
Procedure:  Afib Ablation  Doctor:  Dr. Barakat  Date:  3/15/24  Time:  8am  Arrival:  6:30am  Reps:  Cart[place]  Anesthesia:  Yes      Spoke with patient. Please have patient arrive to the main entrance of [facility] ([address] [place] [id number]) and check in with the registration desk.  They will be directed to the Cath Lab.  Please call patient regarding medication instructions. Remind patient to be NPO after midnight (8 hours prior). Do not apply lotions/creams on skin the day of procedure.

## 2024-02-27 ENCOUNTER — PATIENT MESSAGE (OUTPATIENT)
Dept: FAMILY MEDICINE CLINIC | Age: 59
End: 2024-02-27

## 2024-02-28 RX ORDER — OMEPRAZOLE 20 MG/1
20 CAPSULE, DELAYED RELEASE ORAL
Qty: 90 CAPSULE | Refills: 1 | Status: ON HOLD | OUTPATIENT
Start: 2024-02-28

## 2024-02-28 RX ORDER — AMOXICILLIN 875 MG/1
875 TABLET, COATED ORAL 2 TIMES DAILY
Qty: 20 TABLET | Refills: 0 | Status: ON HOLD | OUTPATIENT
Start: 2024-02-28 | End: 2024-03-09

## 2024-02-28 NOTE — TELEPHONE ENCOUNTER
From: Weston Edmond  To: Mia Alberto  Sent: 2/27/2024 7:33 PM EST  Subject: cough    Hi Mia, after finishing the steroids I still have the cough, It seems to worse after I eat, do you think we could try a antibiotic? I am scheduled for the ablation on the 15th of March for the a-fib. Thoughts? Thanks Hardik

## 2024-03-01 ENCOUNTER — TELEPHONE (OUTPATIENT)
Dept: PULMONOLOGY | Age: 59
End: 2024-03-01

## 2024-03-01 DIAGNOSIS — G47.33 OSA (OBSTRUCTIVE SLEEP APNEA): Primary | ICD-10-CM

## 2024-03-01 RX ORDER — SODIUM CHLORIDE 0.9 % (FLUSH) 0.9 %
5-40 SYRINGE (ML) INJECTION PRN
OUTPATIENT
Start: 2024-03-01

## 2024-03-01 RX ORDER — ONDANSETRON 2 MG/ML
4 INJECTION INTRAMUSCULAR; INTRAVENOUS EVERY 6 HOURS PRN
OUTPATIENT
Start: 2024-03-01

## 2024-03-01 RX ORDER — LORAZEPAM 0.5 MG/1
0.5 TABLET ORAL
OUTPATIENT
Start: 2024-03-01 | End: 2024-03-02

## 2024-03-01 RX ORDER — SODIUM CHLORIDE 9 MG/ML
INJECTION, SOLUTION INTRAVENOUS PRN
OUTPATIENT
Start: 2024-03-01

## 2024-03-01 RX ORDER — SODIUM CHLORIDE 0.9 % (FLUSH) 0.9 %
5-40 SYRINGE (ML) INJECTION EVERY 12 HOURS SCHEDULED
OUTPATIENT
Start: 2024-03-01

## 2024-03-01 NOTE — TELEPHONE ENCOUNTER
Pt reports he has been using his cpap for 2 weeks and is experiencing bloating and \"belly full of air.\" Pt wants to know if pressure on his cpap can be lowered?

## 2024-03-03 ENCOUNTER — APPOINTMENT (OUTPATIENT)
Dept: GENERAL RADIOLOGY | Age: 59
DRG: 321 | End: 2024-03-03
Payer: COMMERCIAL

## 2024-03-03 ENCOUNTER — HOSPITAL ENCOUNTER (INPATIENT)
Age: 59
LOS: 8 days | Discharge: HOME | DRG: 321 | End: 2024-03-11
Attending: STUDENT IN AN ORGANIZED HEALTH CARE EDUCATION/TRAINING PROGRAM | Admitting: HOSPITALIST
Payer: COMMERCIAL

## 2024-03-03 DIAGNOSIS — R74.01 TRANSAMINITIS: ICD-10-CM

## 2024-03-03 DIAGNOSIS — R60.0 BILATERAL LOWER EXTREMITY EDEMA: ICD-10-CM

## 2024-03-03 DIAGNOSIS — I51.7 CARDIOMEGALY: ICD-10-CM

## 2024-03-03 DIAGNOSIS — I48.91 ATRIAL FIBRILLATION WITH RVR (HCC): Primary | ICD-10-CM

## 2024-03-03 LAB
ALBUMIN SERPL-MCNC: 3.6 G/DL (ref 3.4–5)
ALBUMIN/GLOB SERPL: 1.5 {RATIO} (ref 1.1–2.2)
ALP SERPL-CCNC: 152 U/L (ref 40–129)
ALT SERPL-CCNC: 299 U/L (ref 10–40)
ANION GAP SERPL CALCULATED.3IONS-SCNC: 14 MMOL/L (ref 3–16)
AST SERPL-CCNC: 186 U/L (ref 15–37)
BASOPHILS # BLD: 0.1 K/UL (ref 0–0.2)
BASOPHILS NFR BLD: 0.6 %
BILIRUB SERPL-MCNC: 1.2 MG/DL (ref 0–1)
BUN SERPL-MCNC: 21 MG/DL (ref 7–20)
CALCIUM SERPL-MCNC: 8.6 MG/DL (ref 8.3–10.6)
CHLORIDE SERPL-SCNC: 103 MMOL/L (ref 99–110)
CO2 SERPL-SCNC: 20 MMOL/L (ref 21–32)
CREAT SERPL-MCNC: 1.1 MG/DL (ref 0.9–1.3)
DEPRECATED RDW RBC AUTO: 14.8 % (ref 12.4–15.4)
EOSINOPHIL # BLD: 0.1 K/UL (ref 0–0.6)
EOSINOPHIL NFR BLD: 0.5 %
GFR SERPLBLD CREATININE-BSD FMLA CKD-EPI: >60 ML/MIN/{1.73_M2}
GLUCOSE SERPL-MCNC: 122 MG/DL (ref 70–99)
HCT VFR BLD AUTO: 47.1 % (ref 40.5–52.5)
HGB BLD-MCNC: 15.3 G/DL (ref 13.5–17.5)
LYMPHOCYTES # BLD: 2.9 K/UL (ref 1–5.1)
LYMPHOCYTES NFR BLD: 22.8 %
MCH RBC QN AUTO: 30.2 PG (ref 26–34)
MCHC RBC AUTO-ENTMCNC: 32.5 G/DL (ref 31–36)
MCV RBC AUTO: 92.7 FL (ref 80–100)
MONOCYTES # BLD: 1.2 K/UL (ref 0–1.3)
MONOCYTES NFR BLD: 9.5 %
NEUTROPHILS # BLD: 8.6 K/UL (ref 1.7–7.7)
NEUTROPHILS NFR BLD: 66.6 %
NT-PROBNP SERPL-MCNC: 6407 PG/ML (ref 0–124)
PLATELET # BLD AUTO: 258 K/UL (ref 135–450)
PMV BLD AUTO: 9.1 FL (ref 5–10.5)
POTASSIUM SERPL-SCNC: 4.1 MMOL/L (ref 3.5–5.1)
PROT SERPL-MCNC: 6 G/DL (ref 6.4–8.2)
RBC # BLD AUTO: 5.08 M/UL (ref 4.2–5.9)
SODIUM SERPL-SCNC: 137 MMOL/L (ref 136–145)
TROPONIN, HIGH SENSITIVITY: 20 NG/L (ref 0–22)
WBC # BLD AUTO: 12.9 K/UL (ref 4–11)

## 2024-03-03 PROCEDURE — 36415 COLL VENOUS BLD VENIPUNCTURE: CPT

## 2024-03-03 PROCEDURE — 96365 THER/PROPH/DIAG IV INF INIT: CPT

## 2024-03-03 PROCEDURE — 96372 THER/PROPH/DIAG INJ SC/IM: CPT

## 2024-03-03 PROCEDURE — 2580000003 HC RX 258: Performed by: PHYSICIAN ASSISTANT

## 2024-03-03 PROCEDURE — 6360000002 HC RX W HCPCS: Performed by: STUDENT IN AN ORGANIZED HEALTH CARE EDUCATION/TRAINING PROGRAM

## 2024-03-03 PROCEDURE — 71045 X-RAY EXAM CHEST 1 VIEW: CPT

## 2024-03-03 PROCEDURE — 99285 EMERGENCY DEPT VISIT HI MDM: CPT

## 2024-03-03 PROCEDURE — 83880 ASSAY OF NATRIURETIC PEPTIDE: CPT

## 2024-03-03 PROCEDURE — 96366 THER/PROPH/DIAG IV INF ADDON: CPT

## 2024-03-03 PROCEDURE — 2500000003 HC RX 250 WO HCPCS: Performed by: PHYSICIAN ASSISTANT

## 2024-03-03 PROCEDURE — 93005 ELECTROCARDIOGRAM TRACING: CPT | Performed by: STUDENT IN AN ORGANIZED HEALTH CARE EDUCATION/TRAINING PROGRAM

## 2024-03-03 PROCEDURE — 84484 ASSAY OF TROPONIN QUANT: CPT

## 2024-03-03 PROCEDURE — 85025 COMPLETE CBC W/AUTO DIFF WBC: CPT

## 2024-03-03 PROCEDURE — 80074 ACUTE HEPATITIS PANEL: CPT

## 2024-03-03 PROCEDURE — 80053 COMPREHEN METABOLIC PANEL: CPT

## 2024-03-03 PROCEDURE — 2060000000 HC ICU INTERMEDIATE R&B

## 2024-03-03 RX ORDER — DILTIAZEM HYDROCHLORIDE 5 MG/ML
10 INJECTION INTRAVENOUS ONCE
Status: COMPLETED | OUTPATIENT
Start: 2024-03-03 | End: 2024-03-03

## 2024-03-03 RX ORDER — ENOXAPARIN SODIUM 150 MG/ML
1 INJECTION SUBCUTANEOUS ONCE
Status: COMPLETED | OUTPATIENT
Start: 2024-03-03 | End: 2024-03-03

## 2024-03-03 RX ADMIN — ENOXAPARIN SODIUM 120 MG: 150 INJECTION SUBCUTANEOUS at 21:50

## 2024-03-03 RX ADMIN — DILTIAZEM HYDROCHLORIDE 10 MG: 5 INJECTION INTRAVENOUS at 21:11

## 2024-03-03 RX ADMIN — SODIUM CHLORIDE 5 MG/HR: 900 INJECTION, SOLUTION INTRAVENOUS at 21:12

## 2024-03-03 ASSESSMENT — PAIN - FUNCTIONAL ASSESSMENT: PAIN_FUNCTIONAL_ASSESSMENT: NONE - DENIES PAIN

## 2024-03-04 PROBLEM — D72.829 LEUKOCYTOSIS: Status: ACTIVE | Noted: 2024-03-04

## 2024-03-04 PROBLEM — R74.01 TRANSAMINITIS: Status: ACTIVE | Noted: 2024-03-04

## 2024-03-04 PROBLEM — R79.89 ELEVATED BRAIN NATRIURETIC PEPTIDE (BNP) LEVEL: Status: ACTIVE | Noted: 2024-03-04

## 2024-03-04 LAB
ANION GAP SERPL CALCULATED.3IONS-SCNC: 13 MMOL/L (ref 3–16)
BASOPHILS # BLD: 0.1 K/UL (ref 0–0.2)
BASOPHILS NFR BLD: 0.9 %
BUN SERPL-MCNC: 21 MG/DL (ref 7–20)
CALCIUM SERPL-MCNC: 8.7 MG/DL (ref 8.3–10.6)
CHLORIDE SERPL-SCNC: 104 MMOL/L (ref 99–110)
CO2 SERPL-SCNC: 22 MMOL/L (ref 21–32)
CREAT SERPL-MCNC: 0.9 MG/DL (ref 0.9–1.3)
DEPRECATED RDW RBC AUTO: 14.7 % (ref 12.4–15.4)
EOSINOPHIL # BLD: 0 K/UL (ref 0–0.6)
EOSINOPHIL NFR BLD: 0.5 %
GFR SERPLBLD CREATININE-BSD FMLA CKD-EPI: >60 ML/MIN/{1.73_M2}
GLUCOSE SERPL-MCNC: 98 MG/DL (ref 70–99)
HAV IGM SERPL QL IA: NORMAL
HBV CORE IGM SERPL QL IA: NORMAL
HBV SURFACE AG SERPL QL IA: NORMAL
HCT VFR BLD AUTO: 45.4 % (ref 40.5–52.5)
HCV AB SERPL QL IA: NORMAL
HGB BLD-MCNC: 14.9 G/DL (ref 13.5–17.5)
LYMPHOCYTES # BLD: 2.5 K/UL (ref 1–5.1)
LYMPHOCYTES NFR BLD: 25.9 %
MCH RBC QN AUTO: 30.4 PG (ref 26–34)
MCHC RBC AUTO-ENTMCNC: 32.8 G/DL (ref 31–36)
MCV RBC AUTO: 92.7 FL (ref 80–100)
MONOCYTES # BLD: 1 K/UL (ref 0–1.3)
MONOCYTES NFR BLD: 10.1 %
NEUTROPHILS # BLD: 6.1 K/UL (ref 1.7–7.7)
NEUTROPHILS NFR BLD: 62.6 %
PLATELET # BLD AUTO: 198 K/UL (ref 135–450)
PMV BLD AUTO: 9.1 FL (ref 5–10.5)
POTASSIUM SERPL-SCNC: 4.1 MMOL/L (ref 3.5–5.1)
RBC # BLD AUTO: 4.9 M/UL (ref 4.2–5.9)
SODIUM SERPL-SCNC: 139 MMOL/L (ref 136–145)
WBC # BLD AUTO: 9.8 K/UL (ref 4–11)

## 2024-03-04 PROCEDURE — 6360000002 HC RX W HCPCS

## 2024-03-04 PROCEDURE — 2060000000 HC ICU INTERMEDIATE R&B

## 2024-03-04 PROCEDURE — 6370000000 HC RX 637 (ALT 250 FOR IP)

## 2024-03-04 PROCEDURE — 6370000000 HC RX 637 (ALT 250 FOR IP): Performed by: INTERNAL MEDICINE

## 2024-03-04 PROCEDURE — 6360000002 HC RX W HCPCS: Performed by: NURSE PRACTITIONER

## 2024-03-04 PROCEDURE — 36415 COLL VENOUS BLD VENIPUNCTURE: CPT

## 2024-03-04 PROCEDURE — 6360000002 HC RX W HCPCS: Performed by: INTERNAL MEDICINE

## 2024-03-04 PROCEDURE — 99223 1ST HOSP IP/OBS HIGH 75: CPT | Performed by: INTERNAL MEDICINE

## 2024-03-04 PROCEDURE — 85025 COMPLETE CBC W/AUTO DIFF WBC: CPT

## 2024-03-04 PROCEDURE — 80048 BASIC METABOLIC PNL TOTAL CA: CPT

## 2024-03-04 RX ORDER — DIGOXIN 0.25 MG/ML
250 INJECTION INTRAMUSCULAR; INTRAVENOUS EVERY 6 HOURS
Status: COMPLETED | OUTPATIENT
Start: 2024-03-04 | End: 2024-03-05

## 2024-03-04 RX ORDER — FUROSEMIDE 10 MG/ML
40 INJECTION INTRAMUSCULAR; INTRAVENOUS 2 TIMES DAILY
Status: DISCONTINUED | OUTPATIENT
Start: 2024-03-04 | End: 2024-03-07

## 2024-03-04 RX ORDER — ATORVASTATIN CALCIUM 10 MG/1
20 TABLET, FILM COATED ORAL DAILY
Status: DISCONTINUED | OUTPATIENT
Start: 2024-03-04 | End: 2024-03-07

## 2024-03-04 RX ORDER — METOPROLOL SUCCINATE 25 MG/1
25 TABLET, EXTENDED RELEASE ORAL DAILY
Status: DISCONTINUED | OUTPATIENT
Start: 2024-03-04 | End: 2024-03-05

## 2024-03-04 RX ORDER — ASPIRIN 81 MG/1
81 TABLET ORAL DAILY
Status: DISCONTINUED | OUTPATIENT
Start: 2024-03-04 | End: 2024-03-11 | Stop reason: HOSPADM

## 2024-03-04 RX ORDER — ENOXAPARIN SODIUM 150 MG/ML
1 INJECTION SUBCUTANEOUS EVERY 12 HOURS
Status: DISCONTINUED | OUTPATIENT
Start: 2024-03-04 | End: 2024-03-04

## 2024-03-04 RX ORDER — POTASSIUM CHLORIDE 20 MEQ/1
20 TABLET, EXTENDED RELEASE ORAL DAILY
Status: DISCONTINUED | OUTPATIENT
Start: 2024-03-04 | End: 2024-03-06

## 2024-03-04 RX ORDER — DIGOXIN 0.25 MG/ML
250 INJECTION INTRAMUSCULAR; INTRAVENOUS ONCE
Status: COMPLETED | OUTPATIENT
Start: 2024-03-04 | End: 2024-03-04

## 2024-03-04 RX ADMIN — METOPROLOL SUCCINATE 25 MG: 25 TABLET, EXTENDED RELEASE ORAL at 09:32

## 2024-03-04 RX ADMIN — DIGOXIN 250 MCG: 0.25 INJECTION INTRAMUSCULAR; INTRAVENOUS at 16:28

## 2024-03-04 RX ADMIN — DIGOXIN 250 MCG: 0.25 INJECTION INTRAMUSCULAR; INTRAVENOUS at 23:36

## 2024-03-04 RX ADMIN — POTASSIUM CHLORIDE 20 MEQ: 1500 TABLET, EXTENDED RELEASE ORAL at 09:32

## 2024-03-04 RX ADMIN — ATORVASTATIN CALCIUM 20 MG: 10 TABLET, FILM COATED ORAL at 13:55

## 2024-03-04 RX ADMIN — ASPIRIN 81 MG: 81 TABLET, COATED ORAL at 13:55

## 2024-03-04 RX ADMIN — FUROSEMIDE 40 MG: 10 INJECTION, SOLUTION INTRAMUSCULAR; INTRAVENOUS at 18:17

## 2024-03-04 RX ADMIN — FUROSEMIDE 40 MG: 10 INJECTION, SOLUTION INTRAMUSCULAR; INTRAVENOUS at 09:32

## 2024-03-04 RX ADMIN — APIXABAN 5 MG: 5 TABLET, FILM COATED ORAL at 20:12

## 2024-03-04 RX ADMIN — DIGOXIN 250 MCG: 0.25 INJECTION INTRAMUSCULAR; INTRAVENOUS at 20:13

## 2024-03-04 ASSESSMENT — PAIN SCALES - GENERAL: PAINLEVEL_OUTOF10: 0

## 2024-03-04 NOTE — TELEPHONE ENCOUNTER
Patient currently admitted-room 202. Medications will need reviewed upon D/C to confirm hold list is correct for ablation scheduled  3/15/2024 (patient not on OAC at this time, but may be added during admission, please review). Have not spoken with patient regarding medication instructions yet.

## 2024-03-04 NOTE — ED PROVIDER NOTES
[facility]  ED  EMERGENCY DEPARTMENT ENCOUNTER        Pt Name: Weston Edmond  MRN: [id number]  Birthdate 1965  Date of evaluation: 3/3/2024  Provider: IND[place] BOX PA-C  PCP: Mia Alberto PA  ED Attending: Torrey Hall DO       I have seen and evaluated this patient with my supervising physician Torrey Hall DO.    CHIEF COMPLAINT:     Chief Complaint   Patient presents with    Shortness of Breath     Worsening since this morning. States his ankles and abdomen have been more swollen. Denies any pain. Hx of a fib. Scheduled to have an ablation 3/15    Dizziness       HISTORY OF PRESENT ILLNESS:      History provided by the patient. No limitations.    Weston Edmond is a 58 y.o. male who arrives to the ED by private vehicle.  Patient accompanied by his wife.  The patient states he has been feeling bad for 2 to 3 weeks.  He describes mild chest discomfort, progressive shortness of breath, worse with exertion.  He has lower extremity swelling/edema as well as some abdominal swelling.  He has also had a little dizziness.  He was recently diagnosed with A-fib (he tells me his diagnosis was 4 to 6 weeks ago but I see notation of new onset A-fib from 8/2023).  He states he is scheduled to undergo ablation on 3/15 by Dr. Barakat.  He is currently on Toprol but is not anticoagulated.    Nursing Notes were reviewed     REVIEW OF SYSTEMS:     Review of Systems  Positives and pertinent negatives as per HPI.      PAST MEDICAL HISTORY:     Past Medical History:   Diagnosis Date    Charcot-Liliana-Tooth disease     Class 1 obesity due to excess calories with body mass index (BMI) of 33.0 to 33.9 in adult 01/17/2024    Hyperlipemia     Hypertension     Mixed hyperlipidemia 01/15/2020    Observed sleep apnea 01/17/2024    PAF (paroxysmal atrial fibrillation) (HCC) 09/14/2023    Sleep apnea        SURGICAL HISTORY:      Past Surgical History:   Procedure Laterality Date    BREAST BIOPSY

## 2024-03-05 LAB
ALBUMIN SERPL-MCNC: 3.1 G/DL (ref 3.4–5)
ALP SERPL-CCNC: 122 U/L (ref 40–129)
ALT SERPL-CCNC: 202 U/L (ref 10–40)
ANION GAP SERPL CALCULATED.3IONS-SCNC: 11 MMOL/L (ref 3–16)
AST SERPL-CCNC: 85 U/L (ref 15–37)
BILIRUB DIRECT SERPL-MCNC: 0.4 MG/DL (ref 0–0.3)
BILIRUB INDIRECT SERPL-MCNC: 0.8 MG/DL (ref 0–1)
BILIRUB SERPL-MCNC: 1.2 MG/DL (ref 0–1)
BUN SERPL-MCNC: 20 MG/DL (ref 7–20)
CALCIUM SERPL-MCNC: 8.7 MG/DL (ref 8.3–10.6)
CHLORIDE SERPL-SCNC: 103 MMOL/L (ref 99–110)
CO2 SERPL-SCNC: 26 MMOL/L (ref 21–32)
CREAT SERPL-MCNC: 1 MG/DL (ref 0.9–1.3)
EKG ATRIAL RATE: 156 BPM
EKG DIAGNOSIS: NORMAL
EKG Q-T INTERVAL: 322 MS
EKG QRS DURATION: 94 MS
EKG QTC CALCULATION (BAZETT): 514 MS
EKG R AXIS: 26 DEGREES
EKG T AXIS: 20 DEGREES
EKG VENTRICULAR RATE: 153 BPM
GFR SERPLBLD CREATININE-BSD FMLA CKD-EPI: >60 ML/MIN/{1.73_M2}
GLUCOSE SERPL-MCNC: 87 MG/DL (ref 70–99)
MAGNESIUM SERPL-MCNC: 2.2 MG/DL (ref 1.8–2.4)
POTASSIUM SERPL-SCNC: 3.9 MMOL/L (ref 3.5–5.1)
PROT SERPL-MCNC: 5.5 G/DL (ref 6.4–8.2)
SODIUM SERPL-SCNC: 140 MMOL/L (ref 136–145)

## 2024-03-05 PROCEDURE — 2060000000 HC ICU INTERMEDIATE R&B

## 2024-03-05 PROCEDURE — 80048 BASIC METABOLIC PNL TOTAL CA: CPT

## 2024-03-05 PROCEDURE — 83735 ASSAY OF MAGNESIUM: CPT

## 2024-03-05 PROCEDURE — 6370000000 HC RX 637 (ALT 250 FOR IP): Performed by: INTERNAL MEDICINE

## 2024-03-05 PROCEDURE — 6360000002 HC RX W HCPCS: Performed by: INTERNAL MEDICINE

## 2024-03-05 PROCEDURE — 6360000002 HC RX W HCPCS

## 2024-03-05 PROCEDURE — 80076 HEPATIC FUNCTION PANEL: CPT

## 2024-03-05 PROCEDURE — 36415 COLL VENOUS BLD VENIPUNCTURE: CPT

## 2024-03-05 PROCEDURE — 6370000000 HC RX 637 (ALT 250 FOR IP)

## 2024-03-05 PROCEDURE — 93010 ELECTROCARDIOGRAM REPORT: CPT | Performed by: INTERNAL MEDICINE

## 2024-03-05 PROCEDURE — 99232 SBSQ HOSP IP/OBS MODERATE 35: CPT

## 2024-03-05 RX ORDER — METOPROLOL SUCCINATE 50 MG/1
50 TABLET, EXTENDED RELEASE ORAL DAILY
Status: DISCONTINUED | OUTPATIENT
Start: 2024-03-05 | End: 2024-03-07

## 2024-03-05 RX ADMIN — FUROSEMIDE 40 MG: 10 INJECTION, SOLUTION INTRAMUSCULAR; INTRAVENOUS at 09:10

## 2024-03-05 RX ADMIN — ATORVASTATIN CALCIUM 20 MG: 10 TABLET, FILM COATED ORAL at 09:10

## 2024-03-05 RX ADMIN — ASPIRIN 81 MG: 81 TABLET, COATED ORAL at 09:10

## 2024-03-05 RX ADMIN — METOPROLOL SUCCINATE 50 MG: 50 TABLET, EXTENDED RELEASE ORAL at 09:13

## 2024-03-05 RX ADMIN — APIXABAN 5 MG: 5 TABLET, FILM COATED ORAL at 09:10

## 2024-03-05 RX ADMIN — POTASSIUM CHLORIDE 20 MEQ: 1500 TABLET, EXTENDED RELEASE ORAL at 09:10

## 2024-03-05 RX ADMIN — APIXABAN 5 MG: 5 TABLET, FILM COATED ORAL at 19:55

## 2024-03-05 RX ADMIN — FUROSEMIDE 40 MG: 10 INJECTION, SOLUTION INTRAMUSCULAR; INTRAVENOUS at 18:04

## 2024-03-05 RX ADMIN — DIGOXIN 250 MCG: 0.25 INJECTION INTRAMUSCULAR; INTRAVENOUS at 03:32

## 2024-03-05 ASSESSMENT — PAIN SCALES - GENERAL
PAINLEVEL_OUTOF10: 0

## 2024-03-06 ENCOUNTER — APPOINTMENT (OUTPATIENT)
Dept: ULTRASOUND IMAGING | Age: 59
DRG: 321 | End: 2024-03-06
Payer: COMMERCIAL

## 2024-03-06 LAB
ALBUMIN SERPL-MCNC: 3.4 G/DL (ref 3.4–5)
ALP SERPL-CCNC: 118 U/L (ref 40–129)
ALT SERPL-CCNC: 149 U/L (ref 10–40)
ANION GAP SERPL CALCULATED.3IONS-SCNC: 11 MMOL/L (ref 3–16)
APTT BLD: 32.6 SEC (ref 22.7–35.9)
AST SERPL-CCNC: 47 U/L (ref 15–37)
BILIRUB DIRECT SERPL-MCNC: 0.4 MG/DL (ref 0–0.3)
BILIRUB INDIRECT SERPL-MCNC: 1.2 MG/DL (ref 0–1)
BILIRUB SERPL-MCNC: 1.6 MG/DL (ref 0–1)
BUN SERPL-MCNC: 18 MG/DL (ref 7–20)
CALCIUM SERPL-MCNC: 8.8 MG/DL (ref 8.3–10.6)
CHLORIDE SERPL-SCNC: 101 MMOL/L (ref 99–110)
CO2 SERPL-SCNC: 27 MMOL/L (ref 21–32)
CREAT SERPL-MCNC: 0.9 MG/DL (ref 0.9–1.3)
DEPRECATED RDW RBC AUTO: 15 % (ref 12.4–15.4)
GFR SERPLBLD CREATININE-BSD FMLA CKD-EPI: >60 ML/MIN/{1.73_M2}
GLUCOSE SERPL-MCNC: 87 MG/DL (ref 70–99)
HCT VFR BLD AUTO: 49.5 % (ref 40.5–52.5)
HGB BLD-MCNC: 16.3 G/DL (ref 13.5–17.5)
INR PPP: 1.49 (ref 0.84–1.16)
MAGNESIUM SERPL-MCNC: 2.4 MG/DL (ref 1.8–2.4)
MCH RBC QN AUTO: 30.6 PG (ref 26–34)
MCHC RBC AUTO-ENTMCNC: 32.9 G/DL (ref 31–36)
MCV RBC AUTO: 93 FL (ref 80–100)
PLATELET # BLD AUTO: 222 K/UL (ref 135–450)
PMV BLD AUTO: 9.4 FL (ref 5–10.5)
POTASSIUM SERPL-SCNC: 3.9 MMOL/L (ref 3.5–5.1)
PROT SERPL-MCNC: 5.9 G/DL (ref 6.4–8.2)
PROTHROMBIN TIME: 18 SEC (ref 11.5–14.8)
RBC # BLD AUTO: 5.33 M/UL (ref 4.2–5.9)
SODIUM SERPL-SCNC: 139 MMOL/L (ref 136–145)
WBC # BLD AUTO: 10.4 K/UL (ref 4–11)

## 2024-03-06 PROCEDURE — 6370000000 HC RX 637 (ALT 250 FOR IP)

## 2024-03-06 PROCEDURE — 6360000002 HC RX W HCPCS: Performed by: INTERNAL MEDICINE

## 2024-03-06 PROCEDURE — 80048 BASIC METABOLIC PNL TOTAL CA: CPT

## 2024-03-06 PROCEDURE — 83735 ASSAY OF MAGNESIUM: CPT

## 2024-03-06 PROCEDURE — 6370000000 HC RX 637 (ALT 250 FOR IP): Performed by: INTERNAL MEDICINE

## 2024-03-06 PROCEDURE — 85610 PROTHROMBIN TIME: CPT

## 2024-03-06 PROCEDURE — 85027 COMPLETE CBC AUTOMATED: CPT

## 2024-03-06 PROCEDURE — 85730 THROMBOPLASTIN TIME PARTIAL: CPT

## 2024-03-06 PROCEDURE — 2580000003 HC RX 258: Performed by: INTERNAL MEDICINE

## 2024-03-06 PROCEDURE — 2060000000 HC ICU INTERMEDIATE R&B

## 2024-03-06 PROCEDURE — 99232 SBSQ HOSP IP/OBS MODERATE 35: CPT

## 2024-03-06 PROCEDURE — C8929 TTE W OR WO FOL WCON,DOPPLER: HCPCS

## 2024-03-06 PROCEDURE — 36415 COLL VENOUS BLD VENIPUNCTURE: CPT

## 2024-03-06 PROCEDURE — 80076 HEPATIC FUNCTION PANEL: CPT

## 2024-03-06 PROCEDURE — 76705 ECHO EXAM OF ABDOMEN: CPT

## 2024-03-06 RX ORDER — SODIUM CHLORIDE 9 MG/ML
INJECTION, SOLUTION INTRAVENOUS PRN
Status: DISCONTINUED | OUTPATIENT
Start: 2024-03-06 | End: 2024-03-11 | Stop reason: HOSPADM

## 2024-03-06 RX ORDER — LORAZEPAM 0.5 MG/1
0.5 TABLET ORAL
Status: ACTIVE | OUTPATIENT
Start: 2024-03-06 | End: 2024-03-07

## 2024-03-06 RX ORDER — SODIUM CHLORIDE 0.9 % (FLUSH) 0.9 %
5-40 SYRINGE (ML) INJECTION PRN
Status: DISCONTINUED | OUTPATIENT
Start: 2024-03-06 | End: 2024-03-11 | Stop reason: HOSPADM

## 2024-03-06 RX ORDER — HEPARIN SODIUM 1000 [USP'U]/ML
4000 INJECTION, SOLUTION INTRAVENOUS; SUBCUTANEOUS PRN
Status: DISCONTINUED | OUTPATIENT
Start: 2024-03-06 | End: 2024-03-08

## 2024-03-06 RX ORDER — HEPARIN SODIUM 1000 [USP'U]/ML
4000 INJECTION, SOLUTION INTRAVENOUS; SUBCUTANEOUS ONCE
Status: COMPLETED | OUTPATIENT
Start: 2024-03-06 | End: 2024-03-06

## 2024-03-06 RX ORDER — HEPARIN SODIUM 10000 [USP'U]/100ML
1750 INJECTION, SOLUTION INTRAVENOUS CONTINUOUS
Status: DISCONTINUED | OUTPATIENT
Start: 2024-03-06 | End: 2024-03-08

## 2024-03-06 RX ORDER — DIGOXIN 125 MCG
125 TABLET ORAL DAILY
Status: DISCONTINUED | OUTPATIENT
Start: 2024-03-06 | End: 2024-03-07

## 2024-03-06 RX ORDER — SODIUM CHLORIDE 0.9 % (FLUSH) 0.9 %
5-40 SYRINGE (ML) INJECTION EVERY 12 HOURS SCHEDULED
Status: DISCONTINUED | OUTPATIENT
Start: 2024-03-06 | End: 2024-03-11 | Stop reason: HOSPADM

## 2024-03-06 RX ORDER — ASPIRIN 325 MG
325 TABLET ORAL ONCE
Status: COMPLETED | OUTPATIENT
Start: 2024-03-07 | End: 2024-03-07

## 2024-03-06 RX ORDER — SPIRONOLACTONE 25 MG/1
25 TABLET ORAL DAILY
Status: DISCONTINUED | OUTPATIENT
Start: 2024-03-06 | End: 2024-03-09

## 2024-03-06 RX ORDER — ONDANSETRON 2 MG/ML
4 INJECTION INTRAMUSCULAR; INTRAVENOUS EVERY 6 HOURS PRN
Status: DISCONTINUED | OUTPATIENT
Start: 2024-03-06 | End: 2024-03-11 | Stop reason: HOSPADM

## 2024-03-06 RX ORDER — HEPARIN SODIUM 1000 [USP'U]/ML
2000 INJECTION, SOLUTION INTRAVENOUS; SUBCUTANEOUS PRN
Status: DISCONTINUED | OUTPATIENT
Start: 2024-03-06 | End: 2024-03-08

## 2024-03-06 RX ADMIN — POTASSIUM CHLORIDE 20 MEQ: 1500 TABLET, EXTENDED RELEASE ORAL at 08:28

## 2024-03-06 RX ADMIN — Medication 10 ML: at 21:15

## 2024-03-06 RX ADMIN — HEPARIN SODIUM 4000 UNITS: 1000 INJECTION INTRAVENOUS; SUBCUTANEOUS at 21:10

## 2024-03-06 RX ADMIN — APIXABAN 5 MG: 5 TABLET, FILM COATED ORAL at 08:28

## 2024-03-06 RX ADMIN — FUROSEMIDE 40 MG: 10 INJECTION, SOLUTION INTRAMUSCULAR; INTRAVENOUS at 18:16

## 2024-03-06 RX ADMIN — ASPIRIN 81 MG: 81 TABLET, COATED ORAL at 08:28

## 2024-03-06 RX ADMIN — METOPROLOL SUCCINATE 50 MG: 50 TABLET, EXTENDED RELEASE ORAL at 08:27

## 2024-03-06 RX ADMIN — HEPARIN SODIUM 1000 UNITS/HR: 10000 INJECTION, SOLUTION INTRAVENOUS at 21:14

## 2024-03-06 RX ADMIN — EMPAGLIFLOZIN 10 MG: 10 TABLET, FILM COATED ORAL at 15:48

## 2024-03-06 RX ADMIN — ATORVASTATIN CALCIUM 20 MG: 10 TABLET, FILM COATED ORAL at 08:28

## 2024-03-06 RX ADMIN — DIGOXIN 125 MCG: 125 TABLET ORAL at 09:46

## 2024-03-06 RX ADMIN — FUROSEMIDE 40 MG: 10 INJECTION, SOLUTION INTRAMUSCULAR; INTRAVENOUS at 08:28

## 2024-03-06 RX ADMIN — SPIRONOLACTONE 25 MG: 25 TABLET ORAL at 15:48

## 2024-03-06 NOTE — CONSULTS
Pharmacy to Manage Heparin Infusion per Hospital Nomogram    Dx: Afib  Pt AdjBW = 93Kg  Baseline aPTT = Ordered for 1900    Oral factor Xa-inhibitors may alter and elevate anti-Xa levels used for unfractionated heparin monitoring. As a result, anti-Xa monitoring is not accurate while Xa-inhibitor activity is detectable. Utilize aPTT monitoring when patient received an oral factor Xa-inhibitor (apixaban, betrixaban, edoxaban or rivaroxaban) within 72 hours prior to admission (please document last administration time). The goal is to allow a washout of oral factor Xa-inhibitors by using aPTT for 72 hours, then change to ant-Xa levels for UFH.     Heparin (weight-based) Infusion: CAD/STEMI/NSTEMI/UA/AFib)   Heparin 60 units/kg IVP bolus (max 4,000 units) followed by Heparin infusion at 12 units/kg/hr (recommended max initial rate: 1000 units/hr).  Recheck anti-Xa (unless aPTT being used) in 6 hours.  Goal anti-Xa 0.3-0.7 IU/mL  Goal aPTT =  seconds.    Last dose of apixaban today at 0828  Heparin bolus and drip to start at 2000  Baseline aPTT ordered for 1900  Additional aPTT ordered for 3/7 0200 ~6 hours after drip started    Uriah Roland PharmD  3/6/2024 at 3:38 PM    3/7/24 at 0137  aPTT: 59.1  Plan: give 2000 units of heparin as bolus, increase heparin infusion rate to 1190 units/hr, recheck aPTT at 0830  Len Gallardo PharmD 3/7/2024  2:28 AM    3/7 0854  aPTT = 59.9 sec  Heparin 2000 units bolus and increase drip rate to 1380 units/hr  Recheck aPTT in 6 hours at 1630  Uriah Roland PharmD  3/7/2024 at 10:06 AM      3/7/2024  Heparin infusion stopped at 1600 for [facility]  Will be restarted after TR band removed  Spoke to Marley RN  Will pass along to nightshift RN, to call pharmacy when restarted  Will order anti-Xa level 6 hours after restarting  Alexandra Darling PharmD  3/7/2024 6:11 PM     3/8/24 at 0256  aPTT: 54.1  Plan: Give 4000 units of heparin as bolus, increase heparin infusion rate to 1750 
Consult dictated # [id number]    
venous pressure is greater than 7 cm water.  ABDOMEN:  Obese, soft, nontender.  EXTREMITIES:  Demonstrate 1 to 2+ pitting pretibial dependent edema.  There is no cyanosis.  There is no evidence for chronic venous stasis.  SKIN:  Otherwise warm and dry without skin rash.    DIAGNOSTIC DATA:  A 12-lead ECG from 03/04/2024 demonstrates atrial fibrillation with an average ventricular rate of 153 beats per minute.  There is poor precordial R-wave progression and minor nonspecific T-wave abnormalities.    IMPRESSION:    1. Paroxysmal atrial fibrillation.  2. Acute undefined congestive heart failure.  3. Elevated transaminases and alkaline phosphatase.    The patient presents with evidence for acute-onset congestive heart failure in the presence of rapidly conducted atrial fibrillation.  He was seen in the office on 02/15/2024 with heart rate of 128 beats per minute.  Recent ambulatory event monitoring demonstrates paroxysmal atrial fibrillation, but maybe he has transitioned into early persistent atrial fibrillation at this time.  We will reexamine limited echo to evaluate left ventricular systolic function.  He will require resumption of the metoprolol succinate and diuresis.  He will require full-dose anticoagulation as his CHADS-VASc score is now 2 and he is scheduled for upcoming AF ablation.    RECOMMENDATIONS:    1. Discontinue IV diltiazem.  2. Reinitiate metoprolol succinate.  3. 2D echo.  4. Discontinue low-molecular weight heparin and initiate apixaban 5 mg p.o. q.12 hours.  5. IV diuretics with attention to urine output, electrolyte status, renal function, and clinical status.  6. Repeat hepatic profile.    Thank you for the opportunity to assist in the care of the patient.  Please contact me if you have questions regarding his evaluation.          RUKHSANA BOLDEN MD      D:  03/04/2024 10:00:35     T:  03/04/2024 13:57:45     HARMONY/EDEN  Job #:  [id number]     Doc#:  [id number]

## 2024-03-06 NOTE — DISCHARGE INSTRUCTIONS
trips, and gather supplies and equipment needed before starting an activity.   Position: Avoid tiring and awkward posture that may impair breathing.  Pace: Slow and steady pace, never rushing!  Pursed lip breathing.  Pursed Lip Breathing: \"Smell the roses, blow out the candles\".

## 2024-03-07 ENCOUNTER — ANESTHESIA EVENT (OUTPATIENT)
Dept: CARDIAC CATH/INVASIVE PROCEDURES | Age: 59
End: 2024-03-07
Payer: COMMERCIAL

## 2024-03-07 ENCOUNTER — APPOINTMENT (OUTPATIENT)
Dept: CARDIAC CATH/INVASIVE PROCEDURES | Age: 59
DRG: 321 | End: 2024-03-07
Payer: COMMERCIAL

## 2024-03-07 ENCOUNTER — ANESTHESIA (OUTPATIENT)
Dept: CARDIAC CATH/INVASIVE PROCEDURES | Age: 59
End: 2024-03-07
Payer: COMMERCIAL

## 2024-03-07 PROBLEM — I42.0 DILATED CARDIOMYOPATHY (HCC): Status: ACTIVE | Noted: 2023-11-07

## 2024-03-07 LAB
ALBUMIN SERPL-MCNC: 3.6 G/DL (ref 3.4–5)
ALP SERPL-CCNC: 107 U/L (ref 40–129)
ALT SERPL-CCNC: 128 U/L (ref 10–40)
ANION GAP SERPL CALCULATED.3IONS-SCNC: 12 MMOL/L (ref 3–16)
APTT BLD: 59.1 SEC (ref 22.7–35.9)
APTT BLD: 59.9 SEC (ref 22.7–35.9)
AST SERPL-CCNC: 47 U/L (ref 15–37)
BILIRUB DIRECT SERPL-MCNC: 0.3 MG/DL (ref 0–0.3)
BILIRUB INDIRECT SERPL-MCNC: 0.8 MG/DL (ref 0–1)
BILIRUB SERPL-MCNC: 1.1 MG/DL (ref 0–1)
BUN SERPL-MCNC: 19 MG/DL (ref 7–20)
CALCIUM SERPL-MCNC: 9 MG/DL (ref 8.3–10.6)
CHLORIDE SERPL-SCNC: 98 MMOL/L (ref 99–110)
CHOLEST SERPL-MCNC: 94 MG/DL (ref 0–199)
CO2 SERPL-SCNC: 26 MMOL/L (ref 21–32)
CREAT SERPL-MCNC: 0.8 MG/DL (ref 0.9–1.3)
DEPRECATED RDW RBC AUTO: 14.7 % (ref 12.4–15.4)
EKG ATRIAL RATE: 163 BPM
EKG DIAGNOSIS: NORMAL
EKG Q-T INTERVAL: 356 MS
EKG QRS DURATION: 104 MS
EKG QTC CALCULATION (BAZETT): 496 MS
EKG R AXIS: 49 DEGREES
EKG T AXIS: -32 DEGREES
EKG VENTRICULAR RATE: 117 BPM
GFR SERPLBLD CREATININE-BSD FMLA CKD-EPI: >60 ML/MIN/{1.73_M2}
GLUCOSE SERPL-MCNC: 103 MG/DL (ref 70–99)
HCT VFR BLD AUTO: 49.7 % (ref 40.5–52.5)
HDLC SERPL-MCNC: 36 MG/DL (ref 40–60)
HGB BLD-MCNC: 16.6 G/DL (ref 13.5–17.5)
LDLC SERPL CALC-MCNC: 43 MG/DL
MCH RBC QN AUTO: 30.9 PG (ref 26–34)
MCHC RBC AUTO-ENTMCNC: 33.4 G/DL (ref 31–36)
MCV RBC AUTO: 92.7 FL (ref 80–100)
PLATELET # BLD AUTO: 227 K/UL (ref 135–450)
PMV BLD AUTO: 8.8 FL (ref 5–10.5)
POTASSIUM SERPL-SCNC: 4.3 MMOL/L (ref 3.5–5.1)
PROT SERPL-MCNC: 6.3 G/DL (ref 6.4–8.2)
RBC # BLD AUTO: 5.36 M/UL (ref 4.2–5.9)
SODIUM SERPL-SCNC: 136 MMOL/L (ref 136–145)
TRIGL SERPL-MCNC: 73 MG/DL (ref 0–150)
TROPONIN, HIGH SENSITIVITY: 15 NG/L (ref 0–22)
VLDLC SERPL CALC-MCNC: 15 MG/DL
WBC # BLD AUTO: 10.7 K/UL (ref 4–11)

## 2024-03-07 PROCEDURE — 85730 THROMBOPLASTIN TIME PARTIAL: CPT

## 2024-03-07 PROCEDURE — 92960 CARDIOVERSION ELECTRIC EXT: CPT

## 2024-03-07 PROCEDURE — 3700000000 HC ANESTHESIA ATTENDED CARE: Performed by: ANESTHESIOLOGY

## 2024-03-07 PROCEDURE — 93005 ELECTROCARDIOGRAM TRACING: CPT | Performed by: INTERNAL MEDICINE

## 2024-03-07 PROCEDURE — 99152 MOD SED SAME PHYS/QHP 5/>YRS: CPT | Performed by: INTERNAL MEDICINE

## 2024-03-07 PROCEDURE — B24BZZ4 ULTRASONOGRAPHY OF HEART WITH AORTA, TRANSESOPHAGEAL: ICD-10-PCS | Performed by: INTERNAL MEDICINE

## 2024-03-07 PROCEDURE — 93312 ECHO TRANSESOPHAGEAL: CPT

## 2024-03-07 PROCEDURE — 93458 L HRT ARTERY/VENTRICLE ANGIO: CPT | Performed by: INTERNAL MEDICINE

## 2024-03-07 PROCEDURE — 6370000000 HC RX 637 (ALT 250 FOR IP)

## 2024-03-07 PROCEDURE — 2500000003 HC RX 250 WO HCPCS

## 2024-03-07 PROCEDURE — 2580000003 HC RX 258

## 2024-03-07 PROCEDURE — 93320 DOPPLER ECHO COMPLETE: CPT

## 2024-03-07 PROCEDURE — 6360000002 HC RX W HCPCS

## 2024-03-07 PROCEDURE — 2580000003 HC RX 258: Performed by: INTERNAL MEDICINE

## 2024-03-07 PROCEDURE — C1894 INTRO/SHEATH, NON-LASER: HCPCS | Performed by: INTERNAL MEDICINE

## 2024-03-07 PROCEDURE — 99233 SBSQ HOSP IP/OBS HIGH 50: CPT

## 2024-03-07 PROCEDURE — 6360000004 HC RX CONTRAST MEDICATION

## 2024-03-07 PROCEDURE — 93458 L HRT ARTERY/VENTRICLE ANGIO: CPT

## 2024-03-07 PROCEDURE — 2709999900 HC NON-CHARGEABLE SUPPLY: Performed by: INTERNAL MEDICINE

## 2024-03-07 PROCEDURE — B2151ZZ FLUOROSCOPY OF LEFT HEART USING LOW OSMOLAR CONTRAST: ICD-10-PCS | Performed by: INTERNAL MEDICINE

## 2024-03-07 PROCEDURE — 85027 COMPLETE CBC AUTOMATED: CPT

## 2024-03-07 PROCEDURE — 93325 DOPPLER ECHO COLOR FLOW MAPG: CPT | Performed by: INTERNAL MEDICINE

## 2024-03-07 PROCEDURE — 2060000000 HC ICU INTERMEDIATE R&B

## 2024-03-07 PROCEDURE — 80061 LIPID PANEL: CPT

## 2024-03-07 PROCEDURE — 4A023N7 MEASUREMENT OF CARDIAC SAMPLING AND PRESSURE, LEFT HEART, PERCUTANEOUS APPROACH: ICD-10-PCS | Performed by: INTERNAL MEDICINE

## 2024-03-07 PROCEDURE — C1769 GUIDE WIRE: HCPCS | Performed by: INTERNAL MEDICINE

## 2024-03-07 PROCEDURE — 93325 DOPPLER ECHO COLOR FLOW MAPG: CPT

## 2024-03-07 PROCEDURE — 84484 ASSAY OF TROPONIN QUANT: CPT

## 2024-03-07 PROCEDURE — 80048 BASIC METABOLIC PNL TOTAL CA: CPT

## 2024-03-07 PROCEDURE — 80076 HEPATIC FUNCTION PANEL: CPT

## 2024-03-07 PROCEDURE — 2580000003 HC RX 258: Performed by: NURSE ANESTHETIST, CERTIFIED REGISTERED

## 2024-03-07 PROCEDURE — 6360000002 HC RX W HCPCS: Performed by: INTERNAL MEDICINE

## 2024-03-07 PROCEDURE — 93010 ELECTROCARDIOGRAM REPORT: CPT | Performed by: INTERNAL MEDICINE

## 2024-03-07 PROCEDURE — B2111ZZ FLUOROSCOPY OF MULTIPLE CORONARY ARTERIES USING LOW OSMOLAR CONTRAST: ICD-10-PCS | Performed by: INTERNAL MEDICINE

## 2024-03-07 PROCEDURE — 2500000003 HC RX 250 WO HCPCS: Performed by: NURSE ANESTHETIST, CERTIFIED REGISTERED

## 2024-03-07 PROCEDURE — 6370000000 HC RX 637 (ALT 250 FOR IP): Performed by: INTERNAL MEDICINE

## 2024-03-07 PROCEDURE — 92960 CARDIOVERSION ELECTRIC EXT: CPT | Performed by: INTERNAL MEDICINE

## 2024-03-07 PROCEDURE — 36415 COLL VENOUS BLD VENIPUNCTURE: CPT

## 2024-03-07 PROCEDURE — 6360000002 HC RX W HCPCS: Performed by: NURSE ANESTHETIST, CERTIFIED REGISTERED

## 2024-03-07 PROCEDURE — 93312 ECHO TRANSESOPHAGEAL: CPT | Performed by: INTERNAL MEDICINE

## 2024-03-07 RX ORDER — METOPROLOL SUCCINATE 50 MG/1
50 TABLET, EXTENDED RELEASE ORAL 2 TIMES DAILY
Status: DISCONTINUED | OUTPATIENT
Start: 2024-03-07 | End: 2024-03-09

## 2024-03-07 RX ORDER — ONDANSETRON 2 MG/ML
4 INJECTION INTRAMUSCULAR; INTRAVENOUS EVERY 30 MIN PRN
OUTPATIENT
Start: 2024-03-07

## 2024-03-07 RX ORDER — HYDROMORPHONE HYDROCHLORIDE 1 MG/ML
0.25 INJECTION, SOLUTION INTRAMUSCULAR; INTRAVENOUS; SUBCUTANEOUS EVERY 5 MIN PRN
OUTPATIENT
Start: 2024-03-07

## 2024-03-07 RX ORDER — LIDOCAINE HYDROCHLORIDE 20 MG/ML
INJECTION, SOLUTION INFILTRATION; PERINEURAL PRN
Status: DISCONTINUED | OUTPATIENT
Start: 2024-03-07 | End: 2024-03-07 | Stop reason: SDUPTHER

## 2024-03-07 RX ORDER — ATORVASTATIN CALCIUM 40 MG/1
40 TABLET, FILM COATED ORAL DAILY
Status: DISCONTINUED | OUTPATIENT
Start: 2024-03-08 | End: 2024-03-11 | Stop reason: HOSPADM

## 2024-03-07 RX ORDER — HEPARIN SODIUM 1000 [USP'U]/ML
INJECTION, SOLUTION INTRAVENOUS; SUBCUTANEOUS
Status: COMPLETED | OUTPATIENT
Start: 2024-03-07 | End: 2024-03-07

## 2024-03-07 RX ORDER — SODIUM CHLORIDE, SODIUM LACTATE, POTASSIUM CHLORIDE, CALCIUM CHLORIDE 600; 310; 30; 20 MG/100ML; MG/100ML; MG/100ML; MG/100ML
INJECTION, SOLUTION INTRAVENOUS CONTINUOUS PRN
Status: DISCONTINUED | OUTPATIENT
Start: 2024-03-07 | End: 2024-03-07 | Stop reason: SDUPTHER

## 2024-03-07 RX ORDER — HEPARIN SODIUM 1000 [USP'U]/ML
2000 INJECTION, SOLUTION INTRAVENOUS; SUBCUTANEOUS ONCE
Status: COMPLETED | OUTPATIENT
Start: 2024-03-07 | End: 2024-03-07

## 2024-03-07 RX ORDER — SODIUM CHLORIDE 9 MG/ML
INJECTION, SOLUTION INTRAVENOUS PRN
OUTPATIENT
Start: 2024-03-07

## 2024-03-07 RX ORDER — PROPOFOL 10 MG/ML
INJECTION, EMULSION INTRAVENOUS PRN
Status: DISCONTINUED | OUTPATIENT
Start: 2024-03-07 | End: 2024-03-07 | Stop reason: SDUPTHER

## 2024-03-07 RX ORDER — HYDROMORPHONE HYDROCHLORIDE 1 MG/ML
0.5 INJECTION, SOLUTION INTRAMUSCULAR; INTRAVENOUS; SUBCUTANEOUS EVERY 5 MIN PRN
OUTPATIENT
Start: 2024-03-07

## 2024-03-07 RX ORDER — NALOXONE HYDROCHLORIDE 0.4 MG/ML
INJECTION, SOLUTION INTRAMUSCULAR; INTRAVENOUS; SUBCUTANEOUS PRN
OUTPATIENT
Start: 2024-03-07

## 2024-03-07 RX ORDER — LABETALOL HYDROCHLORIDE 5 MG/ML
10 INJECTION, SOLUTION INTRAVENOUS
OUTPATIENT
Start: 2024-03-07

## 2024-03-07 RX ORDER — MIDAZOLAM HYDROCHLORIDE 1 MG/ML
INJECTION INTRAMUSCULAR; INTRAVENOUS
Status: COMPLETED | OUTPATIENT
Start: 2024-03-07 | End: 2024-03-07

## 2024-03-07 RX ORDER — FENTANYL CITRATE 50 UG/ML
INJECTION, SOLUTION INTRAMUSCULAR; INTRAVENOUS
Status: COMPLETED | OUTPATIENT
Start: 2024-03-07 | End: 2024-03-07

## 2024-03-07 RX ORDER — SODIUM CHLORIDE 0.9 % (FLUSH) 0.9 %
5-40 SYRINGE (ML) INJECTION EVERY 12 HOURS SCHEDULED
OUTPATIENT
Start: 2024-03-07

## 2024-03-07 RX ORDER — SODIUM CHLORIDE 0.9 % (FLUSH) 0.9 %
5-40 SYRINGE (ML) INJECTION PRN
OUTPATIENT
Start: 2024-03-07

## 2024-03-07 RX ORDER — FUROSEMIDE 10 MG/ML
20 INJECTION INTRAMUSCULAR; INTRAVENOUS 2 TIMES DAILY
Status: DISCONTINUED | OUTPATIENT
Start: 2024-03-08 | End: 2024-03-09

## 2024-03-07 RX ORDER — OXYCODONE HYDROCHLORIDE 5 MG/1
10 TABLET ORAL PRN
OUTPATIENT
Start: 2024-03-07 | End: 2024-03-07

## 2024-03-07 RX ORDER — OXYCODONE HYDROCHLORIDE 5 MG/1
5 TABLET ORAL PRN
OUTPATIENT
Start: 2024-03-07 | End: 2024-03-07

## 2024-03-07 RX ADMIN — AMIODARONE HYDROCHLORIDE 150 MG: 50 INJECTION, SOLUTION INTRAVENOUS at 17:31

## 2024-03-07 RX ADMIN — ASPIRIN 325 MG: 325 TABLET ORAL at 08:57

## 2024-03-07 RX ADMIN — AMIODARONE HYDROCHLORIDE 1 MG/MIN: 50 INJECTION, SOLUTION INTRAVENOUS at 17:43

## 2024-03-07 RX ADMIN — HEPARIN SODIUM 5000 UNITS: 1000 INJECTION, SOLUTION INTRAVENOUS; SUBCUTANEOUS at 16:46

## 2024-03-07 RX ADMIN — Medication 10 ML: at 08:57

## 2024-03-07 RX ADMIN — LIDOCAINE HYDROCHLORIDE 60 MG: 20 INJECTION, SOLUTION INFILTRATION; PERINEURAL at 15:14

## 2024-03-07 RX ADMIN — METOPROLOL SUCCINATE 50 MG: 50 TABLET, EXTENDED RELEASE ORAL at 08:57

## 2024-03-07 RX ADMIN — FUROSEMIDE 40 MG: 10 INJECTION, SOLUTION INTRAMUSCULAR; INTRAVENOUS at 17:50

## 2024-03-07 RX ADMIN — DIGOXIN 125 MCG: 125 TABLET ORAL at 08:57

## 2024-03-07 RX ADMIN — FENTANYL CITRATE 50 MCG: 50 INJECTION, SOLUTION INTRAMUSCULAR; INTRAVENOUS at 16:43

## 2024-03-07 RX ADMIN — METOPROLOL SUCCINATE 50 MG: 50 TABLET, EXTENDED RELEASE ORAL at 20:41

## 2024-03-07 RX ADMIN — SODIUM CHLORIDE, SODIUM LACTATE, POTASSIUM CHLORIDE, AND CALCIUM CHLORIDE: .6; .31; .03; .02 INJECTION, SOLUTION INTRAVENOUS at 15:10

## 2024-03-07 RX ADMIN — HEPARIN SODIUM 2000 UNITS: 1000 INJECTION INTRAVENOUS; SUBCUTANEOUS at 03:04

## 2024-03-07 RX ADMIN — SPIRONOLACTONE 25 MG: 25 TABLET ORAL at 08:57

## 2024-03-07 RX ADMIN — Medication 10 ML: at 20:40

## 2024-03-07 RX ADMIN — ATORVASTATIN CALCIUM 20 MG: 10 TABLET, FILM COATED ORAL at 08:57

## 2024-03-07 RX ADMIN — HEPARIN SODIUM 2000 UNITS: 1000 INJECTION INTRAVENOUS; SUBCUTANEOUS at 10:27

## 2024-03-07 RX ADMIN — MIDAZOLAM HYDROCHLORIDE 1 MG: 1 INJECTION INTRAMUSCULAR; INTRAVENOUS at 16:42

## 2024-03-07 RX ADMIN — HEPARIN SODIUM 1380 UNITS/HR: 10000 INJECTION, SOLUTION INTRAVENOUS at 22:39

## 2024-03-07 RX ADMIN — FUROSEMIDE 40 MG: 10 INJECTION, SOLUTION INTRAMUSCULAR; INTRAVENOUS at 08:56

## 2024-03-07 RX ADMIN — PROPOFOL 100 MG: 10 INJECTION, EMULSION INTRAVENOUS at 15:14

## 2024-03-07 ASSESSMENT — PAIN SCALES - GENERAL
PAINLEVEL_OUTOF10: 0

## 2024-03-07 NOTE — ANESTHESIA PRE PROCEDURE
(ALDACTONE) tablet 25 mg  25 mg Oral Daily Sylvia Philip APRN - CNP   25 mg at 03/07/24 0857   • heparin (porcine) injection 4,000 Units  4,000 Units IntraVENous PRN Marvin Noe MD       • heparin (porcine) injection 2,000 Units  2,000 Units IntraVENous PRN Marvin Noe MD       • heparin 25,000 units in dextrose 5% 250 mL (premix) infusion  1,380 Units/hr IntraVENous Continuous John Gracia MD 13.8 mL/hr at 03/07/24 1028 1,380 Units/hr at 03/07/24 1028   • sodium chloride flush 0.9 % injection 5-40 mL  5-40 mL IntraVENous 2 times per day Marvin Noe MD   10 mL at 03/07/24 0857   • sodium chloride flush 0.9 % injection 5-40 mL  5-40 mL IntraVENous PRN Marvin Noe MD       • 0.9 % sodium chloride infusion   IntraVENous PRN Marvin Noe MD       • ondansetron (ZOFRAN) injection 4 mg  4 mg IntraVENous Q6H PRN Marvin Noe MD       • LORazepam (ATIVAN) tablet 0.5 mg  0.5 mg Oral Once PRN Marvin Noe MD       • metoprolol succinate (TOPROL XL) extended release tablet 50 mg  50 mg Oral Daily Sylvia Phliip APRN - CNP   50 mg at 03/07/24 0857   • furosemide (LASIX) injection 40 mg  40 mg IntraVENous BID Aubrey Alex MD   40 mg at 03/07/24 0856   • aspirin EC tablet 81 mg  81 mg Oral Daily John Gracia MD   81 mg at 03/06/24 0828   • atorvastatin (LIPITOR) tablet 20 mg  20 mg Oral Daily John Gracia MD   20 mg at 03/07/24 0857   • [Held by provider] apixaban (ELIQUIS) tablet 5 mg  5 mg Oral BID Sylvia Philip APRN - CNP   5 mg at 03/06/24 0828       Allergies:  No Known Allergies    Problem List:    Patient Active Problem List   Diagnosis Code   • Essential hypertension I10   • 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

## 2024-03-07 NOTE — PERIOP NOTE
CATH LAB PROCEDURE LOG - TRANSESOPHAGEAL ECHOCARDIOGRAM, POSSIBLE CARDIOVERSION    PRE Procedure:    ARRIVAL TIME: 2:55 PM    Pt arrived to Cath Lab.   Plan of Care: Hemodynamics and cardiac rhythm will remain stable. Comfort level will be maintained. Respiratory function will remain adequate. Pt/family will verbalize understanding of the procedure. Procedure will be tolerated without complications. Patient will recover from procedure without complications.   ID armband on patient and identification verified.   Informed consent obtained.   Non invasive blood pressure cuff applied, monitoring initiated.   EKG pads and pulse oximeter applied, monitoring initiated.   Instructions given. Patient and / or family verbalize understanding.   H&P will be documented by physician in Epic.     Pt has been NPO since midnight.     Post DCCV EKG ordered? Yes    Defibrillator pads applied to chest.       Transesophageal Echocardiogram:    Timeout and Fire Safety completed at 3:10 PM.     Correct patient verified.   Members of the surgical team/visitors introduced.   Allergies announced.   Correct procedure verified.   Correct procedure site verified.   Consents verified.   Implant equipment, additional services, special requirements available.   Medications labeled and available.   Appropriate pre medications have been administered.     Alcohol prep solution had sufficient time to dissipate if used. Yes=1, No=0  Surgical site or incision above the xyphoid. Yes=1, No=0  Open oxygen source. Yes=1, No=0  Available ignition source. Yes=1, No=0  Score total - 1.     Procedure Medication:     Anesthesia sedates patient, refer to anesthesia log.      3:14 PM ALICIA probe passed and images obtained.     3:17 PM Bubble test performed.         3:18 PM ALICIA probe removed without incident.       Cardioversion:    Pt remains sedated by anesthesia for cardioversion.     Synchronized Cardioversion performed at 200 joules  3:19 PM Rhythm was  not

## 2024-03-07 NOTE — PRE SEDATION
fentanyl intravenously    Patient is an appropriate candidate for plan of sedation: yes    Electronically signed by COCO BEEBE MD on 3/7/2024 at 5:07 PM

## 2024-03-07 NOTE — FLOWSHEET NOTE
Pt back to room 202 from cath lab. VSS. Call light within reach, TR band on right radial site and site without hematoma, redness, drainage, or swelling. Pt without c/o pain, numbness or tingling. Amio bolus and gtt started per order, see MAR.       03/07/24 1722   Vitals   Temp 98.8 °F (37.1 °C)   Temp Source Oral   Pulse (!) 106   Heart Rate Source Monitor   Respirations 18   BP (!) 116/95   MAP (Calculated) 102   BP Location Left upper arm   Patient Position Semi fowlers   Pain Assessment   Pain Assessment 0-10   Pain Level 0   Oxygen Therapy   SpO2 98 %   O2 Device None (Room air)   RUE Neurovascular Assessment   Capillary Refill Greater than 3 seconds   Color Appropriate for Ethnicity   Temperature Cool   RUE Sensation  Full sensation;No numbness;No pain;No tingling   R Radial Pulse +2 (Moderate)   Puncture Site Assessment 1   Location Radial - right   Site Assessment No redness, drainage, swelling or hematoma   Hemostasis Intervention Radial Compression Device   Dressing Applied No   Multiple puncture sites No

## 2024-03-08 ENCOUNTER — APPOINTMENT (OUTPATIENT)
Dept: CARDIAC CATH/INVASIVE PROCEDURES | Age: 59
DRG: 321 | End: 2024-03-08
Payer: COMMERCIAL

## 2024-03-08 PROBLEM — I25.119 CORONARY ARTERY DISEASE INVOLVING NATIVE CORONARY ARTERY OF NATIVE HEART WITH ANGINA PECTORIS (HCC): Status: ACTIVE | Noted: 2024-03-08

## 2024-03-08 LAB
ANION GAP SERPL CALCULATED.3IONS-SCNC: 12 MMOL/L (ref 3–16)
APTT BLD: 54.1 SEC (ref 22.7–35.9)
BUN SERPL-MCNC: 18 MG/DL (ref 7–20)
CALCIUM SERPL-MCNC: 8.9 MG/DL (ref 8.3–10.6)
CHLORIDE SERPL-SCNC: 103 MMOL/L (ref 99–110)
CO2 SERPL-SCNC: 26 MMOL/L (ref 21–32)
CREAT SERPL-MCNC: 1 MG/DL (ref 0.9–1.3)
GFR SERPLBLD CREATININE-BSD FMLA CKD-EPI: >60 ML/MIN/{1.73_M2}
GLUCOSE SERPL-MCNC: 100 MG/DL (ref 70–99)
MAGNESIUM SERPL-MCNC: 2.5 MG/DL (ref 1.8–2.4)
POTASSIUM SERPL-SCNC: 4.1 MMOL/L (ref 3.5–5.1)
SODIUM SERPL-SCNC: 141 MMOL/L (ref 136–145)

## 2024-03-08 PROCEDURE — 92979 ENDOLUMINL IVUS OCT C EA: CPT

## 2024-03-08 PROCEDURE — 2580000003 HC RX 258

## 2024-03-08 PROCEDURE — 2500000003 HC RX 250 WO HCPCS

## 2024-03-08 PROCEDURE — 6360000002 HC RX W HCPCS: Performed by: INTERNAL MEDICINE

## 2024-03-08 PROCEDURE — 83735 ASSAY OF MAGNESIUM: CPT

## 2024-03-08 PROCEDURE — 92978 ENDOLUMINL IVUS OCT C 1ST: CPT

## 2024-03-08 PROCEDURE — 93005 ELECTROCARDIOGRAM TRACING: CPT | Performed by: INTERNAL MEDICINE

## 2024-03-08 PROCEDURE — C1874 STENT, COATED/COV W/DEL SYS: HCPCS | Performed by: INTERNAL MEDICINE

## 2024-03-08 PROCEDURE — C1753 CATH, INTRAVAS ULTRASOUND: HCPCS | Performed by: INTERNAL MEDICINE

## 2024-03-08 PROCEDURE — 85347 COAGULATION TIME ACTIVATED: CPT

## 2024-03-08 PROCEDURE — 027034Z DILATION OF CORONARY ARTERY, ONE ARTERY WITH DRUG-ELUTING INTRALUMINAL DEVICE, PERCUTANEOUS APPROACH: ICD-10-PCS | Performed by: INTERNAL MEDICINE

## 2024-03-08 PROCEDURE — 6370000000 HC RX 637 (ALT 250 FOR IP)

## 2024-03-08 PROCEDURE — 92979 ENDOLUMINL IVUS OCT C EA: CPT | Performed by: INTERNAL MEDICINE

## 2024-03-08 PROCEDURE — 2580000003 HC RX 258: Performed by: INTERNAL MEDICINE

## 2024-03-08 PROCEDURE — 6360000002 HC RX W HCPCS

## 2024-03-08 PROCEDURE — 6370000000 HC RX 637 (ALT 250 FOR IP): Performed by: INTERNAL MEDICINE

## 2024-03-08 PROCEDURE — 2709999900 HC NON-CHARGEABLE SUPPLY: Performed by: INTERNAL MEDICINE

## 2024-03-08 PROCEDURE — C1894 INTRO/SHEATH, NON-LASER: HCPCS | Performed by: INTERNAL MEDICINE

## 2024-03-08 PROCEDURE — 92978 ENDOLUMINL IVUS OCT C 1ST: CPT | Performed by: INTERNAL MEDICINE

## 2024-03-08 PROCEDURE — 99233 SBSQ HOSP IP/OBS HIGH 50: CPT

## 2024-03-08 PROCEDURE — B241ZZ3 ULTRASONOGRAPHY OF MULTIPLE CORONARY ARTERIES, INTRAVASCULAR: ICD-10-PCS | Performed by: INTERNAL MEDICINE

## 2024-03-08 PROCEDURE — 93458 L HRT ARTERY/VENTRICLE ANGIO: CPT | Performed by: INTERNAL MEDICINE

## 2024-03-08 PROCEDURE — 36415 COLL VENOUS BLD VENIPUNCTURE: CPT

## 2024-03-08 PROCEDURE — 6360000004 HC RX CONTRAST MEDICATION

## 2024-03-08 PROCEDURE — C1887 CATHETER, GUIDING: HCPCS | Performed by: INTERNAL MEDICINE

## 2024-03-08 PROCEDURE — C1725 CATH, TRANSLUMIN NON-LASER: HCPCS | Performed by: INTERNAL MEDICINE

## 2024-03-08 PROCEDURE — 80048 BASIC METABOLIC PNL TOTAL CA: CPT

## 2024-03-08 PROCEDURE — 2060000000 HC ICU INTERMEDIATE R&B

## 2024-03-08 PROCEDURE — 99233 SBSQ HOSP IP/OBS HIGH 50: CPT | Performed by: INTERNAL MEDICINE

## 2024-03-08 PROCEDURE — 027035Z DILATION OF CORONARY ARTERY, ONE ARTERY WITH TWO DRUG-ELUTING INTRALUMINAL DEVICES, PERCUTANEOUS APPROACH: ICD-10-PCS | Performed by: INTERNAL MEDICINE

## 2024-03-08 PROCEDURE — C1769 GUIDE WIRE: HCPCS | Performed by: INTERNAL MEDICINE

## 2024-03-08 PROCEDURE — 92928 PRQ TCAT PLMT NTRAC ST 1 LES: CPT | Performed by: INTERNAL MEDICINE

## 2024-03-08 PROCEDURE — 92928 PRQ TCAT PLMT NTRAC ST 1 LES: CPT

## 2024-03-08 PROCEDURE — 85730 THROMBOPLASTIN TIME PARTIAL: CPT

## 2024-03-08 PROCEDURE — B2111ZZ FLUOROSCOPY OF MULTIPLE CORONARY ARTERIES USING LOW OSMOLAR CONTRAST: ICD-10-PCS | Performed by: INTERNAL MEDICINE

## 2024-03-08 RX ORDER — MIDAZOLAM HYDROCHLORIDE 1 MG/ML
INJECTION INTRAMUSCULAR; INTRAVENOUS
Status: COMPLETED | OUTPATIENT
Start: 2024-03-08 | End: 2024-03-08

## 2024-03-08 RX ORDER — SODIUM CHLORIDE 0.9 % (FLUSH) 0.9 %
5-40 SYRINGE (ML) INJECTION EVERY 12 HOURS SCHEDULED
Status: DISCONTINUED | OUTPATIENT
Start: 2024-03-08 | End: 2024-03-11 | Stop reason: HOSPADM

## 2024-03-08 RX ORDER — SODIUM CHLORIDE 9 MG/ML
INJECTION, SOLUTION INTRAVENOUS CONTINUOUS
Status: ACTIVE | OUTPATIENT
Start: 2024-03-08 | End: 2024-03-08

## 2024-03-08 RX ORDER — PHENYLEPHRINE HCL IN 0.9% NACL 1 MG/10 ML
SYRINGE (ML) INTRAVENOUS
Status: COMPLETED | OUTPATIENT
Start: 2024-03-08 | End: 2024-03-08

## 2024-03-08 RX ORDER — AMIODARONE HYDROCHLORIDE 200 MG/1
200 TABLET ORAL 2 TIMES DAILY
Status: DISCONTINUED | OUTPATIENT
Start: 2024-03-08 | End: 2024-03-10

## 2024-03-08 RX ORDER — SODIUM CHLORIDE 0.9 % (FLUSH) 0.9 %
5-40 SYRINGE (ML) INJECTION PRN
Status: DISCONTINUED | OUTPATIENT
Start: 2024-03-08 | End: 2024-03-11 | Stop reason: HOSPADM

## 2024-03-08 RX ORDER — SODIUM CHLORIDE 9 MG/ML
INJECTION, SOLUTION INTRAVENOUS PRN
Status: DISCONTINUED | OUTPATIENT
Start: 2024-03-08 | End: 2024-03-11 | Stop reason: HOSPADM

## 2024-03-08 RX ORDER — SODIUM CHLORIDE 9 MG/ML
INJECTION, SOLUTION INTRAVENOUS CONTINUOUS PRN
Status: COMPLETED | OUTPATIENT
Start: 2024-03-08 | End: 2024-03-08

## 2024-03-08 RX ORDER — FENTANYL CITRATE 50 UG/ML
INJECTION, SOLUTION INTRAMUSCULAR; INTRAVENOUS
Status: COMPLETED | OUTPATIENT
Start: 2024-03-08 | End: 2024-03-08

## 2024-03-08 RX ORDER — HEPARIN SODIUM 1000 [USP'U]/ML
4000 INJECTION, SOLUTION INTRAVENOUS; SUBCUTANEOUS ONCE
Status: COMPLETED | OUTPATIENT
Start: 2024-03-08 | End: 2024-03-08

## 2024-03-08 RX ORDER — ACETAMINOPHEN 325 MG/1
650 TABLET ORAL EVERY 4 HOURS PRN
Status: DISCONTINUED | OUTPATIENT
Start: 2024-03-08 | End: 2024-03-11 | Stop reason: HOSPADM

## 2024-03-08 RX ORDER — ONDANSETRON 2 MG/ML
4 INJECTION INTRAMUSCULAR; INTRAVENOUS EVERY 6 HOURS PRN
Status: DISCONTINUED | OUTPATIENT
Start: 2024-03-08 | End: 2024-03-08 | Stop reason: SDUPTHER

## 2024-03-08 RX ADMIN — Medication 10 ML: at 09:52

## 2024-03-08 RX ADMIN — METOPROLOL SUCCINATE 50 MG: 50 TABLET, EXTENDED RELEASE ORAL at 12:23

## 2024-03-08 RX ADMIN — APIXABAN 5 MG: 5 TABLET, FILM COATED ORAL at 18:23

## 2024-03-08 RX ADMIN — ASPIRIN 81 MG: 81 TABLET, COATED ORAL at 09:51

## 2024-03-08 RX ADMIN — SODIUM CHLORIDE 488 ML: 9 INJECTION, SOLUTION INTRAVENOUS at 11:37

## 2024-03-08 RX ADMIN — FENTANYL CITRATE 50 MCG: 50 INJECTION, SOLUTION INTRAMUSCULAR; INTRAVENOUS at 10:32

## 2024-03-08 RX ADMIN — HEPARIN SODIUM 4000 UNITS: 1000 INJECTION INTRAVENOUS; SUBCUTANEOUS at 04:53

## 2024-03-08 RX ADMIN — FENTANYL CITRATE 25 MCG: 50 INJECTION, SOLUTION INTRAMUSCULAR; INTRAVENOUS at 11:03

## 2024-03-08 RX ADMIN — DEXTROSE MONOHYDRATE 0.5 MG/MIN: 50 INJECTION, SOLUTION INTRAVENOUS at 15:30

## 2024-03-08 RX ADMIN — Medication 0.1 MG: at 10:39

## 2024-03-08 RX ADMIN — Medication 0.1 MG: at 11:17

## 2024-03-08 RX ADMIN — SODIUM CHLORIDE: 9 INJECTION, SOLUTION INTRAVENOUS at 12:04

## 2024-03-08 RX ADMIN — Medication 10 ML: at 20:16

## 2024-03-08 RX ADMIN — SPIRONOLACTONE 25 MG: 25 TABLET ORAL at 18:14

## 2024-03-08 RX ADMIN — FUROSEMIDE 20 MG: 10 INJECTION, SOLUTION INTRAMUSCULAR; INTRAVENOUS at 18:14

## 2024-03-08 RX ADMIN — ATORVASTATIN CALCIUM 40 MG: 40 TABLET, FILM COATED ORAL at 09:51

## 2024-03-08 RX ADMIN — MIDAZOLAM HYDROCHLORIDE 2 MG: 1 INJECTION INTRAMUSCULAR; INTRAVENOUS at 10:32

## 2024-03-08 RX ADMIN — DEXTROSE MONOHYDRATE 0.5 MG/MIN: 50 INJECTION, SOLUTION INTRAVENOUS at 01:07

## 2024-03-08 RX ADMIN — TICAGRELOR 90 MG: 90 TABLET ORAL at 20:16

## 2024-03-08 RX ADMIN — TICAGRELOR 180 MG: 90 TABLET ORAL at 09:52

## 2024-03-08 RX ADMIN — MIDAZOLAM HYDROCHLORIDE 1 MG: 1 INJECTION INTRAMUSCULAR; INTRAVENOUS at 11:03

## 2024-03-08 ASSESSMENT — PAIN SCALES - GENERAL
PAINLEVEL_OUTOF10: 0

## 2024-03-08 NOTE — POST SEDATION
Patient:  Weston Edmond   :   1965    A pre-sedation re-evaluation was performed immediately at the end of the procedure.  Procedure:  Cardiac cath  Medications: Procedural sedation with minimal conscious sedation  Complications: None  Estimated Blood Loss: none  Specimens: Were not obtained        Rm Medication and Procedural Reconciliation:  I agree that the documented medications and procedures performed are true.  The medications were given under my order.  The procedures were performed under my direct supervision.

## 2024-03-08 NOTE — CARE COORDINATION
Spoke with MD who states patient had his cath yesterday and, if everything looks ok, should discharge tomorrow with no needs.   
Spoke with RN who states patient is scheduled  for a cath today with possible cardioversion.  Patient is concerned about being able to afford his medications at discharge.  Placed call to Idris with Pharmacy to update him on the above. He states they will run patient's benefits at discharge and assist with costs as able.  Updated patient on the above.  He and SO do not anticipate any other needs from CM.   
Housing: Yes  Potential Assistance needed at discharge: Other (Comment) (likely no needs)            Potential DME:    Patient expects to discharge to: House  Plan for transportation at discharge:       Financial     Payor: [employer] MEDICARE / Plan: [employer] CHOICE-PPO MEDICARE / Product Type: *No Product type* /      Does insurance require precert for SNF: Yes     Potential assistance Purchasing Medications: No  Meds-to-Beds request: Yes        [id number] - [place], [place] - [address] - P [phone] - F [phone] [address] [address]  Phone: [phone] Fax: [phone]        Notes:     Factors facilitating achievement of predicted outcomes: Family support, Motivated, Cooperative, and Pleasant        Additional Case Management Notes: Spoke with patient and significant other at bedside.  He lives in a house with his SO.  He is independent and drives.  He denies any services or DME other than his CPAP at home.   He does not anticipate having any needs.            IF APPLICABLE: The Patient and/or patient representative Isrrael and his family were provided with a choice of provider and agrees with the discharge plan. Freedom of choice list with basic dialogue that supports the patient's individualized plan of care/goals and shares the quality data associated with the providers was provided to:     Patient Representative Name:        The Patient and/or Patient Representative Agree with the Discharge Plan?       Katy Loredo RN  Case Management Department  Ph: [phone]

## 2024-03-08 NOTE — TELEPHONE ENCOUNTER
Patient still admitted. Underwent LHC today (3/8/2024) with LOKESH to RCA and CX. Spoke with NPKK-she needs to speak with CMV regarding patient to see if plan is still to proceed with AF RFCA 3/15/2024. Patient now on ASA, Brilinta and Eliquis (was held for cath).      Need to follow up Monday 3/11/2024 if haven't heard back regarding plan.

## 2024-03-08 NOTE — ANESTHESIA POSTPROCEDURE EVALUATION
Department of Anesthesiology  Postprocedure Note    Patient: Weston Edmond  MRN: [id number]  YOB: 1965  Date of evaluation: 3/7/2024    Procedure Summary       Date: 03/07/24 Room / Location: [facility] Cardiac Cath Lab    Anesthesia Start: 1510 Anesthesia Stop: 1524    Procedure: CARDIOVERSION WITH A ALICIA Diagnosis:     Scheduled Providers: Maxwell Moeller MD Responsible Provider: Maxwell Moeller MD    Anesthesia Type: MAC ASA Status: 3            Anesthesia Type: MAC    Cyrus Phase I:      Cyrus Phase II:      Anesthesia Post Evaluation    Patient location during evaluation: PACU  Level of consciousness: awake  Airway patency: patent  Nausea & Vomiting: no vomiting  Cardiovascular status: blood pressure returned to baseline  Respiratory status: acceptable  Hydration status: stable  Pain management: adequate    No notable events documented.

## 2024-03-08 NOTE — PRE SEDATION
[de-identified] : Patient has patellofemoral pain syndrome.  Diagnosis and x-ray images were discussed with the patient.  Today, conservative treatment was initiated.  Patient was given a prescription for physical therapy to work on strengthening, stretching, range of motion, modalities.  Is also given a prescription for meloxicam 15 mg to take on an as-needed basis.  Pt was educated about risks and benefits of anti-inflammatories.  Anti-inflammatories can irritate the stomach lining, so if there are any symptoms of acid reflux or heartburn the patient was instructed to stop taking the medication. To help prevent this, it is best to take with a meal. They cannot be taken if the pt is on blood thinners and if the patient is at risk of high blood pressure, blood pressure should be monitored daily while taking the medication.  Patient is advised to help the pain, he can ice the knee and alternate with warm compresses, and he can also wear a compressive sleeve knee brace to offer some relief.  I also encouraged the patient to rest from walking and running for at least 2 weeks until pain has vastly improved or resolved.  Educated that good exercises for the knee to decrease stressors of the knee include swimming or biking.  Patient is agreeable to the above plan and all questions were answered.  Patient will follow-up in 6 to 8 weeks.\par \par Supervising physician: Dr. Aldridge  ([facility]) 09/14/2023    Sleep apnea        Surgical History:    Past Surgical History:   Procedure Laterality Date    BREAST BIOPSY      CARDIOVERSION  3/7/2024    COLONOSCOPY      repeat in 2022    COLONOSCOPY N/A 8/22/2023    COLONOSCOPY performed by Krystle Perez MD at [facility] ENDOSCOPY    US THYROID BIOPSY  8/16/2021    US THYROID BIOPSY 8/16/2021 [facility] ULTRASOUND    VASECTOMY               Pre-Sedation:  Pre-Sedation Documentation and Exam:  I have personally completed a history, physical exam & review of systems for this patient (see notes).    Prior History of Anesthesia Complications:   none    Modified Mallampati:  II (soft palate, uvula, fauces visible)    ASA Classification:  Class 3 - A patient with severe systemic disease that limits activity but is not incapacitating    Cyrus Scale:  Activity:  2 - Able to move 4 extremities voluntarily on command  Respiration:  2 - Able to breathe deeply and cough freely  Circulation:  2 - BP+/- 20mmHg of normal  Consciousness:  2 - Fully awake  Oxygen Saturation (color):  2 - Able to maintain oxygen saturation >92% on room air    Sedation/Anesthesia Plan:  Guard the patient's safety and welfare.  Minimize physical discomfort and pain.  Minimize negative psychological responses to treatment by providing sedation and analgesia and maximize the potential amnesia.  Patient to meet pre-procedure discharge plan.    Medication Planned:  midazolam intravenously and fentanyl intravenously    Patient is an appropriate candidate for plan of sedation:   yes      Electronically signed by Javi Mixon MD on 3/8/2024 at 9:44 AM

## 2024-03-09 LAB
ANION GAP SERPL CALCULATED.3IONS-SCNC: 12 MMOL/L (ref 3–16)
BUN SERPL-MCNC: 18 MG/DL (ref 7–20)
CALCIUM SERPL-MCNC: 9.1 MG/DL (ref 8.3–10.6)
CHLORIDE SERPL-SCNC: 103 MMOL/L (ref 99–110)
CO2 SERPL-SCNC: 22 MMOL/L (ref 21–32)
CREAT SERPL-MCNC: 0.8 MG/DL (ref 0.9–1.3)
DEPRECATED RDW RBC AUTO: 14.6 % (ref 12.4–15.4)
EKG ATRIAL RATE: 89 BPM
EKG DIAGNOSIS: NORMAL
EKG P AXIS: 21 DEGREES
EKG P-R INTERVAL: 234 MS
EKG Q-T INTERVAL: 414 MS
EKG QRS DURATION: 106 MS
EKG QTC CALCULATION (BAZETT): 503 MS
EKG R AXIS: 45 DEGREES
EKG T AXIS: 85 DEGREES
EKG VENTRICULAR RATE: 89 BPM
GFR SERPLBLD CREATININE-BSD FMLA CKD-EPI: >60 ML/MIN/{1.73_M2}
GLUCOSE SERPL-MCNC: 105 MG/DL (ref 70–99)
HCT VFR BLD AUTO: 46.7 % (ref 40.5–52.5)
HGB BLD-MCNC: 15.5 G/DL (ref 13.5–17.5)
MCH RBC QN AUTO: 30.5 PG (ref 26–34)
MCHC RBC AUTO-ENTMCNC: 33.3 G/DL (ref 31–36)
MCV RBC AUTO: 91.8 FL (ref 80–100)
PLATELET # BLD AUTO: 206 K/UL (ref 135–450)
PMV BLD AUTO: 8.8 FL (ref 5–10.5)
POTASSIUM SERPL-SCNC: 4.2 MMOL/L (ref 3.5–5.1)
RBC # BLD AUTO: 5.09 M/UL (ref 4.2–5.9)
SODIUM SERPL-SCNC: 137 MMOL/L (ref 136–145)
WBC # BLD AUTO: 8.9 K/UL (ref 4–11)

## 2024-03-09 PROCEDURE — 93010 ELECTROCARDIOGRAM REPORT: CPT | Performed by: INTERNAL MEDICINE

## 2024-03-09 PROCEDURE — 6370000000 HC RX 637 (ALT 250 FOR IP): Performed by: INTERNAL MEDICINE

## 2024-03-09 PROCEDURE — 85027 COMPLETE CBC AUTOMATED: CPT

## 2024-03-09 PROCEDURE — 6360000002 HC RX W HCPCS

## 2024-03-09 PROCEDURE — 36415 COLL VENOUS BLD VENIPUNCTURE: CPT

## 2024-03-09 PROCEDURE — 2580000003 HC RX 258: Performed by: INTERNAL MEDICINE

## 2024-03-09 PROCEDURE — 80048 BASIC METABOLIC PNL TOTAL CA: CPT

## 2024-03-09 PROCEDURE — 6360000002 HC RX W HCPCS: Performed by: INTERNAL MEDICINE

## 2024-03-09 PROCEDURE — 99233 SBSQ HOSP IP/OBS HIGH 50: CPT

## 2024-03-09 PROCEDURE — 2060000000 HC ICU INTERMEDIATE R&B

## 2024-03-09 PROCEDURE — 6370000000 HC RX 637 (ALT 250 FOR IP)

## 2024-03-09 RX ORDER — SPIRONOLACTONE 25 MG/1
12.5 TABLET ORAL DAILY
Status: DISCONTINUED | OUTPATIENT
Start: 2024-03-10 | End: 2024-03-11 | Stop reason: HOSPADM

## 2024-03-09 RX ORDER — METOPROLOL SUCCINATE 25 MG/1
25 TABLET, EXTENDED RELEASE ORAL 2 TIMES DAILY
Status: DISCONTINUED | OUTPATIENT
Start: 2024-03-09 | End: 2024-03-11 | Stop reason: HOSPADM

## 2024-03-09 RX ORDER — SPIRONOLACTONE 25 MG/1
12.5 TABLET ORAL DAILY
Qty: 30 TABLET | Refills: 3 | Status: SHIPPED | OUTPATIENT
Start: 2024-03-10

## 2024-03-09 RX ORDER — ASPIRIN 81 MG/1
81 TABLET ORAL DAILY
Qty: 30 TABLET | Refills: 0 | Status: SHIPPED | OUTPATIENT
Start: 2024-03-10 | End: 2024-04-09

## 2024-03-09 RX ORDER — FUROSEMIDE 10 MG/ML
20 INJECTION INTRAMUSCULAR; INTRAVENOUS ONCE
Status: COMPLETED | OUTPATIENT
Start: 2024-03-09 | End: 2024-03-09

## 2024-03-09 RX ORDER — AMIODARONE HYDROCHLORIDE 200 MG/1
200 TABLET ORAL 2 TIMES DAILY
Qty: 60 TABLET | Refills: 0 | Status: SHIPPED | OUTPATIENT
Start: 2024-03-09 | End: 2024-03-10 | Stop reason: HOSPADM

## 2024-03-09 RX ORDER — METOPROLOL SUCCINATE 50 MG/1
50 TABLET, EXTENDED RELEASE ORAL 2 TIMES DAILY
Qty: 30 TABLET | Refills: 3 | Status: SHIPPED | OUTPATIENT
Start: 2024-03-09 | End: 2024-03-10 | Stop reason: HOSPADM

## 2024-03-09 RX ORDER — ATORVASTATIN CALCIUM 40 MG/1
40 TABLET, FILM COATED ORAL DAILY
Qty: 30 TABLET | Refills: 3 | Status: SHIPPED | OUTPATIENT
Start: 2024-03-10

## 2024-03-09 RX ORDER — FUROSEMIDE 20 MG/1
20 TABLET ORAL DAILY
Status: DISCONTINUED | OUTPATIENT
Start: 2024-03-10 | End: 2024-03-11 | Stop reason: HOSPADM

## 2024-03-09 RX ORDER — FUROSEMIDE 20 MG/1
20 TABLET ORAL DAILY
Qty: 60 TABLET | Refills: 3 | Status: SHIPPED | OUTPATIENT
Start: 2024-03-10

## 2024-03-09 RX ADMIN — SACUBITRIL AND VALSARTAN 1 TABLET: 24; 26 TABLET, FILM COATED ORAL at 20:15

## 2024-03-09 RX ADMIN — FUROSEMIDE 20 MG: 10 INJECTION, SOLUTION INTRAMUSCULAR; INTRAVENOUS at 08:05

## 2024-03-09 RX ADMIN — ASPIRIN 81 MG: 81 TABLET, COATED ORAL at 08:05

## 2024-03-09 RX ADMIN — TICAGRELOR 90 MG: 90 TABLET ORAL at 20:14

## 2024-03-09 RX ADMIN — APIXABAN 5 MG: 5 TABLET, FILM COATED ORAL at 08:05

## 2024-03-09 RX ADMIN — SPIRONOLACTONE 25 MG: 25 TABLET ORAL at 08:05

## 2024-03-09 RX ADMIN — AMIODARONE HYDROCHLORIDE 200 MG: 200 TABLET ORAL at 08:05

## 2024-03-09 RX ADMIN — Medication 10 ML: at 20:15

## 2024-03-09 RX ADMIN — SACUBITRIL AND VALSARTAN 1 TABLET: 24; 26 TABLET, FILM COATED ORAL at 11:52

## 2024-03-09 RX ADMIN — APIXABAN 5 MG: 5 TABLET, FILM COATED ORAL at 20:14

## 2024-03-09 RX ADMIN — AMIODARONE HYDROCHLORIDE 200 MG: 200 TABLET ORAL at 22:41

## 2024-03-09 RX ADMIN — FUROSEMIDE 20 MG: 10 INJECTION, SOLUTION INTRAMUSCULAR; INTRAVENOUS at 18:05

## 2024-03-09 RX ADMIN — METOPROLOL SUCCINATE 25 MG: 25 TABLET, FILM COATED, EXTENDED RELEASE ORAL at 22:41

## 2024-03-09 RX ADMIN — METOPROLOL SUCCINATE 50 MG: 50 TABLET, EXTENDED RELEASE ORAL at 08:05

## 2024-03-09 RX ADMIN — TICAGRELOR 90 MG: 90 TABLET ORAL at 08:05

## 2024-03-09 RX ADMIN — EMPAGLIFLOZIN 10 MG: 10 TABLET, FILM COATED ORAL at 08:06

## 2024-03-09 RX ADMIN — Medication 10 ML: at 08:05

## 2024-03-09 RX ADMIN — DEXTROSE MONOHYDRATE 0.5 MG/MIN: 50 INJECTION, SOLUTION INTRAVENOUS at 05:11

## 2024-03-09 RX ADMIN — ATORVASTATIN CALCIUM 40 MG: 40 TABLET, FILM COATED ORAL at 08:05

## 2024-03-09 ASSESSMENT — PAIN SCALES - GENERAL
PAINLEVEL_OUTOF10: 0

## 2024-03-09 NOTE — FLOWSHEET NOTE
Cardiology on-call, Dr. Cook, paged d/t pt's BP being 90/68, he is on an amio gtt, and has scheduled po amio and toprol xl.  Awaiting return phone call.    Addendum:  Medications will be held tonight per Dr. Cook.

## 2024-03-10 LAB
ANION GAP SERPL CALCULATED.3IONS-SCNC: 12 MMOL/L (ref 3–16)
BUN SERPL-MCNC: 19 MG/DL (ref 7–20)
CALCIUM SERPL-MCNC: 9 MG/DL (ref 8.3–10.6)
CHLORIDE SERPL-SCNC: 104 MMOL/L (ref 99–110)
CO2 SERPL-SCNC: 22 MMOL/L (ref 21–32)
CREAT SERPL-MCNC: 0.8 MG/DL (ref 0.9–1.3)
EKG ATRIAL RATE: 75 BPM
EKG DIAGNOSIS: NORMAL
EKG Q-T INTERVAL: 408 MS
EKG QRS DURATION: 106 MS
EKG QTC CALCULATION (BAZETT): 515 MS
EKG R AXIS: 39 DEGREES
EKG T AXIS: 12 DEGREES
EKG VENTRICULAR RATE: 96 BPM
GFR SERPLBLD CREATININE-BSD FMLA CKD-EPI: >60 ML/MIN/{1.73_M2}
GLUCOSE SERPL-MCNC: 93 MG/DL (ref 70–99)
POTASSIUM SERPL-SCNC: 4.2 MMOL/L (ref 3.5–5.1)
SODIUM SERPL-SCNC: 138 MMOL/L (ref 136–145)

## 2024-03-10 PROCEDURE — 6370000000 HC RX 637 (ALT 250 FOR IP)

## 2024-03-10 PROCEDURE — 93005 ELECTROCARDIOGRAM TRACING: CPT

## 2024-03-10 PROCEDURE — 6370000000 HC RX 637 (ALT 250 FOR IP): Performed by: INTERNAL MEDICINE

## 2024-03-10 PROCEDURE — 80048 BASIC METABOLIC PNL TOTAL CA: CPT

## 2024-03-10 PROCEDURE — 99232 SBSQ HOSP IP/OBS MODERATE 35: CPT

## 2024-03-10 PROCEDURE — 93010 ELECTROCARDIOGRAM REPORT: CPT | Performed by: INTERNAL MEDICINE

## 2024-03-10 PROCEDURE — 2580000003 HC RX 258: Performed by: INTERNAL MEDICINE

## 2024-03-10 PROCEDURE — 36415 COLL VENOUS BLD VENIPUNCTURE: CPT

## 2024-03-10 PROCEDURE — 2060000000 HC ICU INTERMEDIATE R&B

## 2024-03-10 RX ORDER — METOPROLOL SUCCINATE 25 MG/1
25 TABLET, EXTENDED RELEASE ORAL 2 TIMES DAILY
Qty: 30 TABLET | Refills: 3 | Status: SHIPPED | OUTPATIENT
Start: 2024-03-10

## 2024-03-10 RX ORDER — AMIODARONE HYDROCHLORIDE 200 MG/1
200 TABLET ORAL DAILY
Status: DISCONTINUED | OUTPATIENT
Start: 2024-03-10 | End: 2024-03-10

## 2024-03-10 RX ORDER — AMIODARONE HYDROCHLORIDE 200 MG/1
400 TABLET ORAL 2 TIMES DAILY
Status: DISCONTINUED | OUTPATIENT
Start: 2024-03-10 | End: 2024-03-11 | Stop reason: HOSPADM

## 2024-03-10 RX ORDER — AMIODARONE HYDROCHLORIDE 200 MG/1
200 TABLET ORAL DAILY
Qty: 30 TABLET | Refills: 1 | Status: SHIPPED
Start: 2024-03-10 | End: 2024-03-11 | Stop reason: HOSPADM

## 2024-03-10 RX ADMIN — SACUBITRIL AND VALSARTAN 1 TABLET: 24; 26 TABLET, FILM COATED ORAL at 20:26

## 2024-03-10 RX ADMIN — Medication 10 ML: at 20:26

## 2024-03-10 RX ADMIN — AMIODARONE HYDROCHLORIDE 400 MG: 200 TABLET ORAL at 20:26

## 2024-03-10 RX ADMIN — EMPAGLIFLOZIN 10 MG: 10 TABLET, FILM COATED ORAL at 08:47

## 2024-03-10 RX ADMIN — SPIRONOLACTONE 12.5 MG: 25 TABLET ORAL at 08:47

## 2024-03-10 RX ADMIN — ATORVASTATIN CALCIUM 40 MG: 40 TABLET, FILM COATED ORAL at 08:47

## 2024-03-10 RX ADMIN — TICAGRELOR 90 MG: 90 TABLET ORAL at 08:47

## 2024-03-10 RX ADMIN — FUROSEMIDE 20 MG: 20 TABLET ORAL at 08:47

## 2024-03-10 RX ADMIN — SACUBITRIL AND VALSARTAN 1 TABLET: 24; 26 TABLET, FILM COATED ORAL at 08:47

## 2024-03-10 RX ADMIN — ASPIRIN 81 MG: 81 TABLET, COATED ORAL at 08:47

## 2024-03-10 RX ADMIN — Medication 10 ML: at 08:47

## 2024-03-10 RX ADMIN — APIXABAN 5 MG: 5 TABLET, FILM COATED ORAL at 08:47

## 2024-03-10 RX ADMIN — METOPROLOL SUCCINATE 25 MG: 25 TABLET, FILM COATED, EXTENDED RELEASE ORAL at 08:47

## 2024-03-10 RX ADMIN — METOPROLOL SUCCINATE 25 MG: 25 TABLET, FILM COATED, EXTENDED RELEASE ORAL at 20:26

## 2024-03-10 RX ADMIN — APIXABAN 5 MG: 5 TABLET, FILM COATED ORAL at 20:26

## 2024-03-10 RX ADMIN — AMIODARONE HYDROCHLORIDE 200 MG: 200 TABLET ORAL at 08:47

## 2024-03-10 RX ADMIN — TICAGRELOR 90 MG: 90 TABLET ORAL at 20:26

## 2024-03-10 ASSESSMENT — PAIN SCALES - GENERAL
PAINLEVEL_OUTOF10: 0

## 2024-03-11 ENCOUNTER — APPOINTMENT (OUTPATIENT)
Dept: CARDIAC CATH/INVASIVE PROCEDURES | Age: 59
DRG: 321 | End: 2024-03-11
Payer: COMMERCIAL

## 2024-03-11 VITALS
TEMPERATURE: 97.9 F | OXYGEN SATURATION: 100 % | HEART RATE: 62 BPM | RESPIRATION RATE: 18 BRPM | HEIGHT: 74 IN | BODY MASS INDEX: 29.03 KG/M2 | DIASTOLIC BLOOD PRESSURE: 80 MMHG | WEIGHT: 226.2 LBS | SYSTOLIC BLOOD PRESSURE: 101 MMHG

## 2024-03-11 PROCEDURE — 2500000003 HC RX 250 WO HCPCS

## 2024-03-11 PROCEDURE — 6370000000 HC RX 637 (ALT 250 FOR IP): Performed by: INTERNAL MEDICINE

## 2024-03-11 PROCEDURE — 92960 CARDIOVERSION ELECTRIC EXT: CPT | Performed by: INTERNAL MEDICINE

## 2024-03-11 PROCEDURE — 92960 CARDIOVERSION ELECTRIC EXT: CPT

## 2024-03-11 PROCEDURE — 2580000003 HC RX 258: Performed by: INTERNAL MEDICINE

## 2024-03-11 PROCEDURE — 6370000000 HC RX 637 (ALT 250 FOR IP)

## 2024-03-11 PROCEDURE — 5A2204Z RESTORATION OF CARDIAC RHYTHM, SINGLE: ICD-10-PCS | Performed by: INTERNAL MEDICINE

## 2024-03-11 PROCEDURE — 99232 SBSQ HOSP IP/OBS MODERATE 35: CPT | Performed by: NURSE PRACTITIONER

## 2024-03-11 RX ORDER — SODIUM CHLORIDE 9 MG/ML
INJECTION, SOLUTION INTRAVENOUS PRN
Status: DISCONTINUED | OUTPATIENT
Start: 2024-03-11 | End: 2024-03-11 | Stop reason: HOSPADM

## 2024-03-11 RX ORDER — SODIUM CHLORIDE 0.9 % (FLUSH) 0.9 %
5-40 SYRINGE (ML) INJECTION PRN
Status: DISCONTINUED | OUTPATIENT
Start: 2024-03-11 | End: 2024-03-11 | Stop reason: HOSPADM

## 2024-03-11 RX ORDER — SODIUM CHLORIDE 0.9 % (FLUSH) 0.9 %
5-40 SYRINGE (ML) INJECTION EVERY 12 HOURS SCHEDULED
Status: DISCONTINUED | OUTPATIENT
Start: 2024-03-11 | End: 2024-03-11 | Stop reason: HOSPADM

## 2024-03-11 RX ORDER — AMIODARONE HYDROCHLORIDE 400 MG/1
TABLET ORAL
Qty: 37 TABLET | Refills: 0 | Status: SHIPPED | OUTPATIENT
Start: 2024-03-11 | End: 2024-05-17

## 2024-03-11 RX ADMIN — TICAGRELOR 90 MG: 90 TABLET ORAL at 08:02

## 2024-03-11 RX ADMIN — SPIRONOLACTONE 12.5 MG: 25 TABLET ORAL at 08:02

## 2024-03-11 RX ADMIN — APIXABAN 5 MG: 5 TABLET, FILM COATED ORAL at 08:02

## 2024-03-11 RX ADMIN — ATORVASTATIN CALCIUM 40 MG: 40 TABLET, FILM COATED ORAL at 08:03

## 2024-03-11 RX ADMIN — Medication 10 ML: at 08:03

## 2024-03-11 RX ADMIN — ASPIRIN 81 MG: 81 TABLET, COATED ORAL at 08:02

## 2024-03-11 RX ADMIN — FUROSEMIDE 20 MG: 20 TABLET ORAL at 08:03

## 2024-03-11 RX ADMIN — SACUBITRIL AND VALSARTAN 1 TABLET: 24; 26 TABLET, FILM COATED ORAL at 08:02

## 2024-03-11 RX ADMIN — METOPROLOL SUCCINATE 25 MG: 25 TABLET, FILM COATED, EXTENDED RELEASE ORAL at 08:02

## 2024-03-11 RX ADMIN — AMIODARONE HYDROCHLORIDE 400 MG: 200 TABLET ORAL at 08:03

## 2024-03-11 RX ADMIN — EMPAGLIFLOZIN 10 MG: 10 TABLET, FILM COATED ORAL at 08:03

## 2024-03-11 ASSESSMENT — PAIN SCALES - GENERAL: PAINLEVEL_OUTOF10: 0

## 2024-03-11 NOTE — PROGRESS NOTES
[facility]     Electrophysiology                                     Progress Note    Admission date:  3/3/2024    Reason for follow up visit: Atrial fibrillation    HPI/CC: Weston Edmond was admitted on 3/3/2024 with decompensated heart failure.  Patient presented with shortness of breath, lower extremity edema and increasing abdominal girth.  proBNP 6407.  Chest x-ray reveals mild cardiomegaly with pulmonary vascular congestion.  He was also noted to have elevated liver enzymes.  ECG revealed rapid atrial fibrillation.  Patient was placed on diltiazem drip. Of note,  Patient was recently seen in EP clinic 2/15/2024 for atrial fibrillation.  At that time his heart rate was 128 bpm.  He was scheduled for atrial fibrillation 3/15/202.  3/4/2024 EP consulted. Diltiazem drip discontinued.  Metoprolol reinitiated.  Eliquis started.  Echo ordered.    Rhythm has been atrial fibrillation rate 105.        Subjective: Patient denies chest pain, shortness of breath and palpitations.  Patient does have lower extremity pitting edema.  He also stated he has some abdominal swelling but is improving.  His weight today is 242 which is down 8 pounds from yesterday.   He is net volume -4395 ml    Vitals:  Blood pressure (!) 123/92, pulse 92, temperature 98.6 °F (37 °C), temperature source Oral, resp. rate 18, height 1.88 m (6' 2\"), weight 109.8 kg (242 lb), SpO2 98 %.  Temp  Av.3 °F (36.8 °C)  Min: 97.7 °F (36.5 °C)  Max: 98.6 °F (37 °C)  Pulse  Av.1  Min: 70  Max: 106  BP  Min: 109/74  Max: 123/92  SpO2  Av.2 %  Min: 92 %  Max: 100 %    24 hour I/O    Intake/Output Summary (Last 24 hours) at 3/6/2024 1002  Last data filed at 3/6/2024 0940  Gross per 24 hour   Intake 1080 ml   Output 5400 ml   Net -4320 ml       Current Facility-Administered Medications   Medication Dose Route Frequency Provider Last Rate Last Admin    digoxin (LANOXIN) tablet 125 mcg  125 mcg Oral Daily Sylvia Philip, APRN - CNP   
    [facility]     Electrophysiology                                     Progress Note    Admission date:  3/3/2024    Reason for follow up visit: Atrial fibrillation    HPI/CC: Weston Edmond was admitted on 3/3/2024 with decompensated heart failure.  Patient presented with shortness of breath, lower extremity edema and increasing abdominal girth.  proBNP 6407.  Chest x-ray reveals mild cardiomegaly with pulmonary vascular congestion.  He was also noted to have elevated liver enzymes.  ECG revealed rapid atrial fibrillation.  Patient was placed on diltiazem drip. Of note,  Patient was recently seen in EP clinic 2/15/2024 for atrial fibrillation.  At that time his heart rate was 128 bpm.  He was scheduled for atrial fibrillation 3/15/202.  3/4/2024 EP consulted. Diltiazem drip discontinued.  Metoprolol reinitiated.  Eliquis started.  Echo ordered.3/6 LVEF 20-25%.  Jardiance initiated. 3/7 Left heart catheterization-left circumflex and RCA disease/ALICIA/DCCV-converted to sinus rhythm for approximately 1 minute then went back into atrial fibrillation and attempted to shock another time without success; amiodarone bolus and drip started post cardiac cath due to heart rates maintaining 140s. 3/8 Patient converted to sinus rhythm at 5:26 AM; PCI left circumflex and RCA.     Rhythm has been sinus rhythm rate 90      Subjective: Patient denies chest pain, shortness of breath and palpitations.  Patient does have trace  lower extremity pitting edema .  He also stated his abdominal swelling has improved greatly.  His weight today is 230 which is up 1 pound from yesterday.  And a total of 17 pounds from admission.  he is net volume - 11, 812 ml  Patient's is in sinus rhythm on amiodarone drip 0.5 mg/min.  Will likely discontinue amiodarone drip later this afternoon.  Will reevaluate patient heart rate and volume status at that time.  Long discussion with patient and wife regarding GDMT for heart failure and 
    [facility]     Electrophysiology                                     Progress Note    Admission date:  3/3/2024    Reason for follow up visit: Atrial fibrillation    HPI/CC: Weston Edmond was admitted on 3/3/2024 with decompensated heart failure.  Patient presented with shortness of breath, lower extremity edema and increasing abdominal girth.  proBNP 6407.  Chest x-ray reveals mild cardiomegaly with pulmonary vascular congestion.  He was also noted to have elevated liver enzymes.  ECG revealed rapid atrial fibrillation.  Patient was placed on diltiazem drip. Of note,  Patient was recently seen in EP clinic 2/15/2024 for atrial fibrillation.  At that time his heart rate was 128 bpm.  He was scheduled for atrial fibrillation 3/15/202.  3/4/2024 EP consulted. Diltiazem drip discontinued.  Metoprolol reinitiated.  Eliquis started.  Echo ordered.3/6 LVEF 20-25%.  Jardiance initiated. 3/7 Left heart catheterization/ALICIA/DCCV    Rhythm has been atrial fibrillation rate 130 up to 140-160 with activity      Subjective: Patient denies chest pain, shortness of breath and palpitations.  Patient does have lower extremity pitting edema which is improving.  He also stated his abdominal swelling has improved greatly.  His weight today is 234 which is down 8 pounds from yesterday.  And a total of 13 pounds from admission.  he is net volume -8210 ml.   Patient's heart rates are more elevated today.  Maintaining 130s at rest with going up to the 140s to 160s with activity.  Discussed with Dr. Alex.  Will arrange ALICIA cardioversion with his left heart catheterization today    Vitals:  Blood pressure 104/84, pulse (!) 103, temperature 98.1 °F (36.7 °C), temperature source Oral, resp. rate 18, height 1.88 m (6' 2\"), weight 106.1 kg (234 lb), SpO2 93 %.  Temp  Av.4 °F (36.9 °C)  Min: 97.9 °F (36.6 °C)  Max: 99.1 °F (37.3 °C)  Pulse  Av.1  Min: 92  Max: 175  BP  Min: 104/84  Max: 139/88  SpO2  Av.5 %  
    [facility]     Electrophysiology                                     Progress Note    Admission date:  3/3/2024    Reason for follow up visit: Atrial fibrillation    HPI/CC: Weston Edmond was admitted on 3/3/2024 with decompensated heart failure.  Patient presented with shortness of breath, lower extremity edema and increasing abdominal girth.  proBNP 6407.  Chest x-ray reveals mild cardiomegaly with pulmonary vascular congestion.  He was also noted to have elevated liver enzymes.  ECG revealed rapid atrial fibrillation.  Patient was placed on diltiazem drip. Of note,  Patient was recently seen in EP clinic 2/15/2024 for atrial fibrillation.  At that time his heart rate was 128 bpm.  He was scheduled for atrial fibrillation 3/15/202.  3/4/2024 EP consulted. Diltiazem drip discontinued.  Metoprolol reinitiated.  Eliquis started.  Echo ordered.3/6 LVEF 20-25%.  Jardiance initiated. 3/7 Left heart catheterization-left circumflex and RCA disease/ALICIA/DCCV-converted to sinus rhythm for approximately 1 minute then went back into atrial fibrillation and attempted to shock another time without success; amiodarone bolus and drip started post cardiac cath due to heart rates maintaining 140s. 3/8 Patient converted to sinus rhythm at 5:26 AM; PCI today    Rhythm has been sinus rhythm rate 90      Subjective: Patient denies chest pain, shortness of breath and palpitations.  Patient does have trace - +1 lower extremity pitting edema which is improving.  He also stated his abdominal swelling has improved greatly.  His weight today is 229 which is down 5 pounds from yesterday.  And a total of 18 pounds from admission.  he is net volume -10,312 ml.   Patient's is in sinus rhythm on amiodarone drip 0.5 mg/min.  .  Discussed with Dr. Alex, Dr Noe and Dr Mixon.  Will arrange for patient to have PCI today with Dr Mixon    Vitals:  Blood pressure 100/80, pulse 95, temperature 98.1 °F (36.7 °C), temperature source 
  Patient briefly seen and examined at the request of electrophysiology team.  He has new severe cardiomyopathy and he is admitted with congestive heart failure as well as atrial fibrillation with uncontrolled heart rate.  EP is managing rate control for A-fib.  New severe cardiomyopathy warrants ischemic evaluation with a coronary angiogram.  I spoke to patient at length about the nature of the procedure as well as indications.  He seems to be full agreement with the plan to proceed with angiogram tomorrow.  Will plan to keep patient n.p.o., hold Eliquis dose tonight and tomorrow morning and bridge him with IV heparin starting 9 PM tonight.  Will plan for coronary angiogram tomorrow morning.    Please call with questions.    Marvin Noe MD, FACC, [facility]  Interventional Cardiology  [facility]  [phone] (c)    
  Physician Progress Note      PATIENT:               JORDAN ZHAO  CSN #:                  [id number]  :                       1965  ADMIT DATE:       3/3/2024 8:29 PM  DISCH DATE:  RESPONDING  PROVIDER #:        Jagdeep Gracia MD          QUERY TEXT:    Pt admitted with AFIB  and has acute CHF documented. If possible, please   document in progress notes and discharge summary further specificity regarding   the type and acuity of CHF:      The medical record reflects the following:  Risk Factors: AFIB, HTN, ANSHU  Clinical Indicators:  PN -  \"Acute CHF.  F/u TTE.  Furosemide IV.  Metoprolol as above. \"  ECHO -  \"Difficult to accurately assess left ventricular systolic function due to  arrhythmia but appears severely reduced with an ejection fraction of 20 - 25  %.  There is global hypokinesis with regional variations.  Left ventricular diastolic filling pressure is elevated.  Changes noted from previous echo on 2023 in left ventricular function.  Mild mitral and pulmonic regurgitation.  The left atrium is moderately dilated.  Bi-atrial enlargement.  Right ventricular systolic function is mild to moderately reduced .\"  Treatment: Cardiology consult, BB, IV lasix, ECHO, serial labs, supportive   care    Thank you,  Georgia Cruz, RN, BSN, CRCR  Options provided:  -- Acute on Chronic Systolic CHF/HFrEF  -- Acute on Chronic Diastolic CHF/HFpEF  -- Acute on Chronic Systolic and Diastolic CHF  -- Acute Systolic CHF/HFrEF  -- Acute Diastolic CHF/HFpEF  -- Acute Systolic and Diastolic CHF  -- Other - I will add my own diagnosis  -- Disagree - Not applicable / Not valid  -- Disagree - Clinically unable to determine / Unknown  -- Refer to Clinical Documentation Reviewer    PROVIDER RESPONSE TEXT:    This patient is in acute on chronic systolic CHF/HFrEF.    Query created by: Georgia Cruz on 3/7/2024 8:55 AM      Electronically signed by:  Jagdeep Gracia MD 3/7/2024 12:54 PM          
0650 pt with 6 beat run of vtach, resting with eyes closed.states \"I feel fine\". Asymptomatic    
4 Eyes Skin Assessment     The patient is being assess for  Admission    I agree that 2 RN's have performed a thorough Head to Toe Skin Assessment on the patient. ALL assessment sites listed below have been assessed.       Areas assessed by both nurses: Vilma MAC  [x]   Head, Face, and Ears   [x]   Shoulders, Back, and Chest  [x]   Arms, Elbows, and Hands   [x]   Coccyx, Sacrum, and Ischum  [x]   Legs, Feet, and Heels        Does the Patient have Skin Breakdown?  No         Varun Prevention initiated:  No   Wound Care Orders initiated:  No      [facility] nurse consulted for Pressure Injury (Stage 3,4, Unstageable, DTI, NWPT, and Complex wounds):  No      Nurse 1 eSignature: Electronically signed by Noam Rg RN on 3/4/24 at 4:26 AM EST    **SHARE this note so that the co-signing nurse is able to place an eSignature**    Nurse 2 eSignature: {Esignature:[id number]}            
Ambulated pt in jimenez appx 150 ft with pulse ox. HR fluctuated between  bpm. No c/o or signs of SOB, maintained pulse ox of 96-99%. Pt denied lightheaded or dizziness.   
CATH LAB PROCEDURE LOG - CARDIOVERSION      PRE Procedure:    ARRIVAL TIME:  1130    Pt arrived to Cath Lab.   Plan of Care:   Hemodynamics and cardiac rhythm will remain stable.   Comfort level will be maintained.   Respiratory function will remain adequate.   Pt/family will verbalize understanding of the procedure.   Procedure will be tolerated without complications.   Patient will recover from procedure without complications.   ID armband on patient and identification verified.   Informed consent obtained.   Non invasive blood pressure cuff applied, monitoring initiated.   EKG pads and pulse oximeter applied, monitoring initiated.   Instructions given. Patient and / or family verbalize understanding.   H&P will be documented by physician in Epic.     Pt has been NPO since midnight.     Post DCCV EKG ordered? Yes      Cardioversion:    Timeout and Fire Safety completed at 1:01 PM.     Correct patient verified.   Members of the surgical team/visitors introduced.   Allergies announced.   Correct procedure verified.   Correct procedure site verified.   Consents verified.   Implant equipment, additional services, special requirements available.   Medications labeled and available.   Appropriate pre medications have been administered.     Alcohol prep solution had sufficient time to dissipate if used. Yes=1, No=0  Surgical site or incision above the xyphoid. Yes=1, No=0  Open oxygen source. Yes=1, No=0  Available ignition source. Yes=1, No=0  Score total - 1.     Procedure Medication:     1:02 PM - Brevital 50 mg IV given by Dr Barakat  1304pm   Brevital 30 mg IV given by Dr Barakat.      Synchronized Cardioversion performed at 200 joules  1:05 PM Rhythm Did not convert. Pt remains sedated for CV. Synchronized Cardioversion performed at 300 joules.   1:08 PM Rhythm was converted to  normal sinus      Defibrillator pads removed, skin intact.     Post EKG completed.     1:11 PM Pt waking up, respirations spontaneous, 
Consent for LHC obtained and placed in patient's paper chart.  
Hospital Medicine Progress Note        Subjective:  Consistently tachycardic since 7:30 AM.  Asymptomatic.  No chest pain, dyspnea, or palpitations.  Still edematous, down 9 lbs since yesterday.      General appearance: No apparent distress, appears stated age and cooperative.  HEENT: Pupils equal, round.  Conjunctivae/corneas clear.  Neck: No jugular venous distention.   Respiratory:  Normal respiratory effort.  Bilaterally without Rales/Wheezes/Rhonchi.  Cardiovascular: Normal rate and irregular rhythm with normal S1/S2 without murmurs, rubs or gallops.  Abdomen: Soft, non-tender, non-distended with normal bowel sounds.  Musculoskeletal: No cyanosis bilaterally.  2+ BLE pitting edema.  Without deformity.  Skin: No jaundice.  No rashes or lesions.  Neurologic:  Neurovascularly intact without any focal sensory/motor deficits. Cranial nerves: II-XII intact, grossly non-focal.  Psychiatric: Alert and oriented, normal insight.      Assessment and Plan:       58 Y M with a h/o HTN, HLD, PAF, and ANSHU presented with dyspnea, BLE edema, and RVR.      PAF with RVR.  Cardiology stopped dilt gtt, increased his PO metoprolol and started digoxin.  Ablation 3/15.  - started apixaban  - LFT elevation likely due to congestive hepatopathy.  F/u RUQ US.    Acute CHF.  F/u TTE.  Furosemide IV.  Metoprolol as above.      ANSHU.  He recently got a CPAP from Dr. Rossi but he doesn't tolerate it, didn't bring it here.  F/u as outpatient.       Diet: ADULT DIET; Regular; Low Sodium (2 gm); 2000 ml  DVT Prophylaxis: anticoagulation as above  Appropriate for A1: no  Disposition: PT/OT not indicated.  Home when adequately diuresed and when HR controlled with PO meds.  Perhaps 3/7 - 3/8, pending cardiology input.      -----------------------------------------------------------------------                              PCP: Mia Alberto PA    Date of Admission: 3/3/2024  Current Hospital Day: 4    Chief Admission Complaint: Shortness of 
Hospital Medicine Progress Note        Subjective:  Doing well.  No chest pain.  No dyspnea.       General appearance: No apparent distress, appears stated age and cooperative.  HEENT: Pupils equal, round.  Conjunctivae/corneas clear.  Neck: No jugular venous distention.   Respiratory:  Normal respiratory effort.  Bilaterally without Rales/Wheezes/Rhonchi.  Cardiovascular: Normal rate and regular rhythm with normal S1/S2 without murmurs, rubs or gallops.  Abdomen: Soft, non-tender, non-distended with normal bowel sounds.  Musculoskeletal: No cyanosis bilaterally.  No further BLE pitting edema.  Without deformity.  Skin: No jaundice.  No rashes or lesions.  Neurologic:  Neurovascularly intact without any focal sensory/motor deficits. Cranial nerves: II-XII intact, grossly non-focal.  Psychiatric: Alert and oriented, normal insight.      Assessment and Plan:       58 Y M with a h/o HTN, HLD, PAF, and ANSHU presented with dyspnea, BLE edema, and RVR.      PAF with RVR.  Cardiology stopped dilt gtt, increased his PO metoprolol.  Started and then stopped digoxin.  ALICIA OK on 3/7 but cardioversion only sustained sinus for 30 seconds.  EP started amiodarone, then he converted to sinus.  Ablation planned for 3/15.  - started apixaban    Acute systolic CHF.  TTE showed EF was down to 20-25% with RWMAs, DD, and RV failure.  EF had been 50-55% in 9/2023.  Furosemide IV, then changed to PO.  Started Entresto.  Metoprolol as above.  Empagliflozin, spironolactone.    CAD.  LHC 3/7 showed 70% D2, 80% LCx/OM2, and 80% prox RCA.  LHC repeated 3/8, required LOKESH to LCx and DESx2 to RCA.  Aspirin for 30 days, ticagrelor indefinitely, statin, metoprolol.    LFTs elevated due to congestive hepatopathy.  Diuresis as above.     ANSHU.  He recently got a CPAP from Dr. Rossi but he doesn't tolerate it, didn't bring it here.  F/u as outpatient.       Diet: ADULT DIET; Regular  DVT Prophylaxis: anticoagulation as above  Appropriate for A1: 
Hospital Medicine Progress Note        Subjective:  He feel OK.  No chest pain, no dyspnea, no palpitations, no lightheadedness.  In good spirits.  Awaiting another cardioversion attempt this afternoon.      General appearance: No apparent distress, appears stated age and cooperative.  HEENT: Pupils equal, round.  Conjunctivae/corneas clear.  Neck: No jugular venous distention.   Respiratory:  Normal respiratory effort.  Bilaterally without Rales/Wheezes/Rhonchi.  Cardiovascular: Normal rate and irregular rhythm with normal S1/S2 without murmurs, rubs or gallops.  Abdomen: Soft, non-tender, non-distended with normal bowel sounds.  Musculoskeletal: No cyanosis bilaterally.  No further BLE pitting edema.  Without deformity.  Skin: No jaundice.  No rashes or lesions.  Neurologic:  Neurovascularly intact without any focal sensory/motor deficits. Cranial nerves: II-XII intact, grossly non-focal.  Psychiatric: Alert and oriented, normal insight.      Assessment and Plan:       58 Y M with a h/o HTN, HLD, PAF, and ANSHU presented with dyspnea, BLE edema, and RVR.      PAF with RVR    - cardiology stopped dilt gtt, increased his PO metoprolol.  Started and then stopped digoxin.    - ALICIA without thrombus on 3/7 but cardioversion only sustained sinus for 30 seconds.  EP started amiodarone, then he converted to sinus for a few days, only to revert back to Afib on 3/10.  Repeat cardioversion attempt 3/11.  Ablation had been planned for 3/15, but now being delayed because of his need for DAPT and AC.  - started apixaban    Acute systolic CHF.  TTE showed EF was down to 20-25% with RWMAs, DD, and RV failure.  EF had been 50-55% in 9/2023.  Furosemide IV, then changed to PO.  Started Entresto.  Metoprolol as above.  Empagliflozin, spironolactone.    CAD.  LHC 3/7 showed 70% D2, 80% LCx/OM2, and 80% prox RCA.  LHC repeated 3/8, required LOKESH to LCx and DESx2 to RCA.  Aspirin for 30 days, ticagrelor indefinitely, statin, 
New orders for Heparin Gtt noted, placed by Dr. Noe. LUPE Ward saw patient today. Nothing in note for gtt or procedure. Call to cardiology to confirm heparin orders. Received call from Eladia with Cardiology. Dr. Noe to come see patient, continue with orders placed.  
Patient admitted to room 202 from ED.  Patient oriented to room, call light, bed rails, phone, lights and bathroom.  Patient instructed about the schedule of the day including: vital sign frequency, lab draws, possible tests, frequency of MD and staff rounds, including RN/MD rounding together at bedside, daily weights, and I &O's.  Patient instructed about prescribed diet, how to use, and television.  NO bed alarm in place, patient fall risk score low.Telemetry box 97 in place, patient aware of placement and reason.  Bed locked, in lowest position, side rails up 2/4, call light within reach. Will continue to monitor.  
Patient brought home medications in. Perfect Serve sent to MD Samia to notify of the medications pt takes. See new orders.  
Patient wanting to ambulate room and unit. HR sustaining 140s-160s while ambulating. RN call to cardiology to see if they would like to add any other PO medications since dilt gtt was d/c'd this morning. Awaiting call back.  
Per Sylvia buckley/TAINA, give eliquis early.   
Placed call to cath lab to verify orders for heparin drip.   
Pt HR sustaining in the 140's. Cross cover notified and 250 mcg digoxin given at this time.  
Pt HR sustaining in the 170-180s overnight when pt would stand at bedside to use urinal. Assessed pt, vitals signs stable, pt asymptomatic. Plan of care ongoing.  
Pt d/c to home. AVS gone over and given to pt. All questions answered, pt verbalized understanding. PIV removed, pt tolerated well. Skin warm and dry, airway patent, a&ox4, pt assisted out in wheelchair to car.   
Pt is alert and oriented, vs stable, assessment updated and charted. Denies pain at this time.   Call light in reach.  Pt denies any other needs at this time.      
Pt to cath lab at this time. CMU aware.   Vitals:    03/07/24 1414   BP: 100/78   Pulse: 97   Resp: 18   Temp: 98.4 °F (36.9 °C)   SpO2: 98%       
RN received call from Ultrasound. They requested patient to be NPO for US to be completed this afternoon. Pt notified.  
Report given to patients nurse. Pt transferred to room. CMU called and monitor is on and verified.  Site looks unremarkable.  
TR band removed at this time.   Vitals:    03/08/24 1623   BP: 97/79   Pulse: 91   Resp: 18   Temp:    SpO2: 99%       
1.88 m (6' 2\")   Wt 104.2 kg (229 lb 11.2 oz)   SpO2 97%   BMI 29.49 kg/m²    Wt Readings from Last 3 Encounters:   03/08/24 104.2 kg (229 lb 11.2 oz)   02/15/24 115.5 kg (254 lb 9.6 oz)   02/15/24 115.2 kg (254 lb)       Intake/Output Summary (Last 24 hours) at 3/8/2024 0942  Last data filed at 3/8/2024 0841  Gross per 24 hour   Intake 897.72 ml   Output 3000 ml   Net -2102.28 ml       Respiratory:  Resp Assessment: Normal respiratory effort  Resp Auscultation: Clear to auscultation bilaterally   Cardiovascular:  Auscultation: regular rhythm and normal rate, normal S1S2, no murmur, rub or gallop  Palpation:  Nl PMI  JVP:  normal  Extremities: No Edema  Abdomen:  Soft, non-tender  Normal bowel sounds  Extremities:   No Cyanosis or Clubbing  Neurological/Psychiatric:  Oriented to time, place, and person  Non-anxious  Skin Warm and dry    Assessment:    Principal Problem:    Atrial fibrillation with RVR (HCC)    Dilated cardiomyopathy (HCC)    Elevated brain natriuretic peptide (BNP) level    CAD      Plan:  1. I had the opportunity to review the Weston Edmond clinical presentation and the available pertinent data. With the patient's clinical risk factors and symptoms, there is a high pretest likelihood for CAD. I have asked Weston Edmond to undergo coronary PCI for further treatement.    The procedure was explained in depth. All questions and alternate treatment strategies were discussed. The patient agrees to proceed. They understand all the risks associated with the procedure, including myocardial infarction, stroke, death, vascular complications, and the possible need for emergent surgery.  2. Serial troponin levels will be ordered and reviewed  3. The patient has been given instructions on addressing diet, regular exercise, weight control, smoking abstention, medication compliance, and stress minimization.  4. The patient should be treated with these therapies unless contraindicated:   ~asa 
Mia Alberto PA    Date of Admission: 3/3/2024  Current Hospital Day: 3    Chief Admission Complaint: Shortness of Breath (Worsening since this morning. States his ankles and abdomen have been more swollen. Denies any pain. Hx of a fib. Scheduled to have an ablation 3/15) and Dizziness       Atrial fibrillation with RVR (HCC)    Medications:  Personally reviewed in detail in conjunction w/ labs as documented for evidence of drug toxicity.     Infusion Medications   Scheduled Medications    metoprolol succinate  50 mg Oral Daily    furosemide  40 mg IntraVENous BID    potassium chloride  20 mEq Oral Daily    aspirin  81 mg Oral Daily    atorvastatin  20 mg Oral Daily    apixaban  5 mg Oral BID     PRN Meds:       Intake/Output Summary (Last 24 hours) at 3/5/2024 1522  Last data filed at 3/5/2024 1430  Gross per 24 hour   Intake 600 ml   Output 4025 ml   Net -3425 ml       Labs: Personally reviewed and interpreted for clinical significance.     Recent Labs     03/03/24 2040 03/04/24  0522   WBC 12.9* 9.8   HGB 15.3 14.9   HCT 47.1 45.4    198     Recent Labs     03/03/24 2040 03/04/24  0522 03/05/24  0525    139 140   K 4.1 4.1 3.9    104 103   CO2 20* 22 26   BUN 21* 21* 20   CREATININE 1.1 0.9 1.0   CALCIUM 8.6 8.7 8.7     Recent Labs     03/03/24 2040 03/05/24  0525   * 85*   * 202*   BILIDIR  --  0.4*   BILITOT 1.2* 1.2*   ALKPHOS 152* 122     No results for input(s): \"INR\" in the last 72 hours.  Recent Labs     03/03/24 2040   TROPHS 20       Urinalysis:      Lab Results   Component Value Date/Time    BLOODU Neg 04/09/2019 09:28 AM    SPECGRAV 1.020 04/09/2019 09:28 AM    GLUCOSEU Neg 04/09/2019 09:28 AM       Consults:     IP CONSULT TO HOSPITALIST  IP CONSULT TO CARDIOLOGY    I personally reviewed Lab Studies and Imaging and spoke with the  to plan details for eventual discharge.    PAZ HUNG MD        
137 138   K 4.1 4.2 4.2    103 104   CO2 26 22 22   BUN 18 18 19   CREATININE 1.0 0.8* 0.8*   CALCIUM 8.9 9.1 9.0     No results for input(s): \"AST\", \"ALT\", \"BILIDIR\", \"BILITOT\", \"ALKPHOS\" in the last 72 hours.    No results for input(s): \"INR\" in the last 72 hours.    Recent Labs     03/07/24  1842   TROPHS 15         Urinalysis:      Lab Results   Component Value Date/Time    BLOODU Neg 04/09/2019 09:28 AM    SPECGRAV 1.020 04/09/2019 09:28 AM    GLUCOSEU Neg 04/09/2019 09:28 AM       Consults:     IP CONSULT TO HOSPITALIST  IP CONSULT TO CARDIOLOGY  IP CONSULT TO CARDIAC REHAB  IP CONSULT TO CARDIAC REHAB    I personally reviewed Lab Studies and Imaging and spoke with the  to plan details for eventual discharge.    PAZ HUNG MD      
BLOODU Neg 04/09/2019 09:28 AM    SPECGRAV 1.020 04/09/2019 09:28 AM    GLUCOSEU Neg 04/09/2019 09:28 AM       Consults:     IP CONSULT TO HOSPITALIST  IP CONSULT TO CARDIOLOGY    I personally reviewed Lab Studies and Imaging and spoke with the  to plan details for eventual discharge.    PAZ HUNG MD      
Value Date/Time    BLOODU Neg 04/09/2019 09:28 AM    SPECGRAV 1.020 04/09/2019 09:28 AM    GLUCOSEU Neg 04/09/2019 09:28 AM       Consults:     IP CONSULT TO HOSPITALIST  IP CONSULT TO CARDIOLOGY    I personally reviewed Lab Studies and Imaging and spoke with the  to plan details for eventual discharge.    PAZ HUNG MD      
Ht 1.88 m (6' 2\")   Wt 104.2 kg (229 lb 11.2 oz)   SpO2 98%   BMI 29.49 kg/m²      The access site was controlled with manual pressure and/or appropriate closure device.     Moderate Conscious Sedation Details:  An independent trained observer pushed medications at my direction. We monitoring the patient's level of consciousness and vital signs/physiologic status throughout the procedure.  CPT codes 67113 and 55821        Start time: 1032  Stop time: 1135  ASA class: 3     Sedation totals:  Versad - 3mg  Fentanyl - 75mcg     EBL - minimal <5 cc blood loss     The patient was monitored continuously with the ECG, pulse oximetry, blood pressure, and direct observation.        CONCLUSIONS:     1.  Successful IVUS guided drug-eluting stent insertion to the right coronary artery and circumflex coronary artery.     ASSESSMENT/RECOMMENDATIONS:     1.  Recommend dual antiplatelet therapy with aspirin 81 mg and Brilinta 90 mg twice daily indefinitely.  2.  With the patient's underlying paroxysmal atrial fibrillation, he will require oral anticoagulation.  We can consider single antiplatelet drug therapy and oral anticoagulation after 30 days.    All labs and testing reviewed.  Lab Review     Renal Profile:   Lab Results   Component Value Date/Time    CREATININE 0.8 03/10/2024 04:51 AM    BUN 19 03/10/2024 04:51 AM     03/10/2024 04:51 AM    K 4.2 03/10/2024 04:51 AM     03/10/2024 04:51 AM    CO2 22 03/10/2024 04:51 AM     CBC:    Lab Results   Component Value Date/Time    WBC 8.9 03/09/2024 06:15 AM    RBC 5.09 03/09/2024 06:15 AM    HGB 15.5 03/09/2024 06:15 AM    HCT 46.7 03/09/2024 06:15 AM    MCV 91.8 03/09/2024 06:15 AM    RDW 14.6 03/09/2024 06:15 AM     03/09/2024 06:15 AM     BNP:  No results found for: \"BNP\"  Fasting Lipid Panel:    Lab Results   Component Value Date/Time    CHOL 94 03/07/2024 01:37 AM    HDL 36 03/07/2024 01:37 AM    TRIG 73 03/07/2024 01:37 AM     Cardiac 
stroke risk reduction  2.  Increase Toprol-XL 50 mg daily  3. Continue Lasix 40 mg IV BID  4.  Echocardiogram today  5.  Discussed atrial fibrillation, atrial fibrillation ablation and heart failure at length with patient      Sylvia GARRETT-CNP  [facility]  [phone]        
Enzymes:  CK/MbTroponinNo results found for: \"CKTOTAL\", \"CKMB\", \"CKMBINDEX\", \"TROPONINI\"  PT/ INR   Lab Results   Component Value Date/Time    INR 1.49 03/06/2024 03:53 PM    PROTIME 18.0 03/06/2024 03:53 PM     PTT No results found for: \"PTT\"   Lab Results   Component Value Date/Time    MG 2.50 03/08/2024 02:56 AM      Lab Results   Component Value Date/Time    TSH 1.45 08/23/2023 08:30 AM       Assessment:  Paroxysmal atrial fibrillation   -[id number] score 3- HTN, low LVEF, vascular disease  Ischemic cardiomyopathy   -LVEF 20-25%--new this admission  CAD   -LOKESH left circumflex and RCA--this admission  Hypertension  Hyperlipidemia  Sleep apnea  Elevated liver enzymes--likely due to congestion due to heart failure  Charcot Liliana tooth disease    Plan:   1.  Continue Eliquis 5 mg twice daily for stroke risk reduction   -May need to switch to warfarin after 30 days due to cost related issues-discussed with patient  2.  Increase amiodarone to 400 mg twice daily--will reduce dose at discharge  3.  Reduce Toprol-XL 25 mg twice daily  4. Continue Lasix 20 mg oral  5.  Continue Aldactone 25 mg daily  6.  Continue Jardiance 10 mg daily   7.  Echocardiogram with depressed LVEF 20-25%  8.  Will need GDMT for heart failure-possible cost issues due to no medication insurance. Will use 30 day free card for Entresto   9.  Daily weights, strict I's and O's and low-sodium diet--discussed at length with patient and wife  10. Atrial fibrillation ablation 3/15/2024 with Dr Barakat--postponed to at least 30 days from PCI/LOKESH due to triple therapy  11. Will follow up with Lorna Hoover CNP in next 2 weeks  12. Will arrange follow up with Dr Barakat   13.  Discussed plan at length with patient, wife, and nursing. Dr Gracia updated.    N.p.o. after midnight for DCCV with Dr. Barakat--if patient does not convert back to sinus rhythm      Sylvia Philip APRN-CNP  [facility]  [phone]

## 2024-03-11 NOTE — TELEPHONE ENCOUNTER
Patient still admitted. Possible CV today with CMV pending     Per [facility] progress note 3/10/2024:  Plan:  10. Atrial fibrillation ablation 3/15/2024 with Dr Barakat--postponed to at least 30 days from PCI/LOKESH due to triple therapy     CMV-please advise when (date finley) you would wish to proceed with AF RFCA so Paulina can get procedure rescheduled.

## 2024-03-11 NOTE — H&P
[facility]          Electrophysiology H&P Note       Date of Procedure: 3/11/2024  Patient's Name: Weston Edmond  YOB: 1965   Medical Record Number: [id number]    Indication:  Atrial fibrillation  Cardioversion     Consent:   I have discussed with the patient and/or the patient representative the indication, alternatives, and the possible risks and/or complications of the planned procedure and the anesthesia methods. The patient and/or patient representative appear to understand and agree to proceed.    Past Medical History:   Diagnosis Date    Arthritis     Charcot-Liliana-Tooth disease     CHF (congestive heart failure) ([facility])     Class 1 obesity due to excess calories with body mass index (BMI) of 33.0 to 33.9 in adult 01/17/2024    Coronary artery disease involving native coronary artery of native heart with angina pectoris ([facility]) 3/8/2024    Hyperlipemia     Hypertension     Mixed hyperlipidemia 01/15/2020    Observed sleep apnea 01/17/2024    PAF (paroxysmal atrial fibrillation) ([facility]) 09/14/2023    Sleep apnea        Past Surgical History:   Procedure Laterality Date    BREAST BIOPSY      CARDIOVERSION  3/7/2024    COLONOSCOPY      repeat in 2022    COLONOSCOPY N/A 8/22/2023    COLONOSCOPY performed by Krystle Perez MD at [facility] ENDOSCOPY    US THYROID BIOPSY  8/16/2021    US THYROID BIOPSY 8/16/2021 [facility] ULTRASOUND    VASECTOMY         Prior to Admission medications    Medication Sig Start Date End Date Taking? Authorizing Provider   amiodarone (CORDARONE) 200 MG tablet Take 1 tablet by mouth daily 3/10/24  Yes Sylvia Philip APRN - CNP   metoprolol succinate (TOPROL XL) 25 MG extended release tablet Take 1 tablet by mouth in the morning and at bedtime 3/10/24  Yes Sylvia Philip APRN - CNP   apixaban (ELIQUIS) 5 MG TABS tablet Take 1 tablet by mouth 2 times daily 3/9/24  Yes John Gracia MD   empagliflozin (JARDIANCE) 10 MG tablet Take 1 tablet by mouth daily 3/10/24  Yes 
\"LABURIN\"  Blood Cultures: No results found for: \"BC\"  No results found for: \"BLOODCULT2\"  Organism: No results found for: \"ORG\"    Imaging/Diagnostics Last 24 Hours   XR CHEST PORTABLE    Result Date: 3/3/2024  EXAMINATION: ONE XRAY VIEW OF THE CHEST 3/3/2024 8:37 pm COMPARISON: 02/15/2024 HISTORY: ORDERING SYSTEM PROVIDED HISTORY: shortness of breath TECHNOLOGIST PROVIDED HISTORY: Reason for exam:->shortness of breath Reason for Exam: SOB, dizziness FINDINGS: The heart is mildly enlarged and unchanged.  The pulmonary vessels are more prominent centrally.  The lungs are hypoinflated with mild linear densities along the lung bases which is less prominent.  No effusion is seen.     Stable mild cardiomegaly with mild central pulmonary congestion or pulmonary artery hypertension which is more prominent. Hypoinflation with mild bibasilar discoid atelectasis or scarring which is less prominent.         Electronically signed by John Lisa MD on 3/4/2024 at 12:37 AM

## 2024-03-11 NOTE — PLAN OF CARE
Problem: Chronic Conditions and Co-morbidities  Goal: Patient's chronic conditions and co-morbidity symptoms are monitored and maintained or improved  3/10/2024 0926 by Loan Shrestha RN  Outcome: Progressing  Note:   CHF Care Plan      Patient's EF (Ejection Fraction) is less than 40%    Heart Failure Medications:  Diuretics:: Furosemide and Spironolactone    (One of the following REQUIRED for EF </= 40%/SYSTOLIC FAILURE but MAY be used in EF% >40%/DIASTOLIC FAILURE)        ACE:: None        ARB:: None         ARNI:: Sacubitril/Valsartan-Entresto    (Beta Blockers)  NON- Evidenced Based Beta Blocker (for EF% >40%/DIASTOLIC FAILURE): None    Evidenced Based Beta Blocker::(REQUIRED for EF% <40%/SYSTOLIC FAILURE) Metoprolol SUCCinate- Toprol XL  ...................................................................................................................................................    Failed to redirect to the Timeline version of the [employer] SmartLink.      Patient's weights and intake/output reviewed    Daily Weight log at bedside, patient/family participation in use of log: \"yes    Patient's current weight today shows a difference of 4 lbs less than last documented weight.      Intake/Output Summary (Last 24 hours) at 3/10/2024 0926  Last data filed at 3/10/2024 0920  Gross per 24 hour   Intake 960 ml   Output 3000 ml   Net -2040 ml       Education Booklet Provided: yes    Comorbidities Reviewed Yes    Patient has a past medical history of Arthritis, Charcot-Liliana-Tooth disease, CHF (congestive heart failure) (HCC), Class 1 obesity due to excess calories with body mass index (BMI) of 33.0 to 33.9 in adult, Coronary artery disease involving native coronary artery of native heart with angina pectoris (HCC), Hyperlipemia, Hypertension, Mixed hyperlipidemia, Observed sleep apnea, PAF (paroxysmal atrial fibrillation) (HCC), and Sleep apnea.     >>For CHF and Comorbidity documentation on Education Time and 
  Problem: Chronic Conditions and Co-morbidities  Goal: Patient's chronic conditions and co-morbidity symptoms are monitored and maintained or improved  3/11/2024 0929 by Loan Shrestha RN  Outcome: Progressing  Note:   CHF Care Plan      Patient's EF (Ejection Fraction) is less than 40%    Heart Failure Medications:  Diuretics:: Furosemide and Spironolactone    (One of the following REQUIRED for EF </= 40%/SYSTOLIC FAILURE but MAY be used in EF% >40%/DIASTOLIC FAILURE)        ACE:: None        ARB:: None         ARNI:: Sacubitril/Valsartan-Entresto    (Beta Blockers)  NON- Evidenced Based Beta Blocker (for EF% >40%/DIASTOLIC FAILURE): None    Evidenced Based Beta Blocker::(REQUIRED for EF% <40%/SYSTOLIC FAILURE) Metoprolol SUCCinate- Toprol XL  ...................................................................................................................................................    Failed to redirect to the Timeline version of the [employer] SmartLink.      Patient's weights and intake/output reviewed    Daily Weight log at bedside, patient/family participation in use of log: \"yes    Patient's current weight today shows a difference of 0 lbs than last documented weight.      Intake/Output Summary (Last 24 hours) at 3/11/2024 0929  Last data filed at 3/11/2024 0507  Gross per 24 hour   Intake 600 ml   Output 1500 ml   Net -900 ml       Education Booklet Provided: yes    Comorbidities Reviewed Yes    Patient has a past medical history of Arthritis, Charcot-Liliana-Tooth disease, CHF (congestive heart failure) ([facility]), Class 1 obesity due to excess calories with body mass index (BMI) of 33.0 to 33.9 in adult, Coronary artery disease involving native coronary artery of native heart with angina pectoris (HCC), Hyperlipemia, Hypertension, Mixed hyperlipidemia, Observed sleep apnea, PAF (paroxysmal atrial fibrillation) (HCC), and Sleep apnea.     >>For CHF and Comorbidity documentation on Education Time and Topics, 
  Problem: Discharge Planning  Goal: Discharge to home or other facility with appropriate resources  3/4/2024 1433 by Helene Pulliam, RN  Outcome: Progressing     Problem: Safety - Adult  Goal: Free from fall injury  3/4/2024 1433 by Helene Pulliam, RN  Outcome: Progressing     
  Problem: Discharge Planning  Goal: Discharge to home or other facility with appropriate resources  3/7/2024 2118 by Marta Aguilar, RN  Outcome: Progressing  3/7/2024 1226 by Misa Sarah, RN  Outcome: Progressing  Note:   CHF Care Plan      Patient's EF (Ejection Fraction) is less than 40%    Heart Failure Medications:  Diuretics:: Furosemide and Spironolactone    (One of the following REQUIRED for EF </= 40%/SYSTOLIC FAILURE but MAY be used in EF% >40%/DIASTOLIC FAILURE)        ACE:: None        ARB:: None         ARNI:: None    (Beta Blockers)  NON- Evidenced Based Beta Blocker (for EF% >40%/DIASTOLIC FAILURE): None    Evidenced Based Beta Blocker::(REQUIRED for EF% <40%/SYSTOLIC FAILURE) Metoprolol SUCCinate- Toprol XL  ...................................................................................................................................................    Failed to redirect to the Timeline version of the [employer] SmartLink.      Patient's weights and intake/output reviewed    Daily Weight log at bedside, patient/family participation in use of log: \"yes    Patient's current weight today shows a difference of 8 lbs less than last documented weight.      Intake/Output Summary (Last 24 hours) at 3/7/2024 1226  Last data filed at 3/7/2024 1200  Gross per 24 hour   Intake 1160 ml   Output 4525 ml   Net -3365 ml       Education Booklet Provided: yes    Comorbidities Reviewed Yes    Patient has a past medical history of Arthritis, Charcot-Liliana-Tooth disease, CHF (congestive heart failure) (HCC), Class 1 obesity due to excess calories with body mass index (BMI) of 33.0 to 33.9 in adult, Hyperlipemia, Hypertension, Mixed hyperlipidemia, Observed sleep apnea, PAF (paroxysmal atrial fibrillation) (HCC), and Sleep apnea.     >>For CHF and Comorbidity documentation on Education Time and Topics, please see Education Tab      Pt resting in bed at this time on room air. Pt denies shortness of breath. Pt with 
  Problem: Discharge Planning  Goal: Discharge to home or other facility with appropriate resources  3/9/2024 2206 by Niharika David, RN  Outcome: Progressing  Flowsheets (Taken 3/9/2024 2206)  Discharge to home or other facility with appropriate resources:   Identify barriers to discharge with patient and caregiver   Identify discharge learning needs (meds, wound care, etc)   Refer to discharge planning if patient needs post-hospital services based on physician order or complex needs related to functional status, cognitive ability or social support system   Arrange for needed discharge resources and transportation as appropriate     Problem: Safety - Adult  Goal: Free from fall injury  3/9/2024 2206 by Niharika David, RN  Outcome: Progressing     Problem: Chronic Conditions and Co-morbidities  Goal: Patient's chronic conditions and co-morbidity symptoms are monitored and maintained or improved  3/9/2024 2206 by Niharika David, RN  Outcome: Progressing  Note:   CHF Care Plan      Patient's EF (Ejection Fraction) is less than 40%    Heart Failure Medications:  Diuretics:: Furosemide    (One of the following REQUIRED for EF </= 40%/SYSTOLIC FAILURE but MAY be used in EF% >40%/DIASTOLIC FAILURE)        ACE:: None        ARB:: None         ARNI:: Sacubitril/Valsartan-Entresto    (Beta Blockers)  NON- Evidenced Based Beta Blocker (for EF% >40%/DIASTOLIC FAILURE): None    Evidenced Based Beta Blocker::(REQUIRED for EF% <40%/SYSTOLIC FAILURE) Metoprolol SUCCinate- Toprol XL  ...................................................................................................................................................    Failed to redirect to the Timeline version of the [employer] SmartLink.      Patient's weights and intake/output reviewed    Daily Weight log at bedside, patient/family participation in use of log: \"yes    Patient's current weight today shows a difference of 1 lbs more than last documented 
  Problem: Discharge Planning  Goal: Discharge to home or other facility with appropriate resources  Outcome: Progressing     Problem: Safety - Adult  Goal: Free from fall injury  3/6/2024 0954 by Helene Pulliam RN  Outcome: Progressing     Problem: Chronic Conditions and Co-morbidities  Goal: Patient's chronic conditions and co-morbidity symptoms are monitored and maintained or improved  3/6/2024 0954 by Helene Pulliam RN  Outcome: Progressing     Problem: Pain  Goal: Verbalizes/displays adequate comfort level or baseline comfort level  3/6/2024 0954 by Helene Pulliam RN  Outcome: Progressing       CHF Care Plan      Patient's EF (Ejection Fraction) is greater than 40%    Heart Failure Medications:  Diuretics:: Furosemide    (One of the following REQUIRED for EF </= 40%/SYSTOLIC FAILURE but MAY be used in EF% >40%/DIASTOLIC FAILURE)        ACE:: None        ARB:: None         ARNI:: None    (Beta Blockers)  NON- Evidenced Based Beta Blocker (for EF% >40%/DIASTOLIC FAILURE): None    Evidenced Based Beta Blocker::(REQUIRED for EF% <40%/SYSTOLIC FAILURE) Metoprolol SUCCinate- Toprol XL  ...................................................................................................................................................    Failed to redirect to the Timeline version of the [employer] SmartLink.      Patient's weights and intake/output reviewed    Daily Weight log at bedside, patient/family participation in use of log: no    Patient's current weight today shows a difference of 5 lbs less than last documented weight.      Intake/Output Summary (Last 24 hours) at 3/6/2024 0956  Last data filed at 3/6/2024 0940  Gross per 24 hour   Intake 1080 ml   Output 5400 ml   Net -4320 ml       Education Booklet Provided: yes    Comorbidities Reviewed Yes    Patient has a past medical history of Arthritis, Charcot-Liliana-Tooth disease, CHF (congestive heart failure) (HCC), Class 1 obesity due to excess calories with 
  Problem: Discharge Planning  Goal: Discharge to home or other facility with appropriate resources  Outcome: Progressing     Problem: Safety - Adult  Goal: Free from fall injury  Outcome: Progressing     
  Problem: Discharge Planning  Goal: Discharge to home or other facility with appropriate resources  Outcome: Progressing  Note:   CHF Care Plan      Patient's EF (Ejection Fraction) is less than 40%    Heart Failure Medications:  Diuretics:: Furosemide and Spironolactone    (One of the following REQUIRED for EF </= 40%/SYSTOLIC FAILURE but MAY be used in EF% >40%/DIASTOLIC FAILURE)        ACE:: None        ARB:: None         ARNI:: None    (Beta Blockers)  NON- Evidenced Based Beta Blocker (for EF% >40%/DIASTOLIC FAILURE): None    Evidenced Based Beta Blocker::(REQUIRED for EF% <40%/SYSTOLIC FAILURE) Metoprolol SUCCinate- Toprol XL  ...................................................................................................................................................    Failed to redirect to the Timeline version of the [employer] SmartLink.      Patient's weights and intake/output reviewed    Daily Weight log at bedside, patient/family participation in use of log: \"yes    Patient's current weight today shows a difference of 8 lbs less than last documented weight.      Intake/Output Summary (Last 24 hours) at 3/7/2024 1226  Last data filed at 3/7/2024 1200  Gross per 24 hour   Intake 1160 ml   Output 4525 ml   Net -3365 ml       Education Booklet Provided: yes    Comorbidities Reviewed Yes    Patient has a past medical history of Arthritis, Charcot-Liliana-Tooth disease, CHF (congestive heart failure) (HCC), Class 1 obesity due to excess calories with body mass index (BMI) of 33.0 to 33.9 in adult, Hyperlipemia, Hypertension, Mixed hyperlipidemia, Observed sleep apnea, PAF (paroxysmal atrial fibrillation) (HCC), and Sleep apnea.     >>For CHF and Comorbidity documentation on Education Time and Topics, please see Education Tab      Pt resting in bed at this time on room air. Pt denies shortness of breath. Pt with pitting lower extremity edema.     Patient and/or Family's stated Goal of Care this Admission: 
  Problem: Discharge Planning  Goal: Discharge to home or other facility with appropriate resources  Outcome: Progressing  Note:   CHF Care Plan      Patient's EF (Ejection Fraction) is less than 40%    Heart Failure Medications:  Diuretics:: Furosemide and Spironolactone    (One of the following REQUIRED for EF </= 40%/SYSTOLIC FAILURE but MAY be used in EF% >40%/DIASTOLIC FAILURE)        ACE:: None        ARB:: None         ARNI:: None    (Beta Blockers)  NON- Evidenced Based Beta Blocker (for EF% >40%/DIASTOLIC FAILURE): None    Evidenced Based Beta Blocker::(REQUIRED for EF% <40%/SYSTOLIC FAILURE) Metoprolol SUCCinate- Toprol XL  ...................................................................................................................................................    Failed to redirect to the Timeline version of the [employer] SmartLink.      Patient's weights and intake/output reviewed    Daily Weight log at bedside, patient/family participation in use of log: \"yes    Patient's current weight today shows a difference of 5 lbs less than last documented weight.      Intake/Output Summary (Last 24 hours) at 3/8/2024 1234  Last data filed at 3/8/2024 1155  Gross per 24 hour   Intake 897.72 ml   Output 2450 ml   Net -1552.28 ml       Education Booklet Provided: yes    Comorbidities Reviewed Yes    Patient has a past medical history of Arthritis, Charcot-Liliana-Tooth disease, CHF (congestive heart failure) (HCC), Class 1 obesity due to excess calories with body mass index (BMI) of 33.0 to 33.9 in adult, Coronary artery disease involving native coronary artery of native heart with angina pectoris ([facility]), Hyperlipemia, Hypertension, Mixed hyperlipidemia, Observed sleep apnea, PAF (paroxysmal atrial fibrillation) (HCC), and Sleep apnea.     >>For CHF and Comorbidity documentation on Education Time and Topics, please see Education Tab      Pt resting in bed at this time on room air. Pt denies shortness of breath. Pt 
  Problem: Safety - Adult  Goal: Free from fall injury  Outcome: Progressing     Problem: Chronic Conditions and Co-morbidities  Goal: Patient's chronic conditions and co-morbidity symptoms are monitored and maintained or improved  Outcome: Progressing     Problem: Pain  Goal: Verbalizes/displays adequate comfort level or baseline comfort level  Outcome: Progressing   CHF Care Plan      Patient's EF (Ejection Fraction) is greater than 40%    Heart Failure Medications:  Diuretics:: Furosemide    (One of the following REQUIRED for EF </= 40%/SYSTOLIC FAILURE but MAY be used in EF% >40%/DIASTOLIC FAILURE)        ACE:: None        ARB:: None         ARNI:: None    (Beta Blockers)  NON- Evidenced Based Beta Blocker (for EF% >40%/DIASTOLIC FAILURE): None    Evidenced Based Beta Blocker::(REQUIRED for EF% <40%/SYSTOLIC FAILURE) Metoprolol SUCCinate- Toprol XL  ...................................................................................................................................................    Failed to redirect to the Timeline version of the [employer] SmartLink.      Patient's weights and intake/output reviewed    Daily Weight log at bedside, patient/family participation in use of log: \"yes    Patient's current weight today shows a difference of 9 lbs less than last documented weight.      Intake/Output Summary (Last 24 hours) at 3/6/2024 0533  Last data filed at 3/5/2024 2215  Gross per 24 hour   Intake 1080 ml   Output 5050 ml   Net -3970 ml       Education Booklet Provided: yes    Comorbidities Reviewed Yes    Patient has a past medical history of Arthritis, Charcot-Liliana-Tooth disease, CHF (congestive heart failure) (HCC), Class 1 obesity due to excess calories with body mass index (BMI) of 33.0 to 33.9 in adult, Hyperlipemia, Hypertension, Mixed hyperlipidemia, Observed sleep apnea, PAF (paroxysmal atrial fibrillation) (HCC), and Sleep apnea.     >>For CHF and Comorbidity documentation on Education Time 
  Problem: Safety - Adult  Goal: Free from fall injury  Outcome: Progressing     Problem: Chronic Conditions and Co-morbidities  Goal: Patient's chronic conditions and co-morbidity symptoms are monitored and maintained or improved  Outcome: Progressing     Problem: Pain  Goal: Verbalizes/displays adequate comfort level or baseline comfort level  Outcome: Progressing   CHF Care Plan      Patient's EF (Ejection Fraction) is less than 40%    Heart Failure Medications:  Diuretics:: Furosemide and Spironolactone    (One of the following REQUIRED for EF </= 40%/SYSTOLIC FAILURE but MAY be used in EF% >40%/DIASTOLIC FAILURE)        ACE:: None        ARB:: None         ARNI:: None    (Beta Blockers)  NON- Evidenced Based Beta Blocker (for EF% >40%/DIASTOLIC FAILURE): None    Evidenced Based Beta Blocker::(REQUIRED for EF% <40%/SYSTOLIC FAILURE) Metoprolol SUCCinate- Toprol XL  ...................................................................................................................................................    Failed to redirect to the Timeline version of the [employer] SmartLink.      Patient's weights and intake/output reviewed    Daily Weight log at bedside, patient/family participation in use of log: \"yes    Patient's current weight today shows a difference of 8 lbs less than last documented weight.      Intake/Output Summary (Last 24 hours) at 3/7/2024 0606  Last data filed at 3/7/2024 0202  Gross per 24 hour   Intake 1420 ml   Output 3925 ml   Net -2505 ml       Education Booklet Provided: yes    Comorbidities Reviewed Yes    Patient has a past medical history of Arthritis, Charcot-Liliana-Tooth disease, CHF (congestive heart failure) (HCC), Class 1 obesity due to excess calories with body mass index (BMI) of 33.0 to 33.9 in adult, Hyperlipemia, Hypertension, Mixed hyperlipidemia, Observed sleep apnea, PAF (paroxysmal atrial fibrillation) (HCC), and Sleep apnea.     >>For CHF and Comorbidity documentation on 
documented weight.      Intake/Output Summary (Last 24 hours) at 3/10/2024 2110  Last data filed at 3/10/2024 2028  Gross per 24 hour   Intake 960 ml   Output 1600 ml   Net -640 ml       Education Booklet Provided: yes    Comorbidities Reviewed Yes    Patient has a past medical history of Arthritis, Charcot-Liliana-Tooth disease, CHF (congestive heart failure) ([facility]), Class 1 obesity due to excess calories with body mass index (BMI) of 33.0 to 33.9 in adult, Coronary artery disease involving native coronary artery of native heart with angina pectoris ([facility]), Hyperlipemia, Hypertension, Mixed hyperlipidemia, Observed sleep apnea, PAF (paroxysmal atrial fibrillation) ([facility]), and Sleep apnea.     >>For CHF and Comorbidity documentation on Education Time and Topics, please see Education Tab      Pt resting in bed at this time on room air. Pt denies shortness of breath. Pt without lower extremity edema.     Patient and/or Family's stated Goal of Care this Admission: reduce shortness of breath, increase activity tolerance, better understand heart failure and disease management, be more comfortable, and reduce lower extremity edema prior to discharge        :       Problem: Pain  Goal: Verbalizes/displays adequate comfort level or baseline comfort level  3/10/2024 2110 by Niharika David, RN  Outcome: Progressing  Flowsheets (Taken 3/10/2024 2110)  Verbalizes/displays adequate comfort level or baseline comfort level:   Encourage patient to monitor pain and request assistance   Assess pain using appropriate pain scale   Administer analgesics based on type and severity of pain and evaluate response   Implement non-pharmacological measures as appropriate and evaluate response     Problem: ABCDS Injury Assessment  Goal: Absence of physical injury  3/10/2024 2110 by Niharika David, RN  Outcome: Progressing     
extremity edema prior to discharge        :   
tolerance, better understand heart failure and disease management, be more comfortable, and reduce lower extremity edema prior to discharge        :      Problem: Safety - Adult  Goal: Free from fall injury  3/5/2024 1443 by Misa Sarah RN  Outcome: Progressing  Note: Pt will remain free from falls throughout hospital stay. Fall precautions in place,  bed in lowest position with wheels locked and side rails 2/4 up. Room door open and hourly rounding completed. Will continue to monitor throughout shift.      Problem: Chronic Conditions and Co-morbidities  Goal: Patient's chronic conditions and co-morbidity symptoms are monitored and maintained or improved  3/5/2024 1443 by Misa Sarah RN  Outcome: Progressing  Note: Pt remains on telemetry for continuous monitoring of heart rate and rhythm. Cardiac medications administered as ordered.       Problem: Pain  Goal: Verbalizes/displays adequate comfort level or baseline comfort level  3/5/2024 1443 by Misa Sarah RN  Outcome: Progressing  Note: Pt will be satisfied with pain control. Pt uses numeric pain rating scale with reassessments after pain med administration. Will continue to monitor progression throughout shift.      
weight.      Intake/Output Summary (Last 24 hours) at 3/8/2024 2235  Last data filed at 3/8/2024 2016  Gross per 24 hour   Intake 907.72 ml   Output 700 ml   Net 207.72 ml       Education Booklet Provided: yes    Comorbidities Reviewed Yes    Patient has a past medical history of Arthritis, Charcot-Liliana-Tooth disease, CHF (congestive heart failure) ([facility]), Class 1 obesity due to excess calories with body mass index (BMI) of 33.0 to 33.9 in adult, Coronary artery disease involving native coronary artery of native heart with angina pectoris ([facility]), Hyperlipemia, Hypertension, Mixed hyperlipidemia, Observed sleep apnea, PAF (paroxysmal atrial fibrillation) ([facility]), and Sleep apnea.     >>For CHF and Comorbidity documentation on Education Time and Topics, please see Education Tab      Pt resting in bed at this time on room air. Pt denies shortness of breath. Pt with nonpitting lower extremity edema.     Patient and/or Family's stated Goal of Care this Admission: reduce shortness of breath, increase activity tolerance, better understand heart failure and disease management, be more comfortable, and reduce lower extremity edema prior to discharge        :       Problem: Pain  Goal: Verbalizes/displays adequate comfort level or baseline comfort level  3/8/2024 2234 by Niharika David, RN  Outcome: Progressing  Flowsheets (Taken 3/8/2024 2234)  Verbalizes/displays adequate comfort level or baseline comfort level:   Encourage patient to monitor pain and request assistance   Assess pain using appropriate pain scale   Administer analgesics based on type and severity of pain and evaluate response   Implement non-pharmacological measures as appropriate and evaluate response     Problem: ABCDS Injury Assessment  Goal: Absence of physical injury  Outcome: Progressing

## 2024-03-11 NOTE — PROCEDURES
[facility]        Cardiac Cardioversion Report    PATIENT: Weston Edmond  DATE: 3/7/2024  MRN: [id number]  CSN: [id number]  : 1965      Performing Physician: Javi Mixon MD, SOREN, [facility], [facility]  Primary Care Physician: Mia Alberto PA  Attending Provider: John Gracia MD    Procedures Performed: synchronized direct current cardioversion    Pre-procedure diagnosis: Atrial fibrillation    Post-procedure diagnosis: Atrial fibrillation    Details:   Weston Edmond was brought to the cardioversion area/lab in a fasting state after informed consent was obtained. If the patient was able to provide written consent, it was obtained.   The patient's vitals were monitored throughout the procedure. The patient was sedated using the appropriate levels of conscious sedation and ASA guidelines. This was performed under the direction of staff anesthesiology if requested.   The defibrillator pads were placed upon the patient in the appropriate locations for age and body condition.   Using synchronized direct current cardioversion, the patient was cardioverted from the underlying rhythm in attempts to restore sinus.  2 shocks were utilized.  The first was a 200 biphasic J synchronized shock which did place the patient in sinus rhythm.  Unfortunately after about 30 seconds he returned into atrial fibrillation.  A second shock was delivered 300 J biphasic synchronized energy.  This did not return the patient into sinus rhythm.    Complications: none    Conclusions: Unsuccessful attempt at direct-current cardioversion    The patient will remain in the lab until medically stable and cleared for discharge/transfer to appropriate medical unit.      Javi Mixon MD, SOREN, [facility], [facility]  Interventional Cardiology  [facility]  [phone] [facility] office  [phone] [facility]  [phone] [place] office  3/7/2024  3:26 PM           
          CARDIAC CATHETERIZATION REPORT     Procedure Date:  3/7/2024  Patient Name: Weston Edmond  MRN: [id number] : 1965      : Marvin Noe MD      PROCEDURES PERFORMED  Left heart cath via right radial approach  Coronary angiography  Left ventriculogram  Moderate sedation 15 min CPT 34151 (Midazolam: 1 mg, Fentanyl: 50 mcg)  Sedation start time: 1640  Sedation end time: 1701  US guidance for vascular access CPT 27068      INDICATION  Dilated cardiomyopathy    PROCEDURE DESCRIPTION  Risks/benefits/alternatives/outcomes were discussed with patient and/or family in detail and informed consent was obtained. Patient was prepped and draped in the usual sterile fashion. Versed and fentanyl were used for conscious sedation. Then local anaesthetic was applied over right radial puncture site. Using a modified Seldinger technique, the right radial artery was selectively cannulated and a 6Fr Terumo sheath was inserted into right radial artery.  Nitroglycerin was administered through the sheath.  Heparin was administered.  Diagnostic 6Fr JL3.5 and JR4 were used to engage left and right coronary arteries respectively and obtain angiogram. LVEDP and left ventriculogram were obtained by using the JR4 diagnostic catheter. At the conclusion of the procedure, a TR band was placed over the puncture site and hemostasis was obtained.  There were no immediate complications. I supervised the sedation with fentanyl and midazolam. An independent trained observer pushed meds at my direction.  We monitored the patient's level of consciousness and vital signs/physiologic status throughout the procedure duration. Patient tolerated the procedure well.      FINDINGS  Left main: Normal  LAD: 30% diffuse stenosis in the midsegment.  70% diffuse stenosis in the diagonal 2 branch.  Left circumflex: 80% stenosis in the large OM 2 branch  RCA: Dominant vessel, 80% stenosis in the proximal segment    LVEDP: 17  Left 
       Brief cardiology procedure report:   Full details of the procedure findings are available in CPACS.    Patient: Weston Edmond  Date: 3/7/2024  MRN: [id number]        Procedure performed: Transesophageal echocardiogram    Findings:  Reduced eft ventricular function with ejection fraction estimated at about 30%.    The cardiac valves were normal without evidence for infective endocarditis.    There is no evidence for an intracardiac shunt.     There is no evidence for cardiac source of embolus but there is significant smoke in the left atrium.  Left atrial enlargement    Indication:   Present on Admission:   Atrial fibrillation with RVR (HCC)   Leukocytosis   Transaminitis   Elevated brain natriuretic peptide (BNP) level      Sedation: The procedure was completed under the direction of anesthesia service.  The patient was given deep intravenous sedation.  The patient was monitored throughout the course of the procedure which included measurement of the patient's blood pressure, heart rate, SpO2, and clinical condition.      Procedural technique: Transesophageal probe was advanced through the oropharynx into the esophagus without difficulty. Multiple images were obtained including 2-dimensional and color Doppler.     Complications: There were no procedural complications.  Patient was transferred to the appropriate level of care post procedure.    The findings were discussed with available patient representatives/family.  All questions were answered.   
[facility]     Electrophysiology Procedure Note       Date of Procedure: 3/11/2024  Patient's Name: Weston Edmond  YOB: 1965   Medical Record Number: [id number]  Procedure Performed by: Armando Barakat MD    Procedures performed:  External Electrical cardioversion   IV sedation.   Brevital Sodium: 50 Mg     Indication of the procedure:   Cardioversion     Details of procedure:   The patient was brought to the cath lab area in a fasting and non-sedated state. The risks, benefits and alternatives of the procedure were discussed with the patient. The patient opted to proceed with the procedure. Written informed consent was signed and placed in the chart.  A timeout protocol was completed to identify the patient and the procedure being performed.     Sedation   Brevital was given by physician prior to cardioversion     ALICIA  No ALICIA was performed as patient has been in atrial fibrillation for less than 48 hours.     Cardioversion  80 mg Brevital was given by me as two separate doses, an electrical DC cardioversion was perfomred using 200J, synchronized shock. This did not result in conversion of atrial fibrillation initially. Repeat cardioversion using 300J resulted in successful conversion to sinus tachycardia.    Assessment:   Successful external DC cardioversion of atrial fibrillation  Recurrence of atrial fibrillation during the observation period in cath lab     Plan:  Continue anticoagulation, eliquis 5mg BID, uninterrupted for 30 days post cardioversion  Continue amiodarone, 400mg BID for one week followed by 200mg daily  Plan for repeat cardioversion in 1 week as outpatient  Continue metoprolol XL 25mg BID  Follow up in electrophysiolgy clinic with Dr Barakat in 1 month following repeat cardioversion    Armando Barakat MD  [facility]   Office: [phone]  Fax: [phone]   
has mild atherosclerotic disease.  It was moderate in size. It gave off septal perforators and a moderate sized diagonal branch. The LAD covered the entire apex of the left ventricle.     3. Left circumflex has severe 90 % atherosclerotic disease.  It was moderate in size. There was a moderate sized obtuse marginal branch.    4. Right coronary artery has severe 90% atherosclerotic disease. It was moderate in size and was the dominant artery.        /81   Pulse 87   Temp 98.1 °F (36.7 °C) (Oral)   Resp 18   Ht 1.88 m (6' 2\")   Wt 104.2 kg (229 lb 11.2 oz)   SpO2 98%   BMI 29.49 kg/m²     The access site was controlled with manual pressure and/or appropriate closure device.    Moderate Conscious Sedation Details:  An independent trained observer pushed medications at my direction. We monitoring the patient's level of consciousness and vital signs/physiologic status throughout the procedure.  CPT codes 60654 and 78107      Start time: 1032  Stop time: 1135  ASA class: 3    Sedation totals:  Versad - 3mg  Fentanyl - 75mcg    EBL - minimal <5 cc blood loss    The patient was monitored continuously with the ECG, pulse oximetry, blood pressure, and direct observation.      CONCLUSIONS:    1.  Successful IVUS guided drug-eluting stent insertion to the right coronary artery and circumflex coronary artery.    ASSESSMENT/RECOMMENDATIONS:    1.  Recommend dual antiplatelet therapy with aspirin 81 mg and Brilinta 90 mg twice daily indefinitely.  2.  With the patient's underlying paroxysmal atrial fibrillation, he will require oral anticoagulation.  We can consider single antiplatelet drug therapy and oral anticoagulation after 30 days.      Javi Mixon MD, SOREN, FACC, [facility]  Interventional Cardiology  [facility]  [phone] [facility] central office  [phone] [facility]  3/8/2024  11:55 AM

## 2024-03-11 NOTE — DISCHARGE SUMMARY
Discharge Summary    Name:  Weston Edmond /Age/Sex: 1965 (58 y.o. male)   Admit Date: 3/3/2024  Discharge Date: 3/11/24   MRN & CSN:  [id number] & [id number] Encounter Date and Time 3/11/24 1:37 PM EDT    Attending:  Paz Gracia MD Discharging Provider: PAZ GRACIA MD       Hospital Course:       58 Y M with a h/o HTN, HLD, PAF, and ANSHU presented with dyspnea, BLE edema, and RVR.        PAF with RVR    - cardiology stopped dilt gtt, increased his PO metoprolol.  Started and then stopped digoxin.    - ALICIA without thrombus on 3/7 but cardioversion only sustained sinus for 30 seconds.  EP started amiodarone, then he converted to sinus for a few days, only to revert back to Afib on 3/10, continued amiodarone.  Repeat cardioversion attempt planned within the next hour.  Ablation had been planned for 3/15, but now being delayed because of his need for DAPT and AC.  - started apixaban     Acute systolic CHF.  TTE showed EF was down to 20-25% with RWMAs, DD, and RV failure.  EF had been 50-55% in 2023.  Furosemide IV, then changed to PO.  Started Entresto.  Metoprolol as above.  Empagliflozin, spironolactone.     CAD.  LHC 3/7 showed 70% D2, 80% LCx/OM2, and 80% prox RCA.  LHC repeated 3/8, required LOKESH to LCx and DESx2 to RCA.  Aspirin for 30 days, ticagrelor indefinitely, statin, metoprolol.     LFTs elevated due to congestive hepatopathy.  Diuresis as above.      ANSHU.  He recently got a CPAP from Dr. Rossi but he doesn't tolerate it, didn't bring it here.  F/u as outpatient.         Discharge Diagnosis:   Atrial fibrillation with RVR (HCC)      Discharge Instructions:     Follow up with PCP within 1-2 weeks.  Follow up with cardiology per their instructions.     Diet: Diet NPO Exceptions are: Sips of Water with Meds  Activity: activity as tolerated  Discharged to:  home  Condition on discharge: Stable    Objective Findings at Discharge:   BP 91/72   Pulse 91   Temp 97.7 °F (36.5 °C) (Oral)

## 2024-03-12 ENCOUNTER — TELEPHONE (OUTPATIENT)
Dept: FAMILY MEDICINE CLINIC | Age: 59
End: 2024-03-12

## 2024-03-12 ENCOUNTER — PATIENT MESSAGE (OUTPATIENT)
Dept: CARDIOLOGY CLINIC | Age: 59
End: 2024-03-12

## 2024-03-12 LAB
POC ACT LR: >400 SEC

## 2024-03-12 NOTE — TELEPHONE ENCOUNTER
Care Transitions Initial Follow Up Call    Outreach made within 2 business days of discharge: Yes    Patient: Weston Edmond Patient : 1965   MRN: [id number]  Reason for Admission: There are no discharge diagnoses documented for the most recent discharge.  Discharge Date: 3/11/24       Spoke with: Weston, Patient returned call to office and aware of appointment.    Discharge department/facility: [facility] Interactive Patient Contact:  Was patient able to fill all prescriptions: Yes  Was patient instructed to bring all medications to the follow-up visit: Yes  Is patient taking all medications as directed in the discharge summary? Yes  Does patient understand their discharge instructions: Yes  Does patient have questions or concerns that need addressed prior to 7-14 day follow up office visit: no    Scheduled appointment with PCP within 7-14 days    Follow Up  Future Appointments   Date Time Provider Department Center   3/13/2024  9:30 AM Mia Alberto PA [facility]  [place] [facility]   3/22/2024  8:20 AM Gerardo Rossi MD [facility]   3/26/2024  8:15 AM Erlin Brennan MD [facility] JENAE Awad LPN

## 2024-03-12 NOTE — TELEPHONE ENCOUNTER
From: Weston Edmond  To: Dr. Armando Barakat  Sent: 3/12/2024 2:59 PM EDT  Subject: C-pap and A-fib    Hi Dr Barakat, my blood pressure has been pretty decent . Maybe average 115/80. My resting heart rate around 100, maybe a little less at times. My [employer] watch had me in A-fib yesterday and this morning. Early afternoon it has me in Sinus Arithme. Fingers crossed. Should I use my c-pap or hang loose? I see my family  tomorrow morning. Thanks for your help.  Respectfully Hardik.

## 2024-03-12 NOTE — TELEPHONE ENCOUNTER
Care Transitions Initial Follow Up Call    Outreach made within 2 business days of discharge: Yes    Patient: Weston Edmond Patient : 1965   MRN: [id number]  Reason for Admission: There are no discharge diagnoses documented for the most recent discharge.  Discharge Date: 3/11/24       Spoke with: Called patient no answer, left on the patient's machine to return the call to the office.    Discharge department/facility: [facility]      Scheduled appointment with PCP within 7-14 days    Follow Up  Future Appointments   Date Time Provider Department Center   3/13/2024  9:30 AM Mia Alberto PA [facility]  [place] - [facility]   3/22/2024  8:20 AM Gerardo Rossi MD [facility]   3/26/2024  8:15 AM Erlin Brennan MD [facility]       Amy Awad LPN

## 2024-03-13 ENCOUNTER — OFFICE VISIT (OUTPATIENT)
Dept: FAMILY MEDICINE CLINIC | Age: 59
End: 2024-03-13

## 2024-03-13 VITALS
OXYGEN SATURATION: 98 % | SYSTOLIC BLOOD PRESSURE: 90 MMHG | WEIGHT: 228 LBS | BODY MASS INDEX: 29.27 KG/M2 | DIASTOLIC BLOOD PRESSURE: 60 MMHG | HEART RATE: 81 BPM

## 2024-03-13 DIAGNOSIS — Z09 HOSPITAL DISCHARGE FOLLOW-UP: ICD-10-CM

## 2024-03-13 DIAGNOSIS — R74.01 TRANSAMINITIS: ICD-10-CM

## 2024-03-13 DIAGNOSIS — Z09 HOSPITAL DISCHARGE FOLLOW-UP: Primary | ICD-10-CM

## 2024-03-13 DIAGNOSIS — R05.3 CHRONIC COUGH: ICD-10-CM

## 2024-03-13 DIAGNOSIS — I10 ESSENTIAL HYPERTENSION: ICD-10-CM

## 2024-03-13 DIAGNOSIS — I48.91 ATRIAL FIBRILLATION WITH RVR (HCC): ICD-10-CM

## 2024-03-13 DIAGNOSIS — I42.0 DILATED CARDIOMYOPATHY (HCC): ICD-10-CM

## 2024-03-13 LAB
ALBUMIN SERPL-MCNC: 4.1 G/DL (ref 3.4–5)
ALP SERPL-CCNC: 87 U/L (ref 40–129)
ALT SERPL-CCNC: 116 U/L (ref 10–40)
AST SERPL-CCNC: 82 U/L (ref 15–37)
BILIRUB DIRECT SERPL-MCNC: 0.4 MG/DL (ref 0–0.3)
BILIRUB INDIRECT SERPL-MCNC: 0.6 MG/DL (ref 0–1)
BILIRUB SERPL-MCNC: 1 MG/DL (ref 0–1)
MAGNESIUM SERPL-MCNC: 2.4 MG/DL (ref 1.8–2.4)
PROT SERPL-MCNC: 6.5 G/DL (ref 6.4–8.2)

## 2024-03-13 NOTE — PROGRESS NOTES
20 MG delayed release capsule Take 1 capsule by mouth every morning (before breakfast) 90 capsule 1    fluticasone (FLONASE) 50 MCG/ACT nasal spray 1 spray by Each Nostril route daily 16 g 0    Multiple Vitamins-Minerals (THERAPEUTIC MULTIVITAMIN-MINERALS) tablet Take 1 tablet by mouth daily      vitamin D (CHOLECALCIFEROL) 1000 UNIT TABS tablet Take 1 tablet by mouth daily          Medications patient taking as of now reconciled against medications ordered at time of hospital discharge: Yes    Review of Systems   Constitutional:  Positive for fatigue.   Respiratory:  Positive for cough. Negative for chest tightness, shortness of breath and wheezing.    Cardiovascular:  Negative for chest pain, palpitations and leg swelling.   Neurological:  Negative for dizziness, light-headedness and headaches.       Objective:    BP 90/60   Pulse 81   Wt 103.4 kg (228 lb)   SpO2 98%   BMI 29.27 kg/m²   Physical Exam  Vitals reviewed.   Constitutional:       Appearance: Normal appearance. He is normal weight.   HENT:      Head: Normocephalic and atraumatic.   Eyes:      Extraocular Movements: Extraocular movements intact.      Conjunctiva/sclera: Conjunctivae normal.      Pupils: Pupils are equal, round, and reactive to light.   Neck:      Vascular: No carotid bruit.   Cardiovascular:      Rate and Rhythm: Normal rate. Rhythm irregular.      Heart sounds: Normal heart sounds.   Pulmonary:      Effort: Pulmonary effort is normal.      Breath sounds: Normal breath sounds.   Musculoskeletal:      Right lower leg: No edema.      Left lower leg: No edema.   Neurological:      Mental Status: He is alert.   Psychiatric:         Mood and Affect: Mood normal.         Behavior: Behavior normal.         Thought Content: Thought content normal.         Judgment: Judgment normal.         An electronic signature was used to authenticate this note.  --EAN Aparicio

## 2024-03-14 ENCOUNTER — TELEPHONE (OUTPATIENT)
Dept: CARDIOLOGY CLINIC | Age: 59
End: 2024-03-14

## 2024-03-14 DIAGNOSIS — R74.8 ABNORMAL LIVER ENZYMES: Primary | ICD-10-CM

## 2024-03-14 PROBLEM — I10 ELEVATED BLOOD PRESSURE READING WITH DIAGNOSIS OF HYPERTENSION: Status: RESOLVED | Noted: 2024-01-25 | Resolved: 2024-03-14

## 2024-03-14 ASSESSMENT — ENCOUNTER SYMPTOMS
CHEST TIGHTNESS: 0
SHORTNESS OF BREATH: 0
WHEEZING: 0
COUGH: 1

## 2024-03-14 NOTE — TELEPHONE ENCOUNTER
1st Attempt; No Answer- no ability to leave VM.  Will attempt again at a later time.       Georgia Quevedo RN

## 2024-03-14 NOTE — TELEPHONE ENCOUNTER
Armando Barakat MD [facility]; [facility] Cardio Rn1 minute ago (4:39 PM)       Yes please schedule for cardioversion now that he is on amiodarone. Plan was for one week after being on amiodarone. We pushed out ablation 1 month post PCI.    Thanks    Yuval

## 2024-03-14 NOTE — TELEPHONE ENCOUNTER
Care Transitions Initial Follow Up Call    Call within 2 business days of discharge: Yes     Patient: Weston Edmond Patient : 1965 MRN: [id number]    [unfilled]    RARS: Readmission Risk Score: 8.4       Spoke with: patient     Discharge department/facility: home    Non-face-to-face services provided:  Scheduled appointment with PCP-3/13/24    Spoke to patient for hospital follow up.  Pt states that he is doing well and denies any needs.       Follow Up  Future Appointments   Date Time Provider Department [place]   3/22/2024  8:20 AM Gerardo Rossi MD [facility]   3/26/2024  8:15 AM Erlin Brennan MD [facility]   2024  8:00 AM SCHEDULE, [facility] LAB ROOM 4 BRADY Quevedo RN

## 2024-03-14 NOTE — TELEPHONE ENCOUNTER
2nd Attempt; No Answer- Left HIPAA compliant voicemail with Non-Urgent Heart Failure Resource Line number for call back.     Georgia Quevedo RN

## 2024-03-14 NOTE — TELEPHONE ENCOUNTER
Mary Mustafa, APRN - CNP sent to Paulina Humphreys  Please reschedule this patient's ablation to sometime in the last half of April. Thanks.    Procedure:  Afib Ablation  Doctor:  Dr. Barakat  Date:  4/19/24  Time:  8am  Arrival:  6:30am  Reps:  Venessa  Anesthesia:  Yes      Spoke with patient. Please have patient arrive to the main entrance of [facility] ([address] [place], [place] [id number]) and check in with the registration desk.  They will be directed to the Cath Lab.  RN's - Please call patient regarding medication instructions. Remind patient to be NPO after midnight (8 hours prior). Do not apply lotions/creams on skin the day of procedure.      Patient mentioned a CV possibly next week. Please advise if I should schedule that as week.

## 2024-03-15 ENCOUNTER — HOSPITAL ENCOUNTER (OUTPATIENT)
Dept: CARDIAC CATH/INVASIVE PROCEDURES | Age: 59
Discharge: HOME | End: 2024-03-15
Attending: INTERNAL MEDICINE

## 2024-03-15 NOTE — TELEPHONE ENCOUNTER
Procedure:  CV  Doctor:  Dr. Barakat  Date:  3/20/24  Time:  2pm  Arrival:  12:30pm  Reps:  n/a  Anesthesia:  n/a      Spoke with patient. Please have patient arrive to the main entrance of [facility] ([address] [place] [id number]) and check in with the registration desk.  They will be directed to the Cath Lab.  Please call patient regarding medication instructions. Remind patient to be NPO after midnight (8 hours prior). Do not apply lotions/creams on skin the day of procedure.

## 2024-03-15 NOTE — TELEPHONE ENCOUNTER
Armando Barakat MD  [facility]; Paulina Humphreys4 days ago       Going home on amiodarone. Needs repeat cardioversion as outpatient in 1 week, ablation in 1 month from now.    Thanks    Yuval

## 2024-03-18 RX ORDER — SODIUM CHLORIDE 0.9 % (FLUSH) 0.9 %
5-40 SYRINGE (ML) INJECTION EVERY 12 HOURS SCHEDULED
OUTPATIENT
Start: 2024-03-18

## 2024-03-18 RX ORDER — SODIUM CHLORIDE 9 MG/ML
INJECTION, SOLUTION INTRAVENOUS PRN
OUTPATIENT
Start: 2024-03-18

## 2024-03-18 RX ORDER — SODIUM CHLORIDE 0.9 % (FLUSH) 0.9 %
5-40 SYRINGE (ML) INJECTION PRN
OUTPATIENT
Start: 2024-03-18

## 2024-03-20 ENCOUNTER — NURSE ONLY (OUTPATIENT)
Dept: CARDIOLOGY CLINIC | Age: 59
End: 2024-03-20
Payer: COMMERCIAL

## 2024-03-20 ENCOUNTER — HOSPITAL ENCOUNTER (OUTPATIENT)
Dept: CARDIAC CATH/INVASIVE PROCEDURES | Age: 59
Discharge: HOME | End: 2024-03-20
Attending: INTERNAL MEDICINE

## 2024-03-20 DIAGNOSIS — I48.0 PAF (PAROXYSMAL ATRIAL FIBRILLATION) (HCC): Primary | ICD-10-CM

## 2024-03-20 PROCEDURE — 93000 ELECTROCARDIOGRAM COMPLETE: CPT | Performed by: INTERNAL MEDICINE

## 2024-03-20 NOTE — PROGRESS NOTES
Patient presented for EKG today per request of CMV. EKG reviewed by CMV who confirmed patient was in sinus rhythm. CV for today, 3/20/2024 cancelled as patient is in sinus rhythm.     Patient had questions about being on triple therapy (Aspirin, Eliquis and Brilinta). Spoke with NPKK who advised patient needed to be on Aspirin for 30 days post LHC-so stop Aspirin on 4/8/2024 and continue Eliquis and Brilinta. Informed patient who gave V/U.     Patient had questions about returning to work and if he was allowed. Spoke with NPKK/CMV who advised patient could return to work however patient needed to be on light duty and monitor his symptoms, take breaks when needed, etc. Informed patient who gave V/U.     Patient is scheduled for ablation 4/19/2024.

## 2024-03-22 ENCOUNTER — OFFICE VISIT (OUTPATIENT)
Dept: PULMONOLOGY | Age: 59
End: 2024-03-22
Payer: COMMERCIAL

## 2024-03-22 VITALS
HEART RATE: 83 BPM | HEIGHT: 74 IN | TEMPERATURE: 97.6 F | RESPIRATION RATE: 16 BRPM | WEIGHT: 225 LBS | BODY MASS INDEX: 28.88 KG/M2 | OXYGEN SATURATION: 96 % | SYSTOLIC BLOOD PRESSURE: 91 MMHG | DIASTOLIC BLOOD PRESSURE: 67 MMHG

## 2024-03-22 DIAGNOSIS — I25.5 ISCHEMIC CARDIOMYOPATHY: ICD-10-CM

## 2024-03-22 DIAGNOSIS — R06.3 CHEYNE-STOKES RESPIRATION: ICD-10-CM

## 2024-03-22 DIAGNOSIS — G47.33 OSA (OBSTRUCTIVE SLEEP APNEA): ICD-10-CM

## 2024-03-22 DIAGNOSIS — E66.3 OVERWEIGHT (BMI 25.0-29.9): ICD-10-CM

## 2024-03-22 DIAGNOSIS — G47.34 NOCTURNAL HYPOXEMIA: ICD-10-CM

## 2024-03-22 DIAGNOSIS — I48.0 PAF (PAROXYSMAL ATRIAL FIBRILLATION) (HCC): Primary | ICD-10-CM

## 2024-03-22 PROCEDURE — 3074F SYST BP LT 130 MM HG: CPT | Performed by: INTERNAL MEDICINE

## 2024-03-22 PROCEDURE — 3078F DIAST BP <80 MM HG: CPT | Performed by: INTERNAL MEDICINE

## 2024-03-22 PROCEDURE — 99214 OFFICE O/P EST MOD 30 MIN: CPT | Performed by: INTERNAL MEDICINE

## 2024-03-22 NOTE — PATIENT INSTRUCTIONS
Remember to bring a list of pulmonary medications and any CPAP or BiPAP machines to your next appointment with the office.     Please keep all of your future appointments scheduled by [facility] [place] [facility] office. Out of respect for other patients and providers, you may be asked to reschedule your appointment if you arrive later than your scheduled appointment time. Appointments cancelled less than 24hrs in advance will be considered a no show. Patients with three missed appointments within 1 year or four missed appointments within 2 years can be dismissed from the practice.     Please be aware that our physicians are required to work in the Intensive Care Unit at [facility].  Your appointment may need to be rescheduled if they are designated to work during your appointment time.      You may receive a survey regarding the care you received during your visit.  Your input is valuable to us.  We encourage you to complete and return your survey.  We hope you will choose us in the future for your healthcare needs.     Pt instructed of all future appointment dates & times, including radiology, labs, procedures & referrals. If procedures were scheduled preparation instructions provided. Instructions on future appointments with [facility] were given.      In the next few weeks, you will be receiving a survey from [facility] regarding your visit today.  We would greatly appreciate it if you would take just a few minutes to fill that out.  It is very important to us that our patients receive top notch care and our surveys help keep us accountable. However, if your experience was not a good one, we want to hear about that as well. This is a key way we can keep track of problems and strive to correct any for future visits.    Again, we appreciate your time and thank you for choosing [facility]!    JENNIFER Neil

## 2024-03-22 NOTE — PROGRESS NOTES
Chief Complaint/Referring Provider: Patient was called back to the office for pulm evaluation to discuss the compliance data and options    Patient states that he was hospitalized for 8 days because of heart issues, patient states that he had increased fluid buildup in the body, patient also underwent various evaluations and underwent cardiac catheter with stent placement, patient also was found to be in A-fib with RVR and patient states that twice cardioversion was attempted without any success and then patient was put on amiodarone and finally last week patient had success and patient is in sinus rhythm, patient is supposed to have ablation for the A-fib on April 19, patient states that he did not use the CPAP when he is in the hospital, patient had requested the CPAP pressures to be decreased last time because he could not tolerate the higher pressures, patient states that he is able to tolerate that, patient states that he feels that he is better, patient does not have any increasing cough expectorant shortness of breath, patient does have rhinorrhea, patient has been using over-the-counter medication and patient does not know that it does not have any decongestants, patient's salt intake has gone down, patient is on Lasix at this time, patient does not have any confusion lethargy, no other pertinent review of system of concern    Previous HPIPatient has been called to the office for pulm evaluation to discuss the test results, patient has been having some increased phlegm with mucoid expectoration, patient has occasional rhinorrhea, patient does not have any epistaxis or hemoptysis, no sore throat or difficulty in swallowing, no coughing or choking when eating, patient has a gas based heating with a humidifier in place, patient has a dog as a pet, patient does have sleep fragmentation, patient does not have any abdominal symptoms of concern, no increasing leg edema, no other pertinent review of system of

## 2024-03-25 NOTE — PROGRESS NOTES
[facility]   Cardiac Consultation    Referring Provider:  Mia Alberto PA     Chief Complaint   Patient presents with    Follow-Up from Hospital    Atrial Fibrillation    Hypertension    Hyperlipidemia    Coronary Artery Disease    Cardiomyopathy      Weston Edmond   1965    History of Present Illness:    Weston Edmond is a 58 y.o. malew ho is here today for hospital follow up for CHF. He was initially seen as a new patient at the request of  Mia Alberto PA for reduced EF. He had a past medical history of hypertension, hyperlipidemia.   He was initially seen by PCP on 8/2023 at that time found to be in atrial fibrillation.  EKG with atrial fibrillation\" controlled rates, heart rate 106.  He was started on metoprolol 25 mg daily.  He also underwent an echo in September which demonstrated normal LVEF with some wall motion abnormalities, subsequent stress test (Jonatan protocol) with excellent METS, stopped for target heart rate, and 9 minutes, no evidence of ischemia but had mildly reduced LVEF of 40 to 45%, referred to general cardiology. He was seen by the EP team 10/19/2023 and was referred for a sleep study and had 2 week monitor placed.    Admitted on 3/3/2024 with CHF. BNP elevated at 6407. CXR showed pulmonary vascular congestion. EKG showed rapid AF, previously diagnosed with plans for ablation. Echo showed an EF of 20-25%. He had an LHC with PCI of the RCA. He had a ALICIA/CV but promptly returned to AF. He went back into SR after amiodarone was started.  he was scheduled for a repeat cardioversion 3/20/2024 but was founf to be in SR, cardioversion cancelled.    He is scheduled for a AF ablation on 4/19/2024. Today he sates he has been feeling well overall since he left the hospital. He states he has been out of AF for the last 14 days. He is tolerating his medications, he is not sure how he is taking amiodarone. Patient currently denies any weight gain, edema, palpitations, chest

## 2024-03-26 ENCOUNTER — OFFICE VISIT (OUTPATIENT)
Dept: CARDIOLOGY CLINIC | Age: 59
End: 2024-03-26
Payer: COMMERCIAL

## 2024-03-26 VITALS
DIASTOLIC BLOOD PRESSURE: 62 MMHG | OXYGEN SATURATION: 96 % | HEART RATE: 96 BPM | SYSTOLIC BLOOD PRESSURE: 94 MMHG | HEIGHT: 74 IN | BODY MASS INDEX: 29 KG/M2 | WEIGHT: 226 LBS

## 2024-03-26 DIAGNOSIS — I25.119 CORONARY ARTERY DISEASE INVOLVING NATIVE CORONARY ARTERY OF NATIVE HEART WITH ANGINA PECTORIS (HCC): Primary | ICD-10-CM

## 2024-03-26 DIAGNOSIS — I10 ESSENTIAL HYPERTENSION: ICD-10-CM

## 2024-03-26 DIAGNOSIS — E78.2 MIXED HYPERLIPIDEMIA: ICD-10-CM

## 2024-03-26 DIAGNOSIS — I48.0 PAF (PAROXYSMAL ATRIAL FIBRILLATION) (HCC): ICD-10-CM

## 2024-03-26 PROCEDURE — 3078F DIAST BP <80 MM HG: CPT | Performed by: INTERNAL MEDICINE

## 2024-03-26 PROCEDURE — 99214 OFFICE O/P EST MOD 30 MIN: CPT | Performed by: INTERNAL MEDICINE

## 2024-03-26 PROCEDURE — 3074F SYST BP LT 130 MM HG: CPT | Performed by: INTERNAL MEDICINE

## 2024-03-26 RX ORDER — AMIODARONE HYDROCHLORIDE 200 MG/1
200 TABLET ORAL DAILY
Qty: 30 TABLET | Refills: 5 | Status: CANCELLED | OUTPATIENT
Start: 2024-03-26 | End: 2024-09-22

## 2024-03-26 RX ORDER — AMIODARONE HYDROCHLORIDE 200 MG/1
200 TABLET ORAL DAILY
Qty: 90 TABLET | Refills: 3 | Status: SHIPPED | OUTPATIENT
Start: 2024-03-26

## 2024-03-26 NOTE — PATIENT INSTRUCTIONS
Plan:  ~Stop Aspirin as of 4/11/2024, continue eliquis and Brilinta   ~Ablation schedule for 4/19/2024  ~You should be taking amiodarone 200 mg daily   ~Script for 200 mg tablet sent to the pharmacy. You can cut in half what you have at home   ~We will hold off on increasing medications due to lower blood pressures   ~Will repeat echocardiogram to evaluate heart function after ablation- 2-3 months   Cardiac medications reviewed including indications and pertinent side effects. Medication list updated at this visit.   Patient verbalizes understanding of the need for treatment and education has been provided at today's visit. Additional education material will be provided in after visit summary.    Check blood pressure and heart rate at home a few times per week- keep a log with dates and times and bring to office visit   Regular exercise and following a healthy diet encouraged   Follow up with KRISTA Rothman after ablation, 5 weeks   Follow up with me in 3 months   ~Decrease activity if you have chest pain or shortness of breath   ~Ok to use hot tub, limit use since this can drop your blood pressure    ~Make sure you are well hydrated and do not stay in too long

## 2024-04-02 ENCOUNTER — TELEPHONE (OUTPATIENT)
Dept: CARDIOLOGY CLINIC | Age: 59
End: 2024-04-02

## 2024-04-02 NOTE — TELEPHONE ENCOUNTER
Submitted PA for ENTRESTO  Via [facility]  Key: [id number] STATUS: NOT SENT    While doing the PAf for this medication I could not find a correct dx code for this.      Please send back with a dx code and then we can finish the PA.    If this requires a response please respond to the pool ( P MHCX PSC MEDICATION PRE-AUTH).      Thank you please advise patient.      Follow up done daily; if no decision with in three days we will refax.  If another three days goes by with no decision will call the insurance for status.

## 2024-04-02 NOTE — TELEPHONE ENCOUNTER
[facility] outpatient pharmacy stated that they are faxing over the PA for Entresto. They provided the key of: [id number]

## 2024-04-02 NOTE — TELEPHONE ENCOUNTER
Indication is ischemic cardiomyopathy and systolic CHF  Don't know what the codes are for these diagnosis

## 2024-04-03 ENCOUNTER — PATIENT MESSAGE (OUTPATIENT)
Dept: CARDIOLOGY CLINIC | Age: 59
End: 2024-04-03

## 2024-04-03 NOTE — TELEPHONE ENCOUNTER
From: Weston Edmond  To: Dr. Armando Barakat  Sent: 4/3/2024 10:20 AM EDT  Subject: flying    Hi Dr Dooley, I don't have to go back to work until after the ablation, Is it ok to fly to [place]?  Thanks, Hardik

## 2024-04-04 NOTE — TELEPHONE ENCOUNTER
Armando Barakat MD  [facility] Ep11 hours ago (7:06 PM)       There is no contraindication to flying.    Yuval

## 2024-04-05 DIAGNOSIS — R74.8 ABNORMAL LIVER ENZYMES: ICD-10-CM

## 2024-04-06 LAB
ALBUMIN SERPL-MCNC: 4.5 G/DL (ref 3.4–5)
ALP SERPL-CCNC: 76 U/L (ref 40–129)
ALT SERPL-CCNC: 56 U/L (ref 10–40)
AST SERPL-CCNC: 35 U/L (ref 15–37)
BILIRUB DIRECT SERPL-MCNC: <0.2 MG/DL (ref 0–0.3)
BILIRUB INDIRECT SERPL-MCNC: ABNORMAL MG/DL (ref 0–1)
BILIRUB SERPL-MCNC: 0.6 MG/DL (ref 0–1)
PROT SERPL-MCNC: 6.9 G/DL (ref 6.4–8.2)

## 2024-04-19 ENCOUNTER — ANESTHESIA EVENT (OUTPATIENT)
Dept: CARDIAC CATH/INVASIVE PROCEDURES | Age: 59
End: 2024-04-19
Payer: COMMERCIAL

## 2024-04-19 ENCOUNTER — HOSPITAL ENCOUNTER (OUTPATIENT)
Dept: CARDIAC CATH/INVASIVE PROCEDURES | Age: 59
Discharge: HOME | End: 2024-04-19
Attending: INTERNAL MEDICINE | Admitting: INTERNAL MEDICINE
Payer: COMMERCIAL

## 2024-04-19 ENCOUNTER — ANESTHESIA (OUTPATIENT)
Dept: CARDIAC CATH/INVASIVE PROCEDURES | Age: 59
End: 2024-04-19
Payer: COMMERCIAL

## 2024-04-19 VITALS
HEIGHT: 73 IN | RESPIRATION RATE: 17 BRPM | HEART RATE: 76 BPM | DIASTOLIC BLOOD PRESSURE: 54 MMHG | SYSTOLIC BLOOD PRESSURE: 101 MMHG | BODY MASS INDEX: 29.69 KG/M2 | OXYGEN SATURATION: 97 % | TEMPERATURE: 97.8 F | WEIGHT: 224 LBS

## 2024-04-19 LAB
ABO + RH BLD: NORMAL
ANION GAP SERPL CALCULATED.3IONS-SCNC: 13 MMOL/L (ref 3–16)
BLD GP AB SCN SERPL QL: NORMAL
BUN SERPL-MCNC: 19 MG/DL (ref 7–20)
CALCIUM SERPL-MCNC: 9.4 MG/DL (ref 8.3–10.6)
CHLORIDE SERPL-SCNC: 103 MMOL/L (ref 99–110)
CO2 SERPL-SCNC: 22 MMOL/L (ref 21–32)
CREAT SERPL-MCNC: 0.9 MG/DL (ref 0.9–1.3)
DEPRECATED RDW RBC AUTO: 15 % (ref 12.4–15.4)
EKG ATRIAL RATE: 79 BPM
EKG DIAGNOSIS: NORMAL
EKG P AXIS: 28 DEGREES
EKG P-R INTERVAL: 210 MS
EKG Q-T INTERVAL: 424 MS
EKG QRS DURATION: 106 MS
EKG QTC CALCULATION (BAZETT): 486 MS
EKG R AXIS: 13 DEGREES
EKG T AXIS: 56 DEGREES
EKG VENTRICULAR RATE: 79 BPM
GFR SERPLBLD CREATININE-BSD FMLA CKD-EPI: >90 ML/MIN/{1.73_M2}
GLUCOSE SERPL-MCNC: 104 MG/DL (ref 70–99)
HCT VFR BLD AUTO: 46.8 % (ref 40.5–52.5)
HGB BLD-MCNC: 15.7 G/DL (ref 13.5–17.5)
MCH RBC QN AUTO: 29.5 PG (ref 26–34)
MCHC RBC AUTO-ENTMCNC: 33.5 G/DL (ref 31–36)
MCV RBC AUTO: 88 FL (ref 80–100)
PLATELET # BLD AUTO: 198 K/UL (ref 135–450)
PMV BLD AUTO: 8.2 FL (ref 5–10.5)
POTASSIUM SERPL-SCNC: 4.3 MMOL/L (ref 3.5–5.1)
RBC # BLD AUTO: 5.32 M/UL (ref 4.2–5.9)
SODIUM SERPL-SCNC: 138 MMOL/L (ref 136–145)
WBC # BLD AUTO: 7.2 K/UL (ref 4–11)

## 2024-04-19 PROCEDURE — C1766 INTRO/SHEATH,STRBLE,NON-PEEL: HCPCS

## 2024-04-19 PROCEDURE — 6360000002 HC RX W HCPCS

## 2024-04-19 PROCEDURE — 86900 BLOOD TYPING SEROLOGIC ABO: CPT

## 2024-04-19 PROCEDURE — 85027 COMPLETE CBC AUTOMATED: CPT

## 2024-04-19 PROCEDURE — 2580000003 HC RX 258: Performed by: INTERNAL MEDICINE

## 2024-04-19 PROCEDURE — 6370000000 HC RX 637 (ALT 250 FOR IP)

## 2024-04-19 PROCEDURE — 93010 ELECTROCARDIOGRAM REPORT: CPT | Performed by: INTERNAL MEDICINE

## 2024-04-19 PROCEDURE — 93656 COMPRE EP EVAL ABLTJ ATR FIB: CPT | Performed by: INTERNAL MEDICINE

## 2024-04-19 PROCEDURE — C1759 CATH, INTRA ECHOCARDIOGRAPHY: HCPCS

## 2024-04-19 PROCEDURE — C1894 INTRO/SHEATH, NON-LASER: HCPCS

## 2024-04-19 PROCEDURE — 2500000003 HC RX 250 WO HCPCS

## 2024-04-19 PROCEDURE — 2709999900 HC NON-CHARGEABLE SUPPLY

## 2024-04-19 PROCEDURE — 93005 ELECTROCARDIOGRAM TRACING: CPT | Performed by: INTERNAL MEDICINE

## 2024-04-19 PROCEDURE — 7100000001 HC PACU RECOVERY - ADDTL 15 MIN: Performed by: ANESTHESIOLOGY

## 2024-04-19 PROCEDURE — 3700000000 HC ANESTHESIA ATTENDED CARE: Performed by: ANESTHESIOLOGY

## 2024-04-19 PROCEDURE — 2500000003 HC RX 250 WO HCPCS: Performed by: NURSE ANESTHETIST, CERTIFIED REGISTERED

## 2024-04-19 PROCEDURE — 86850 RBC ANTIBODY SCREEN: CPT

## 2024-04-19 PROCEDURE — 93656 COMPRE EP EVAL ABLTJ ATR FIB: CPT

## 2024-04-19 PROCEDURE — 80048 BASIC METABOLIC PNL TOTAL CA: CPT

## 2024-04-19 PROCEDURE — 2720000010 HC SURG SUPPLY STERILE

## 2024-04-19 PROCEDURE — C1732 CATH, EP, DIAG/ABL, 3D/VECT: HCPCS

## 2024-04-19 PROCEDURE — 3700000001 HC ADD 15 MINUTES (ANESTHESIA): Performed by: ANESTHESIOLOGY

## 2024-04-19 PROCEDURE — 93622 COMP EP EVAL L VENTR PAC&REC: CPT

## 2024-04-19 PROCEDURE — 93623 PRGRMD STIMJ&PACG IV RX NFS: CPT | Performed by: INTERNAL MEDICINE

## 2024-04-19 PROCEDURE — C1760 CLOSURE DEV, VASC: HCPCS

## 2024-04-19 PROCEDURE — 85347 COAGULATION TIME ACTIVATED: CPT

## 2024-04-19 PROCEDURE — 6360000002 HC RX W HCPCS: Performed by: NURSE ANESTHETIST, CERTIFIED REGISTERED

## 2024-04-19 PROCEDURE — 86901 BLOOD TYPING SEROLOGIC RH(D): CPT

## 2024-04-19 PROCEDURE — 7100000000 HC PACU RECOVERY - FIRST 15 MIN: Performed by: ANESTHESIOLOGY

## 2024-04-19 RX ORDER — PROPOFOL 10 MG/ML
INJECTION, EMULSION INTRAVENOUS PRN
Status: DISCONTINUED | OUTPATIENT
Start: 2024-04-19 | End: 2024-04-19 | Stop reason: SDUPTHER

## 2024-04-19 RX ORDER — LORAZEPAM 0.5 MG/1
0.5 TABLET ORAL
Status: DISCONTINUED | OUTPATIENT
Start: 2024-04-19 | End: 2024-04-19 | Stop reason: HOSPADM

## 2024-04-19 RX ORDER — SODIUM CHLORIDE 0.9 % (FLUSH) 0.9 %
5-40 SYRINGE (ML) INJECTION EVERY 12 HOURS SCHEDULED
Status: DISCONTINUED | OUTPATIENT
Start: 2024-04-19 | End: 2024-04-19 | Stop reason: HOSPADM

## 2024-04-19 RX ORDER — SODIUM CHLORIDE 0.9 % (FLUSH) 0.9 %
5-40 SYRINGE (ML) INJECTION PRN
Status: DISCONTINUED | OUTPATIENT
Start: 2024-04-19 | End: 2024-04-19 | Stop reason: HOSPADM

## 2024-04-19 RX ORDER — MIDAZOLAM HYDROCHLORIDE 1 MG/ML
INJECTION INTRAMUSCULAR; INTRAVENOUS PRN
Status: DISCONTINUED | OUTPATIENT
Start: 2024-04-19 | End: 2024-04-19 | Stop reason: SDUPTHER

## 2024-04-19 RX ORDER — OMEPRAZOLE 20 MG/1
40 CAPSULE, DELAYED RELEASE ORAL
Qty: 90 CAPSULE | Refills: 1 | Status: SHIPPED | OUTPATIENT
Start: 2024-04-19

## 2024-04-19 RX ORDER — DEXAMETHASONE SODIUM PHOSPHATE 4 MG/ML
INJECTION, SOLUTION INTRA-ARTICULAR; INTRALESIONAL; INTRAMUSCULAR; INTRAVENOUS; SOFT TISSUE PRN
Status: DISCONTINUED | OUTPATIENT
Start: 2024-04-19 | End: 2024-04-19 | Stop reason: SDUPTHER

## 2024-04-19 RX ORDER — HEPARIN SODIUM 1000 [USP'U]/ML
INJECTION, SOLUTION INTRAVENOUS; SUBCUTANEOUS
Status: COMPLETED | OUTPATIENT
Start: 2024-04-19 | End: 2024-04-19

## 2024-04-19 RX ORDER — ONDANSETRON 2 MG/ML
INJECTION INTRAMUSCULAR; INTRAVENOUS PRN
Status: DISCONTINUED | OUTPATIENT
Start: 2024-04-19 | End: 2024-04-19 | Stop reason: SDUPTHER

## 2024-04-19 RX ORDER — PANTOPRAZOLE SODIUM 40 MG/1
40 TABLET, DELAYED RELEASE ORAL
Status: DISCONTINUED | OUTPATIENT
Start: 2024-04-19 | End: 2024-04-19 | Stop reason: HOSPADM

## 2024-04-19 RX ORDER — ROCURONIUM BROMIDE 10 MG/ML
INJECTION, SOLUTION INTRAVENOUS PRN
Status: DISCONTINUED | OUTPATIENT
Start: 2024-04-19 | End: 2024-04-19 | Stop reason: SDUPTHER

## 2024-04-19 RX ORDER — LIDOCAINE HYDROCHLORIDE 20 MG/ML
INJECTION, SOLUTION INFILTRATION; PERINEURAL PRN
Status: DISCONTINUED | OUTPATIENT
Start: 2024-04-19 | End: 2024-04-19 | Stop reason: SDUPTHER

## 2024-04-19 RX ORDER — SODIUM CHLORIDE 9 MG/ML
INJECTION, SOLUTION INTRAVENOUS PRN
Status: DISCONTINUED | OUTPATIENT
Start: 2024-04-19 | End: 2024-04-19 | Stop reason: HOSPADM

## 2024-04-19 RX ORDER — ONDANSETRON 2 MG/ML
4 INJECTION INTRAMUSCULAR; INTRAVENOUS EVERY 6 HOURS PRN
Status: DISCONTINUED | OUTPATIENT
Start: 2024-04-19 | End: 2024-04-19 | Stop reason: HOSPADM

## 2024-04-19 RX ORDER — ACETAMINOPHEN 325 MG/1
650 TABLET ORAL EVERY 4 HOURS PRN
Status: DISCONTINUED | OUTPATIENT
Start: 2024-04-19 | End: 2024-04-19 | Stop reason: HOSPADM

## 2024-04-19 RX ORDER — PHENYLEPHRINE HCL IN 0.9% NACL 1 MG/10 ML
SYRINGE (ML) INTRAVENOUS PRN
Status: DISCONTINUED | OUTPATIENT
Start: 2024-04-19 | End: 2024-04-19 | Stop reason: SDUPTHER

## 2024-04-19 RX ADMIN — ROCURONIUM BROMIDE 50 MG: 50 INJECTION, SOLUTION INTRAVENOUS at 08:24

## 2024-04-19 RX ADMIN — Medication 100 MCG: at 10:03

## 2024-04-19 RX ADMIN — HEPARIN SODIUM 3000 UNITS: 1000 INJECTION, SOLUTION INTRAVENOUS; SUBCUTANEOUS at 09:43

## 2024-04-19 RX ADMIN — HEPARIN SODIUM 4000 UNITS: 1000 INJECTION, SOLUTION INTRAVENOUS; SUBCUTANEOUS at 09:13

## 2024-04-19 RX ADMIN — LIDOCAINE HYDROCHLORIDE 50 MG: 20 INJECTION, SOLUTION INFILTRATION; PERINEURAL at 08:24

## 2024-04-19 RX ADMIN — DEXAMETHASONE SODIUM PHOSPHATE 4 MG: 4 INJECTION, SOLUTION INTRAMUSCULAR; INTRAVENOUS at 08:24

## 2024-04-19 RX ADMIN — SODIUM CHLORIDE: 9 INJECTION, SOLUTION INTRAVENOUS at 08:18

## 2024-04-19 RX ADMIN — Medication 200 MCG: at 09:43

## 2024-04-19 RX ADMIN — Medication 100 MCG: at 08:53

## 2024-04-19 RX ADMIN — ONDANSETRON 4 MG: 2 INJECTION INTRAMUSCULAR; INTRAVENOUS at 08:24

## 2024-04-19 RX ADMIN — SUGAMMADEX 100 MG: 100 INJECTION, SOLUTION INTRAVENOUS at 10:37

## 2024-04-19 RX ADMIN — Medication 100 MCG: at 09:40

## 2024-04-19 RX ADMIN — PANTOPRAZOLE SODIUM 40 MG: 40 TABLET, DELAYED RELEASE ORAL at 13:32

## 2024-04-19 RX ADMIN — SUGAMMADEX 100 MG: 100 INJECTION, SOLUTION INTRAVENOUS at 09:50

## 2024-04-19 RX ADMIN — HEPARIN SODIUM 7000 UNITS: 1000 INJECTION, SOLUTION INTRAVENOUS; SUBCUTANEOUS at 09:06

## 2024-04-19 RX ADMIN — MIDAZOLAM 2 MG: 1 INJECTION INTRAMUSCULAR; INTRAVENOUS at 08:19

## 2024-04-19 RX ADMIN — ROCURONIUM BROMIDE 20 MG: 50 INJECTION, SOLUTION INTRAVENOUS at 09:05

## 2024-04-19 RX ADMIN — Medication 100 MCG: at 09:01

## 2024-04-19 RX ADMIN — PROPOFOL 100 MG: 10 INJECTION, EMULSION INTRAVENOUS at 08:24

## 2024-04-19 RX ADMIN — Medication 100 MCG: at 10:01

## 2024-04-19 RX ADMIN — Medication 100 MCG: at 10:06

## 2024-04-19 ASSESSMENT — PAIN - FUNCTIONAL ASSESSMENT: PAIN_FUNCTIONAL_ASSESSMENT: 0-10

## 2024-04-19 NOTE — DISCHARGE INSTRUCTIONS
appliances.  Do not drink alcohol; including beer or wine.  Do not make any important decisions or sign any important papers.  For the next 24 hours you can expect drowsiness, light-headed or dizziness, nausea/ vomiting, inability to concentrate, fatigue and desire to sleep.  We strongly suggest that a responsible adult be with for the next 24 hours, for your protection and safety.  You are not allowed to drive yourself home, or take any type of public transportation.      Cardiac Rehab: If your doctor recommends Cardiac rehab, you will receive a call from that department to schedule your 1st appointment.    Your Care Instructions  There are many things that cause chest pain. Some are not serious and will get better on their own in a few days. But some kinds of chest pain need more testing and treatment. Your doctor may have recommended follow-up visit in the next 4 to 8 weeks. If you are not feeling better, you may need more tests or treatment. Even though your doctor has released you, you still need to watch for any problems. The doctor carefully checked you, but sometimes problems can develop later. If you have new symptoms or if your symptoms do not improve, get medical help right away. If you have worse or different chest pain or pressure that lasts more than 5 minutes or you passed out (lost consciouess), call 911 or seek other emergency help right away.  A medical visit is only one step in your treatment. Even if you feel better, you still need to do what your doctor recommends, such as going to all suggested follow-up appointments and taking medications exactly as directed. This will help you recover and help prevent future problems.  How can you care for yourself at home?  Rest until you feel better.  Take your medicine exactly as prescribed. Call your doctor if you think you are having problems with your medicine.  Do not drive after taking a prescription pain medication.  When should you call for

## 2024-04-19 NOTE — PROGRESS NOTES
Clothing changed/got dressed. Up to restroom. Tolerated fine.  IV's removed. Right groin site remained soft/dry/dressing intact. Vitals stable on room air. BP remained softer side, which is patient's normal. Discharge instructions discussed with patient and his girlfriend Concetta. Given Eliquis at 1400 per instructions. No bleeding afterwards. Also given Protonix 30 mins prior to Eliquis. Patient tolerating fluids and crackers. Concetta went to  Omeprazole from pharmacy. Wheeled to front entrance where Concetta arrived to drive him home.

## 2024-04-19 NOTE — ANESTHESIA PRE PROCEDURE
hyperlipidemia 01/15/2020   • Observed sleep apnea 01/17/2024   • PAF (paroxysmal atrial fibrillation) (HCC) 09/14/2023   • Sleep apnea        Past Surgical History:        Procedure Laterality Date   • BREAST BIOPSY     • CARDIOVERSION  3/7/2024   • COLONOSCOPY      repeat in 2022   • COLONOSCOPY N/A 8/22/2023    COLONOSCOPY performed by Krystle Perez MD at [facility] ASC ENDOSCOPY   • US THYROID BIOPSY  8/16/2021    US THYROID BIOPSY 8/16/2021 [facility] ULTRASOUND   • VASECTOMY         Social History:    Social History     Tobacco Use   • Smoking status: Never     Passive exposure: Past   • Smokeless tobacco: Never   Substance Use Topics   • Alcohol use: Not Currently     Alcohol/week: 8.0 standard drinks of alcohol     Types: 8 Cans of beer per week     Comment: weekends                                Counseling given: Not Answered      Vital Signs (Current):   Vitals:    04/19/24 0640   Weight: 101.6 kg (224 lb)   Height: 1.854 m (6' 1\")                                              BP Readings from Last 3 Encounters:   03/26/24 94/62   03/22/24 91/67   03/13/24 90/60       NPO Status:                                                                                 BMI:   Wt Readings from Last 3 Encounters:   04/19/24 101.6 kg (224 lb)   03/26/24 102.5 kg (226 lb)   03/22/24 102.1 kg (225 lb)     Body mass index is 29.55 kg/m².    CBC:   Lab Results   Component Value Date/Time    WBC 7.2 04/19/2024 07:00 AM    RBC 5.32 04/19/2024 07:00 AM    HGB 15.7 04/19/2024 07:00 AM    HCT 46.8 04/19/2024 07:00 AM    MCV 88.0 04/19/2024 07:00 AM    RDW 15.0 04/19/2024 07:00 AM     04/19/2024 07:00 AM       CMP:   Lab Results   Component Value Date/Time     03/10/2024 04:51 AM    K 4.2 03/10/2024 04:51 AM     03/10/2024 04:51 AM    CO2 22 03/10/2024 04:51 AM    BUN 19 03/10/2024 04:51 AM    CREATININE 0.8 03/10/2024 04:51 AM    GFRAA >60 08/23/2022 08:38 AM    AGRATIO 1.5 03/03/2024 08:40 PM    LABGLOM >60 03/10/2024

## 2024-04-19 NOTE — H&P
[facility]          Electrophysiology H&P Note       Date of Procedure: 4/19/2024  Patient's Name: Weston Edmond  YOB: 1965   Medical Record Number: [id number]    Indication:  Atrial fibrillation   Ablation     Consent:   I have discussed with the patient and/or the patient representative the indication, alternatives, and the possible risks and/or complications of the planned procedure and the anesthesia methods. The patient and/or patient representative appear to understand and agree to proceed.    Past Medical History:   Diagnosis Date    Arthritis     Charcot-Liliana-Tooth disease     CHF (congestive heart failure) ([facility])     Class 1 obesity due to excess calories with body mass index (BMI) of 33.0 to 33.9 in adult 01/17/2024    Coronary artery disease involving native coronary artery of native heart with angina pectoris ([facility]) 3/8/2024    Hyperlipemia     Hypertension     Mixed hyperlipidemia 01/15/2020    Observed sleep apnea 01/17/2024    PAF (paroxysmal atrial fibrillation) ([facility]) 09/14/2023    Sleep apnea        Past Surgical History:   Procedure Laterality Date    BREAST BIOPSY      CARDIOVERSION  3/7/2024    COLONOSCOPY      repeat in 2022    COLONOSCOPY N/A 8/22/2023    COLONOSCOPY performed by Krystle Perez MD at [facility] ASC ENDOSCOPY    US THYROID BIOPSY  8/16/2021    US THYROID BIOPSY 8/16/2021 [facility] ULTRASOUND    VASECTOMY         Prior to Admission medications    Medication Sig Start Date End Date Taking? Authorizing Provider   amiodarone (CORDARONE) 200 MG tablet Take 1 tablet by mouth daily 3/26/24   Erlin Brennan MD   metoprolol succinate (TOPROL XL) 25 MG extended release tablet Take 1 tablet by mouth in the morning and at bedtime 3/10/24   Sylvia Philip, GERRY - CNP   apixaban (ELIQUIS) 5 MG TABS tablet Take 1 tablet by mouth 2 times daily 3/9/24   John Gracia MD   empagliflozin (JARDIANCE) 10 MG tablet Take 1 tablet by mouth daily 3/10/24   John Gracia,

## 2024-04-23 NOTE — ANESTHESIA POSTPROCEDURE EVALUATION
Additional Notes: Patient consent was obtained to proceed with the visit and recommended plan of care after discussion of all risks and benefits, including the risks of COVID-19 exposure.
Department of Anesthesiology  Postprocedure Note    Patient: Weston Edmond  MRN: [id number]  YOB: 1965  Date of evaluation: 4/23/2024    Procedure Summary       Date: 04/19/24 Room / Location: [facility] Cardiac Cath Lab    Anesthesia Start: 0819 Anesthesia Stop: 1103    Procedure: ABLATION Diagnosis: Paroxysmal atrial fibrillation    Scheduled Providers: Dominic Morrison MD; Bruce Clifford APRN - CRNA Responsible Provider: Dominic Morrison MD    Anesthesia Type: general ASA Status: 3            Anesthesia Type: No value filed.    Cyrus Phase I: Cyrus Score: 10    Cyrus Phase II:      Anesthesia Post Evaluation    Patient location during evaluation: PACU  Patient participation: complete - patient participated  Level of consciousness: awake and alert  Pain score: 0  Airway patency: patent  Nausea & Vomiting: no nausea and no vomiting  Cardiovascular status: blood pressure returned to baseline  Respiratory status: acceptable  Hydration status: stable  Pain management: adequate    No notable events documented.  
Detail Level: Simple

## 2024-04-25 LAB
POC ACT LR: 303 SEC
POC ACT LR: 314 SEC
POC ACT LR: >400 SEC
POC ACT LR: >400 SEC

## 2024-05-06 ENCOUNTER — OFFICE VISIT (OUTPATIENT)
Dept: CARDIOLOGY CLINIC | Age: 59
End: 2024-05-06
Payer: COMMERCIAL

## 2024-05-06 VITALS
WEIGHT: 224 LBS | OXYGEN SATURATION: 96 % | DIASTOLIC BLOOD PRESSURE: 70 MMHG | HEART RATE: 94 BPM | SYSTOLIC BLOOD PRESSURE: 100 MMHG | BODY MASS INDEX: 29.69 KG/M2 | HEIGHT: 73 IN

## 2024-05-06 DIAGNOSIS — I48.0 PAF (PAROXYSMAL ATRIAL FIBRILLATION) (HCC): ICD-10-CM

## 2024-05-06 DIAGNOSIS — I25.5 ISCHEMIC CARDIOMYOPATHY: ICD-10-CM

## 2024-05-06 DIAGNOSIS — I50.20 HFREF (HEART FAILURE WITH REDUCED EJECTION FRACTION) (HCC): Primary | ICD-10-CM

## 2024-05-06 PROCEDURE — 99214 OFFICE O/P EST MOD 30 MIN: CPT | Performed by: NURSE PRACTITIONER

## 2024-05-06 PROCEDURE — 3074F SYST BP LT 130 MM HG: CPT | Performed by: NURSE PRACTITIONER

## 2024-05-06 PROCEDURE — 3078F DIAST BP <80 MM HG: CPT | Performed by: NURSE PRACTITIONER

## 2024-05-06 RX ORDER — SPIRONOLACTONE 25 MG/1
25 TABLET ORAL DAILY
Qty: 90 TABLET | Refills: 3 | Status: SHIPPED | OUTPATIENT
Start: 2024-05-11

## 2024-05-06 RX ORDER — FUROSEMIDE 20 MG/1
20 TABLET ORAL SEE ADMIN INSTRUCTIONS
Qty: 60 TABLET | Refills: 3
Start: 2024-05-06

## 2024-05-06 NOTE — PROGRESS NOTES
ejection fraction of 20 - 25   %.   There is global hypokinesis with regional variations.   Left ventricular diastolic filling pressure is elevated.   Changes noted from previous echo on 9- in left ventricular function.   Mild mitral and pulmonic regurgitation.   The left atrium is moderately dilated.   Bi-atrial enlargement.   Right ventricular systolic function is mild to moderately reduced .   Systolic pulmonic artery pressure (SPAP) is normal estimated at 34 mmHg   (Right atrial pressure of 8 mmHg).   The right atrium is mildly dilated.     ALICIA 3/7/2024  Summary   ALICIA performed by Dr. Mixno.   Ejection fraction is visually estimated at 20-25%.   Mild spontaneous echo contrast was present in LA cavity and LA appendage.   There is no evidence of mass or thrombus in the left atrium or appendage.   A bubble study was performed and showed no evidence of shunting.   Mild mitral regurgitation.     Left heart cath 3/11/2024  CONCLUSIONS:     1.  Successful IVUS guided drug-eluting stent insertion to the right coronary artery and circumflex coronary artery.  ASSESSMENT/RECOMMENDATIONS:  1.  Recommend dual antiplatelet therapy with aspirin 81 mg and Brilinta 90 mg twice daily indefinitely.  2.  With the patient's underlying paroxysmal atrial fibrillation, he will require oral anticoagulation.  We can consider single antiplatelet drug therapy and oral anticoagulation after 30 days.    Procedure Dr. Dahl 3/2024  Assessment:   Successful external DC cardioversion of atrial fibrillation  Recurrence of atrial fibrillation during the observation period in cath lab         Core measures for HFrEF:  EF: 20-25%   ICD: NA undergoing med titration   ACEi/ARB/ARNI: sacubitril/valsartan   BB: Metoprolol succinate  Sunny: Spironolactone   SGLT2: Jardiance  Hydralazine/nitrates:    NYHA:                        Class I:  No limitation of physical activity.  ACC/ AHA Stage:      Stage C:  Structural heart disease with prior or

## 2024-05-06 NOTE — PATIENT INSTRUCTIONS
Plan:   Daily weight  Change lasix to as needed for weight gain 3lbs in a day or worse leg swelling/abd swelling starting this Saturday  Increase the Spironolactone (aldactone) 25mg daily starting this Saturday  Continue entresto and toprol  Continue jardiance  Check BMP, BNP in about 2 week  Follow up with Dr. Brennan as planned for medication titration; consider increasing entresto if b/p tolerates   Follow up with CHF team end of July; will plan to repeat echo in August.

## 2024-05-23 NOTE — PROGRESS NOTES
[address] [facility] coding opportunities       Chart reviewed, no opportunity found: CHART REVIEWED, NO OPPORTUNITY FOUND        Patients Insurance     Medicare Insurance: Medicare
2022  These results are not intended for use in patients  <18 years of age.  eGFR results are calculated without  a race factor using the 2021 CKD-EPI equation.  Careful  clinical correlation is recommended, particularly when  comparing to results calculated using previous equations.  The CKD-EPI equation is less accurate in patients with  extremes of muscle mass, extra-renal metabolism of  creatinine, excessive creatinine ingestion, or following  therapy that affects renal tubular secretion.     03/10/2024 >60 >60 Final     Comment:     Pediatric calculator link  https://www.kidney.org/professionals/kdoqi/gfr_calculatorped  Effective Oct 3, 2022  These results are not intended for use in patients  <18 years of age.  eGFR results are calculated without  a race factor using the 2021 CKD-EPI equation.  Careful  clinical correlation is recommended, particularly when  comparing to results calculated using previous equations.  The CKD-EPI equation is less accurate in patients with  extremes of muscle mass, extra-renal metabolism of  creatinine, excessive creatinine ingestion, or following  therapy that affects renal tubular secretion.     03/09/2024 >60 >60 Final     Comment:     Pediatric calculator link  https://www.kidney.org/professionals/kdoqi/gfr_calculatorped  Effective Oct 3, 2022  These results are not intended for use in patients  <18 years of age.  eGFR results are calculated without  a race factor using the 2021 CKD-EPI equation.  Careful  clinical correlation is recommended, particularly when  comparing to results calculated using previous equations.  The CKD-EPI equation is less accurate in patients with  extremes of muscle mass, extra-renal metabolism of  creatinine, excessive creatinine ingestion, or following  therapy that affects renal tubular secretion.       Glucose   Date Value Ref Range Status   04/19/2024 104 (H) 70 - 99 mg/dL Final   03/10/2024 93 70 - 99 mg/dL Final   03/09/2024 105 (H) 70 - 99

## 2024-05-24 ENCOUNTER — OFFICE VISIT (OUTPATIENT)
Dept: CARDIOLOGY CLINIC | Age: 59
End: 2024-05-24
Payer: COMMERCIAL

## 2024-05-24 VITALS
WEIGHT: 223.8 LBS | BODY MASS INDEX: 29.66 KG/M2 | OXYGEN SATURATION: 98 % | DIASTOLIC BLOOD PRESSURE: 60 MMHG | HEIGHT: 73 IN | SYSTOLIC BLOOD PRESSURE: 100 MMHG | HEART RATE: 76 BPM

## 2024-05-24 DIAGNOSIS — I48.0 PAF (PAROXYSMAL ATRIAL FIBRILLATION) (HCC): Primary | ICD-10-CM

## 2024-05-24 DIAGNOSIS — I42.9 CARDIOMYOPATHY, UNSPECIFIED TYPE (HCC): ICD-10-CM

## 2024-05-24 DIAGNOSIS — I48.0 PAF (PAROXYSMAL ATRIAL FIBRILLATION) (HCC): ICD-10-CM

## 2024-05-24 DIAGNOSIS — I25.5 ISCHEMIC CARDIOMYOPATHY: ICD-10-CM

## 2024-05-24 DIAGNOSIS — I48.91 ATRIAL FIBRILLATION WITH RVR (HCC): ICD-10-CM

## 2024-05-24 LAB
ANION GAP SERPL CALCULATED.3IONS-SCNC: 11 MMOL/L (ref 3–16)
BUN SERPL-MCNC: 19 MG/DL (ref 7–20)
CALCIUM SERPL-MCNC: 9.2 MG/DL (ref 8.3–10.6)
CHLORIDE SERPL-SCNC: 107 MMOL/L (ref 99–110)
CO2 SERPL-SCNC: 25 MMOL/L (ref 21–32)
CREAT SERPL-MCNC: 0.9 MG/DL (ref 0.9–1.3)
GFR SERPLBLD CREATININE-BSD FMLA CKD-EPI: >90 ML/MIN/{1.73_M2}
GLUCOSE SERPL-MCNC: 77 MG/DL (ref 70–99)
NT-PROBNP SERPL-MCNC: 496 PG/ML (ref 0–124)
POTASSIUM SERPL-SCNC: 4.6 MMOL/L (ref 3.5–5.1)
SODIUM SERPL-SCNC: 143 MMOL/L (ref 136–145)

## 2024-05-24 PROCEDURE — 93000 ELECTROCARDIOGRAM COMPLETE: CPT

## 2024-05-24 PROCEDURE — 3078F DIAST BP <80 MM HG: CPT

## 2024-05-24 PROCEDURE — 99214 OFFICE O/P EST MOD 30 MIN: CPT

## 2024-05-24 PROCEDURE — 3074F SYST BP LT 130 MM HG: CPT

## 2024-05-24 NOTE — PATIENT INSTRUCTIONS
Continue Eliquis 5 mg twice daily for stroke risk reduction   Continue amiodarone 200 mg twice daily--for a total of 3 months post ablation (til 7/19/2024)  Continue Toprol-XL 25 mg twice daily  Continue Lasix 20 mg -as needed for 3 pound weight gain in 24 hours or 5 pound weight gain in 1 week  Continue Aldactone 25 mg daily  Continue Jardiance 10 mg daily   Continue Entresto 24-26 mg twice daily  Continue Lipitor 40 mg daily  Continue Brilinta 90 mg twice daily  Limited echo in August 2024--Call [facility]    Follow up in September 2024 Sylvia VELASCO

## 2024-05-28 ENCOUNTER — TELEPHONE (OUTPATIENT)
Dept: CARDIOLOGY CLINIC | Age: 59
End: 2024-05-28

## 2024-05-28 NOTE — TELEPHONE ENCOUNTER
----- Message from GERRY Burton CNP sent at 5/28/2024 12:43 PM EDT -----  Please notify patient results. Kidney function is stable. Electrolytes are stable.   Pro-BNP (heart failure number) is significantly improved!  Continue current regimen.

## 2024-05-28 NOTE — TELEPHONE ENCOUNTER
Created telephone encounter. Spoke with Weston relayed message per NPRB regarding labs. Pt verbalized understanding.

## 2024-05-31 RX ORDER — METOPROLOL SUCCINATE 25 MG/1
TABLET, EXTENDED RELEASE ORAL
Qty: 180 TABLET | Refills: 3 | Status: SHIPPED | OUTPATIENT
Start: 2024-05-31

## 2024-05-31 RX ORDER — ATORVASTATIN CALCIUM 40 MG/1
40 TABLET, FILM COATED ORAL DAILY
Qty: 30 TABLET | Refills: 3 | Status: SHIPPED | OUTPATIENT
Start: 2024-05-31

## 2024-06-04 ENCOUNTER — OFFICE VISIT (OUTPATIENT)
Dept: PULMONOLOGY | Age: 59
End: 2024-06-04
Payer: COMMERCIAL

## 2024-06-04 VITALS
RESPIRATION RATE: 16 BRPM | OXYGEN SATURATION: 97 % | HEIGHT: 73 IN | DIASTOLIC BLOOD PRESSURE: 67 MMHG | HEART RATE: 74 BPM | WEIGHT: 222.05 LBS | BODY MASS INDEX: 29.43 KG/M2 | TEMPERATURE: 97.7 F | SYSTOLIC BLOOD PRESSURE: 100 MMHG

## 2024-06-04 DIAGNOSIS — I48.0 PAF (PAROXYSMAL ATRIAL FIBRILLATION) (HCC): Primary | ICD-10-CM

## 2024-06-04 DIAGNOSIS — I42.0 DILATED CARDIOMYOPATHY (HCC): ICD-10-CM

## 2024-06-04 DIAGNOSIS — G47.33 OSA (OBSTRUCTIVE SLEEP APNEA): ICD-10-CM

## 2024-06-04 PROCEDURE — 3074F SYST BP LT 130 MM HG: CPT | Performed by: INTERNAL MEDICINE

## 2024-06-04 PROCEDURE — 99213 OFFICE O/P EST LOW 20 MIN: CPT | Performed by: INTERNAL MEDICINE

## 2024-06-04 PROCEDURE — 3078F DIAST BP <80 MM HG: CPT | Performed by: INTERNAL MEDICINE

## 2024-06-04 NOTE — PROGRESS NOTES
MA Communication:  The following orders are received by verbal communication from Gerardo Rossi MD    Orders include:    1 yr angelika    
Bi-atrial enlargement.   Right ventricular systolic function is mild to moderately reduced .   Systolic pulmonic artery pressure (SPAP) is normal estimated at 34 mmHg   (Right atrial pressure of 8 mmHg).    Patient's compliance data shows that patient use the CPAP machine on 16 days out of 30, patient use of CPAP machine more than 4 hours was 43%, patient has AHI still of 22.9/h along with that patient had significant Cheyne-Mar respiration for 2 hours and 49 minutes on the compliance data which was evaluated    Patient's compliance data shows that patient use the CPAP machine all 30 days, patient uses CPAP machine more than 4 hours 100%, patient's average use was 6 hours and 49 minutes, patient does not have any Cheyne-Mar respiration now but patient's AHI is still for 10.5/h    Assessment:    1.  Obstructive sleep apnea        2. PAF (paroxysmal atrial fibrillation) (HCC)      3.  Dilated cardiomyopathy      4.  Overweight      5. Multiple thyroid nodules    6.  Nocturnal hypoxemia        Plan:   Patient's review of system were discussed  Patient was told about the clinical finding including auscultation and implications  Patient does not have any significant adventitious sounds on auscultation at this time  Patient was told about the sleep study results along with his implications and options  Patient has severe sleep apnea with nocturnal hypoxemia  Implications of severe sleep apnea discussed in detail again  Patient's compliance data was reviewed and patient is compliant with the use of CPAP machine but the efficacious support suboptimal with AHI was still 10.5/h  Patient was told about the options including increasing the pressures on the CPAP machine but patient states that he is comfortable with how things are and he does not want to proceed with that  Patient was told that if he has increasing sleep fragmentation or increasing tiredness to call  Patient was told to take less salt and less fluid in the

## 2024-06-04 NOTE — PATIENT INSTRUCTIONS
Remember to bring a list of pulmonary medications and any CPAP or BiPAP machines to your next appointment with the office.     Please keep all of your future appointments scheduled by [facility] Pulmonary office. Out of respect for other patients and providers, you may be asked to reschedule your appointment if you arrive later than your scheduled appointment time. Appointments cancelled less than 24hrs in advance will be considered a no show. Patients with three missed appointments within 1 year or four missed appointments within 2 years can be dismissed from the practice.     Please be aware that our physicians are required to work in the Intensive Care Unit at [facility].  Your appointment may need to be rescheduled if they are designated to work during your appointment time.      You may receive a survey regarding the care you received during your visit.  Your input is valuable to us.  We encourage you to complete and return your survey.  We hope you will choose us in the future for your healthcare needs.     Pt instructed of all future appointment dates & times, including radiology, labs, procedures & referrals. If procedures were scheduled preparation instructions provided. Instructions on future appointments with [facility] Pulmonary were given.

## 2024-06-24 NOTE — PROGRESS NOTES
[facility]   Cardiac Follow up     Referring Provider:  Mia Alberto PA     Chief Complaint   Patient presents with    3 Month Follow-Up    Hyperlipidemia    Hypertension    Atrial Fibrillation    Cardiomyopathy    Coronary Artery Disease      Weston Edmond   1965    History of Present Illness:    Weston Edmond is a 58 y.o. malew ho is here today for follow up for a past medical history of coronary artery disease- LOKESH left circumflex and RCA 3/2024, ischemic cardiomyopathy, hypertension, hyperlipidemia, sleep apnea, elevated liver enzymes.   He was initially seen by PCP on 8/2023 at that time found to be in atrial fibrillation.  EKG with atrial fibrillation\" controlled rates, heart rate 106.  He was started on metoprolol 25 mg daily.  He also underwent an echo in September which demonstrated normal LVEF with some wall motion abnormalities, subsequent stress test (Jonatan protocol) with excellent METS, stopped for target heart rate, and 9 minutes, no evidence of ischemia but had mildly reduced LVEF of 40 to 45%, referred to general cardiology.   Admitted on 3/3/2024 with CHF. BNP elevated at 6407. CXR showed pulmonary vascular congestion. EKG showed rapid AF, previously diagnosed with plans for ablation. Echo showed an EF of 20-25%. He had an LHC with PCI of the RCA. He had a ALICIA/CV but promptly returned to AF. He went back into SR after amiodarone was started.  he was scheduled for a repeat cardioversion 3/20/2024 but was founf to be in SR, cardioversion cancelled. He underwent a AF ablaltion- 4/19/2024.    Today he states he has been feeling well since his last visit. He monitors his blood pressure at home which is running- /60-65, heart rate 60's. No dizziness or being light headed with lower blood pressures. He states he has not needed to take Lasix. Patient currently denies any weight gain, edema, palpitations, chest pain, shortness of breath, dizziness, and syncope. He is now on

## 2024-06-25 ENCOUNTER — OFFICE VISIT (OUTPATIENT)
Dept: CARDIOLOGY CLINIC | Age: 59
End: 2024-06-25
Payer: COMMERCIAL

## 2024-06-25 VITALS
HEIGHT: 73 IN | DIASTOLIC BLOOD PRESSURE: 60 MMHG | BODY MASS INDEX: 29.29 KG/M2 | OXYGEN SATURATION: 98 % | WEIGHT: 221 LBS | HEART RATE: 70 BPM | SYSTOLIC BLOOD PRESSURE: 96 MMHG

## 2024-06-25 DIAGNOSIS — I25.119 CORONARY ARTERY DISEASE INVOLVING NATIVE CORONARY ARTERY OF NATIVE HEART WITH ANGINA PECTORIS (HCC): ICD-10-CM

## 2024-06-25 DIAGNOSIS — I25.5 ISCHEMIC CARDIOMYOPATHY: ICD-10-CM

## 2024-06-25 DIAGNOSIS — I48.0 PAF (PAROXYSMAL ATRIAL FIBRILLATION) (HCC): ICD-10-CM

## 2024-06-25 DIAGNOSIS — E78.2 MIXED HYPERLIPIDEMIA: ICD-10-CM

## 2024-06-25 DIAGNOSIS — I10 ESSENTIAL HYPERTENSION: Primary | ICD-10-CM

## 2024-06-25 PROCEDURE — 99214 OFFICE O/P EST MOD 30 MIN: CPT | Performed by: INTERNAL MEDICINE

## 2024-06-25 PROCEDURE — 3074F SYST BP LT 130 MM HG: CPT | Performed by: INTERNAL MEDICINE

## 2024-06-25 PROCEDURE — 3078F DIAST BP <80 MM HG: CPT | Performed by: INTERNAL MEDICINE

## 2024-06-25 NOTE — PATIENT INSTRUCTIONS
Plan:  ~Stop Brilinta when you are finished with what you have at home   ~Start Plavix 75 mg daily after you stop Brilinta    ~Call us one week prior to stopping Brilinta so we can send through a script. For your first dose of Plavix- you will take 4 pills which is 300 mg     Cardiac medications reviewed including indications and pertinent side effects. Medication list updated at this visit.   Patient verbalizes understanding of the need for treatment and education has been provided at today's visit. Additional education material will be provided in after visit summary.    Check blood pressure and heart rate at home a few times per week- keep a log with dates and times and bring to office visit   Regular exercise and following a healthy diet encouraged   Follow up with me in November   ~Echocardiogram as scheduled August 6th   ~Move Lorna's appointment to after echocardiogram

## 2024-07-16 RX ORDER — OMEPRAZOLE 20 MG/1
40 CAPSULE, DELAYED RELEASE ORAL
Qty: 180 CAPSULE | Refills: 3 | OUTPATIENT
Start: 2024-07-16

## 2024-07-16 NOTE — TELEPHONE ENCOUNTER
CM REASSESSMENT NOTE: PATIENT IS DISCHARGING TODAY-[facility] AT BEDSIDE TO
DRIVE HER HOME. CM ASKED WHERE HER PORTABLE OXYGEN WAS AND SHE STATED SHE DOES
NOT USE IT ALL THE TIME AND DID NOT NEED IT TO GO HOME ON. PATIENT REF. HOME
HEALTH OR ANY OTHER NEEDS FOR DISCHARGE. Spoke with pt and he does not want to continue taking medication

## 2024-07-20 ENCOUNTER — APPOINTMENT (OUTPATIENT)
Dept: GENERAL RADIOLOGY | Age: 59
End: 2024-07-20
Payer: COMMERCIAL

## 2024-07-20 ENCOUNTER — OFFICE VISIT (OUTPATIENT)
Age: 59
End: 2024-07-20

## 2024-07-20 ENCOUNTER — HOSPITAL ENCOUNTER (INPATIENT)
Age: 59
LOS: 4 days | Discharge: HOME | End: 2024-07-24
Attending: EMERGENCY MEDICINE | Admitting: INTERNAL MEDICINE
Payer: COMMERCIAL

## 2024-07-20 VITALS
HEIGHT: 74 IN | DIASTOLIC BLOOD PRESSURE: 81 MMHG | BODY MASS INDEX: 27.85 KG/M2 | TEMPERATURE: 96.9 F | OXYGEN SATURATION: 97 % | HEART RATE: 67 BPM | SYSTOLIC BLOOD PRESSURE: 132 MMHG | WEIGHT: 217 LBS

## 2024-07-20 DIAGNOSIS — R73.9 HYPERGLYCEMIA: ICD-10-CM

## 2024-07-20 DIAGNOSIS — S90.414A INFECTED ABRASION OF SECOND TOE OF RIGHT FOOT, INITIAL ENCOUNTER: Primary | ICD-10-CM

## 2024-07-20 DIAGNOSIS — M79.674 PAIN OF TOE OF RIGHT FOOT: ICD-10-CM

## 2024-07-20 DIAGNOSIS — L03.031 CELLULITIS OF SECOND TOE OF RIGHT FOOT: Primary | ICD-10-CM

## 2024-07-20 DIAGNOSIS — L08.9 INFECTED ABRASION OF SECOND TOE OF RIGHT FOOT, INITIAL ENCOUNTER: Primary | ICD-10-CM

## 2024-07-20 DIAGNOSIS — G62.9 PERIPHERAL POLYNEUROPATHY: ICD-10-CM

## 2024-07-20 DIAGNOSIS — M86.8X7 OTHER OSTEOMYELITIS OF RIGHT FOOT (HCC): ICD-10-CM

## 2024-07-20 DIAGNOSIS — L08.9 RIGHT FOOT INFECTION: ICD-10-CM

## 2024-07-20 LAB
ALBUMIN SERPL-MCNC: 4.3 G/DL (ref 3.4–5)
ALBUMIN/GLOB SERPL: 1.2 {RATIO} (ref 1.1–2.2)
ALP SERPL-CCNC: 85 U/L (ref 40–129)
ALT SERPL-CCNC: 21 U/L (ref 10–40)
ANION GAP SERPL CALCULATED.3IONS-SCNC: 11 MMOL/L (ref 3–16)
AST SERPL-CCNC: 20 U/L (ref 15–37)
BASOPHILS # BLD: 0 K/UL (ref 0–0.2)
BASOPHILS NFR BLD: 0.5 %
BILIRUB SERPL-MCNC: 0.8 MG/DL (ref 0–1)
BUN SERPL-MCNC: 17 MG/DL (ref 7–20)
CALCIUM SERPL-MCNC: 9.1 MG/DL (ref 8.3–10.6)
CHLORIDE SERPL-SCNC: 101 MMOL/L (ref 99–110)
CO2 SERPL-SCNC: 24 MMOL/L (ref 21–32)
CREAT SERPL-MCNC: 0.8 MG/DL (ref 0.9–1.3)
CRP SERPL-MCNC: 92.1 MG/L (ref 0–5.1)
DEPRECATED RDW RBC AUTO: 13.8 % (ref 12.4–15.4)
EOSINOPHIL # BLD: 0 K/UL (ref 0–0.6)
EOSINOPHIL NFR BLD: 0.2 %
ERYTHROCYTE [SEDIMENTATION RATE] IN BLOOD BY WESTERGREN METHOD: 33 MM/HR (ref 0–20)
GFR SERPLBLD CREATININE-BSD FMLA CKD-EPI: >90 ML/MIN/{1.73_M2}
GLUCOSE SERPL-MCNC: 172 MG/DL (ref 70–99)
HCT VFR BLD AUTO: 40.3 % (ref 40.5–52.5)
HGB BLD-MCNC: 13.5 G/DL (ref 13.5–17.5)
LACTATE BLDV-SCNC: 1.3 MMOL/L (ref 0.4–2)
LYMPHOCYTES # BLD: 0.8 K/UL (ref 1–5.1)
LYMPHOCYTES NFR BLD: 10.8 %
MCH RBC QN AUTO: 31.4 PG (ref 26–34)
MCHC RBC AUTO-ENTMCNC: 33.6 G/DL (ref 31–36)
MCV RBC AUTO: 93.5 FL (ref 80–100)
MONOCYTES # BLD: 0.7 K/UL (ref 0–1.3)
MONOCYTES NFR BLD: 8.7 %
NEUTROPHILS # BLD: 6.1 K/UL (ref 1.7–7.7)
NEUTROPHILS NFR BLD: 79.8 %
PLATELET # BLD AUTO: 210 K/UL (ref 135–450)
PMV BLD AUTO: 8 FL (ref 5–10.5)
POTASSIUM SERPL-SCNC: 4.5 MMOL/L (ref 3.5–5.1)
PROT SERPL-MCNC: 7.8 G/DL (ref 6.4–8.2)
RBC # BLD AUTO: 4.31 M/UL (ref 4.2–5.9)
SODIUM SERPL-SCNC: 136 MMOL/L (ref 136–145)
WBC # BLD AUTO: 7.7 K/UL (ref 4–11)

## 2024-07-20 PROCEDURE — 85025 COMPLETE CBC W/AUTO DIFF WBC: CPT

## 2024-07-20 PROCEDURE — 6360000002 HC RX W HCPCS

## 2024-07-20 PROCEDURE — 6370000000 HC RX 637 (ALT 250 FOR IP): Performed by: STUDENT IN AN ORGANIZED HEALTH CARE EDUCATION/TRAINING PROGRAM

## 2024-07-20 PROCEDURE — 80053 COMPREHEN METABOLIC PANEL: CPT

## 2024-07-20 PROCEDURE — 73630 X-RAY EXAM OF FOOT: CPT

## 2024-07-20 PROCEDURE — 6370000000 HC RX 637 (ALT 250 FOR IP): Performed by: NURSE PRACTITIONER

## 2024-07-20 PROCEDURE — 6360000002 HC RX W HCPCS: Performed by: STUDENT IN AN ORGANIZED HEALTH CARE EDUCATION/TRAINING PROGRAM

## 2024-07-20 PROCEDURE — 36415 COLL VENOUS BLD VENIPUNCTURE: CPT

## 2024-07-20 PROCEDURE — 87040 BLOOD CULTURE FOR BACTERIA: CPT

## 2024-07-20 PROCEDURE — 1200000000 HC SEMI PRIVATE

## 2024-07-20 PROCEDURE — 99285 EMERGENCY DEPT VISIT HI MDM: CPT

## 2024-07-20 PROCEDURE — 85652 RBC SED RATE AUTOMATED: CPT

## 2024-07-20 PROCEDURE — 96367 TX/PROPH/DG ADDL SEQ IV INF: CPT

## 2024-07-20 PROCEDURE — 83605 ASSAY OF LACTIC ACID: CPT

## 2024-07-20 PROCEDURE — 86140 C-REACTIVE PROTEIN: CPT

## 2024-07-20 PROCEDURE — 96365 THER/PROPH/DIAG IV INF INIT: CPT

## 2024-07-20 PROCEDURE — 2580000003 HC RX 258: Performed by: STUDENT IN AN ORGANIZED HEALTH CARE EDUCATION/TRAINING PROGRAM

## 2024-07-20 PROCEDURE — 2580000003 HC RX 258

## 2024-07-20 RX ORDER — SODIUM CHLORIDE, SODIUM LACTATE, POTASSIUM CHLORIDE, AND CALCIUM CHLORIDE .6; .31; .03; .02 G/100ML; G/100ML; G/100ML; G/100ML
1000 INJECTION, SOLUTION INTRAVENOUS ONCE
Status: COMPLETED | OUTPATIENT
Start: 2024-07-20 | End: 2024-07-20

## 2024-07-20 RX ORDER — ATORVASTATIN CALCIUM 40 MG/1
40 TABLET, FILM COATED ORAL DAILY
Status: DISCONTINUED | OUTPATIENT
Start: 2024-07-21 | End: 2024-07-24 | Stop reason: HOSPADM

## 2024-07-20 RX ORDER — POLYETHYLENE GLYCOL 3350 17 G/17G
17 POWDER, FOR SOLUTION ORAL DAILY PRN
Status: DISCONTINUED | OUTPATIENT
Start: 2024-07-20 | End: 2024-07-24 | Stop reason: HOSPADM

## 2024-07-20 RX ORDER — POTASSIUM CHLORIDE 7.45 MG/ML
10 INJECTION INTRAVENOUS PRN
Status: DISCONTINUED | OUTPATIENT
Start: 2024-07-20 | End: 2024-07-24 | Stop reason: HOSPADM

## 2024-07-20 RX ORDER — PANTOPRAZOLE SODIUM 40 MG/1
40 TABLET, DELAYED RELEASE ORAL
Status: DISCONTINUED | OUTPATIENT
Start: 2024-07-21 | End: 2024-07-24 | Stop reason: HOSPADM

## 2024-07-20 RX ORDER — AMIODARONE HYDROCHLORIDE 200 MG/1
200 TABLET ORAL DAILY
Status: DISCONTINUED | OUTPATIENT
Start: 2024-07-20 | End: 2024-07-24 | Stop reason: HOSPADM

## 2024-07-20 RX ORDER — SODIUM CHLORIDE 0.9 % (FLUSH) 0.9 %
5-40 SYRINGE (ML) INJECTION EVERY 12 HOURS SCHEDULED
Status: DISCONTINUED | OUTPATIENT
Start: 2024-07-20 | End: 2024-07-24 | Stop reason: HOSPADM

## 2024-07-20 RX ORDER — ACETAMINOPHEN 650 MG/1
650 SUPPOSITORY RECTAL EVERY 6 HOURS PRN
Status: DISCONTINUED | OUTPATIENT
Start: 2024-07-20 | End: 2024-07-24 | Stop reason: HOSPADM

## 2024-07-20 RX ORDER — METOPROLOL SUCCINATE 25 MG/1
25 TABLET, EXTENDED RELEASE ORAL DAILY
Status: DISCONTINUED | OUTPATIENT
Start: 2024-07-20 | End: 2024-07-20

## 2024-07-20 RX ORDER — ACETAMINOPHEN 325 MG/1
650 TABLET ORAL EVERY 6 HOURS PRN
Status: DISCONTINUED | OUTPATIENT
Start: 2024-07-20 | End: 2024-07-24 | Stop reason: HOSPADM

## 2024-07-20 RX ORDER — ONDANSETRON 4 MG/1
4 TABLET, ORALLY DISINTEGRATING ORAL EVERY 8 HOURS PRN
Status: DISCONTINUED | OUTPATIENT
Start: 2024-07-20 | End: 2024-07-24 | Stop reason: HOSPADM

## 2024-07-20 RX ORDER — ONDANSETRON 2 MG/ML
4 INJECTION INTRAMUSCULAR; INTRAVENOUS EVERY 6 HOURS PRN
Status: DISCONTINUED | OUTPATIENT
Start: 2024-07-20 | End: 2024-07-24 | Stop reason: HOSPADM

## 2024-07-20 RX ORDER — MAGNESIUM SULFATE IN WATER 40 MG/ML
2000 INJECTION, SOLUTION INTRAVENOUS PRN
Status: DISCONTINUED | OUTPATIENT
Start: 2024-07-20 | End: 2024-07-24 | Stop reason: HOSPADM

## 2024-07-20 RX ORDER — SULFAMETHOXAZOLE AND TRIMETHOPRIM 800; 160 MG/1; MG/1
1 TABLET ORAL 2 TIMES DAILY
Qty: 20 TABLET | Refills: 0 | Status: ON HOLD | OUTPATIENT
Start: 2024-07-20 | End: 2024-07-30

## 2024-07-20 RX ORDER — SODIUM CHLORIDE 0.9 % (FLUSH) 0.9 %
5-40 SYRINGE (ML) INJECTION PRN
Status: DISCONTINUED | OUTPATIENT
Start: 2024-07-20 | End: 2024-07-24 | Stop reason: HOSPADM

## 2024-07-20 RX ORDER — POTASSIUM CHLORIDE 20 MEQ/1
40 TABLET, EXTENDED RELEASE ORAL PRN
Status: DISCONTINUED | OUTPATIENT
Start: 2024-07-20 | End: 2024-07-24 | Stop reason: HOSPADM

## 2024-07-20 RX ORDER — FUROSEMIDE 20 MG/1
20 TABLET ORAL SEE ADMIN INSTRUCTIONS
Status: DISCONTINUED | OUTPATIENT
Start: 2024-07-20 | End: 2024-07-21

## 2024-07-20 RX ORDER — SPIRONOLACTONE 25 MG/1
25 TABLET ORAL DAILY
Status: DISCONTINUED | OUTPATIENT
Start: 2024-07-20 | End: 2024-07-24 | Stop reason: HOSPADM

## 2024-07-20 RX ORDER — METOPROLOL SUCCINATE 25 MG/1
25 TABLET, EXTENDED RELEASE ORAL 2 TIMES DAILY
Status: DISCONTINUED | OUTPATIENT
Start: 2024-07-20 | End: 2024-07-24 | Stop reason: HOSPADM

## 2024-07-20 RX ORDER — SODIUM CHLORIDE 9 MG/ML
INJECTION, SOLUTION INTRAVENOUS PRN
Status: DISCONTINUED | OUTPATIENT
Start: 2024-07-20 | End: 2024-07-24 | Stop reason: HOSPADM

## 2024-07-20 RX ADMIN — APIXABAN 5 MG: 5 TABLET, FILM COATED ORAL at 19:58

## 2024-07-20 RX ADMIN — SODIUM CHLORIDE, POTASSIUM CHLORIDE, SODIUM LACTATE AND CALCIUM CHLORIDE 1000 ML: 600; 310; 30; 20 INJECTION, SOLUTION INTRAVENOUS at 13:06

## 2024-07-20 RX ADMIN — METOPROLOL SUCCINATE 25 MG: 25 TABLET, EXTENDED RELEASE ORAL at 20:42

## 2024-07-20 RX ADMIN — CEFEPIME 2000 MG: 2 INJECTION, POWDER, FOR SOLUTION INTRAVENOUS at 19:16

## 2024-07-20 RX ADMIN — VANCOMYCIN HYDROCHLORIDE 1000 MG: 1 INJECTION, POWDER, LYOPHILIZED, FOR SOLUTION INTRAVENOUS at 18:04

## 2024-07-20 RX ADMIN — SACUBITRIL AND VALSARTAN 1 TABLET: 24; 26 TABLET, FILM COATED ORAL at 19:58

## 2024-07-20 RX ADMIN — VANCOMYCIN HYDROCHLORIDE 1000 MG: 1 INJECTION, POWDER, LYOPHILIZED, FOR SOLUTION INTRAVENOUS at 13:47

## 2024-07-20 RX ADMIN — CEFTRIAXONE SODIUM 1000 MG: 1 INJECTION, POWDER, FOR SOLUTION INTRAMUSCULAR; INTRAVENOUS at 13:07

## 2024-07-20 RX ADMIN — SODIUM CHLORIDE, PRESERVATIVE FREE 10 ML: 5 INJECTION INTRAVENOUS at 19:58

## 2024-07-20 ASSESSMENT — LIFESTYLE VARIABLES
HOW OFTEN DO YOU HAVE A DRINK CONTAINING ALCOHOL: NEVER
HOW MANY STANDARD DRINKS CONTAINING ALCOHOL DO YOU HAVE ON A TYPICAL DAY: PATIENT DOES NOT DRINK

## 2024-07-20 ASSESSMENT — PAIN DESCRIPTION - ORIENTATION: ORIENTATION: RIGHT

## 2024-07-20 ASSESSMENT — PAIN DESCRIPTION - DESCRIPTORS: DESCRIPTORS: SHOOTING

## 2024-07-20 ASSESSMENT — PAIN DESCRIPTION - FREQUENCY: FREQUENCY: INTERMITTENT

## 2024-07-20 ASSESSMENT — PAIN DESCRIPTION - LOCATION: LOCATION: FOOT

## 2024-07-20 ASSESSMENT — PAIN - FUNCTIONAL ASSESSMENT: PAIN_FUNCTIONAL_ASSESSMENT: 0-10

## 2024-07-20 ASSESSMENT — PAIN SCALES - GENERAL: PAINLEVEL_OUTOF10: 8

## 2024-07-20 NOTE — H&P
V2.0  History and Physical      Name:  Weston Edmond /Age/Sex: 1965  (58 y.o. male)   MRN & CSN:  [id number] & [id number] Encounter Date/Time: 2024 3:06 PM EDT   Location:   PCP: Mia Alberto PA       Hospital Day: 1    Assessment and Plan:   Wseton Edmond is a 58 y.o. male with a pmh of hyperlipidemia, paroxysmal atrial fibrillation, HFrEF, GERD who presents with Right foot infection    Hospital Problems             Last Modified POA    * (Principal) Right foot infection 2024 Yes       # Right second toe infection with ? Gangrenous changes and cellulitis of foot and distal leg, rule out osteomyelitis: Swelling and ulceration to the second toe, foul-smelling, x-ray no fracture, ESR and CRP elevated, obtain MRI right foot, obtain blood and wound cultures, started on cefepime and vancomycin consult podiatry, if MRI positive consult ID.    # Hyperlipidemia  # Paroxysmal atrial fibrillation  # HFrEF  # GERD  -Continue home medication if no contraindication    Comment: Please note this report has been produced using speech recognition software and may contain errors related to that system including errors in grammar, punctuation, and spelling, as well as words and phrases that may be inappropriate. If there are any questions or concerns please feel free to contact the dictating provider for clarification.       Disposition:   Current Living situation: Home  Expected Disposition: Home  Estimated D/C: TBD    Diet Regular   DVT Prophylaxis [] Lovenox, []  Heparin, [] SCDs, [] Ambulation,  [x] Eliquis, [] Xarelto, [] Coumadin   Code Status Full   Surrogate Decision Maker/ POA Self     Personally reviewed Lab Studies and Imaging     Discussed management of the case with EDP who recommended admission    Imaging that was interpreted personally includes x-ray and results no fracture    Drugs that require monitoring for toxicity include vancomycin and the method of monitoring was

## 2024-07-20 NOTE — PROGRESS NOTES
Pt arrived to room 321 via wheelchair.    Pt A&Ox4, talkative and pleasant.    VSS.    R 2nd toe with small amt of bleeding. Denies pain. Elevated FOB and propped with pillows.    Oriented to room, tv/call light/bed controls.    Call light within reach. Bed side table within reach. Wheels locked. Bed in lowest position. Independent with ADLs; medium fall risk; declined bed alarm.  Pt instructed to call out for assistance. Pt expressed understanding & calls out appropriately.     Admission charting began. All care cont per orders.    Electronically signed by Za Cabrera RN on 7/20/2024 at 6:39 PM

## 2024-07-20 NOTE — PROGRESS NOTES
ER note for admission read. Waiting for pt to be transferred to floor.    Electronically signed by Za Cabrera RN on 7/20/2024 at 6:35 PM

## 2024-07-20 NOTE — PATIENT INSTRUCTIONS
REFERRED TO [facility] ER FOR IV ANTIBIOTICS AND ASSESSMENT OF THE INFECTION IN THE RIGHT TOE AND FOOT.   AFTER SPEAKING WITH MD HI OUR COLLABORATING PHYSICIAN WE AGREED FOR THE PATIENT TO GO TO THE ER FOR FURTHER ASSESSMENT AND TREATMENT.  Due to the rapid swelling, the probable deep tissue injury to the volar aspect of the 2nd toe and the swelling and erythema of the foot, I am sending to the ER for IV antibiotics I will send antibiotics today to the pharmacy to take after your ER visit.   Follow up with your pcp in 7 days if symptoms persist or if symptoms worsen.  Follow up with Podiatrist  New Prescriptions    SULFAMETHOXAZOLE-TRIMETHOPRIM (BACTRIM DS;SEPTRA DS) 800-160 MG PER TABLET    Take 1 tablet by mouth 2 times daily for 10 days

## 2024-07-20 NOTE — ED PROVIDER NOTES
Emergency Department Attending Provider Note  Location: [facility]  ED  7/20/2024     Patient Identification  Weston Edmond is a 58 y.o. male      Weston Edmond was evaluated in the Emergency Department for right second toe swelling and erythema.  He has a history of a wound on his second toe nail for the past few months.  Treated by a prior podiatrist.  Over the past 24 hours this toe has become markedly swollen and erythematous.  He reports chills.     HPI: as noted above    Physical Exam: Temp 99, swollen erythematous right second toe with distal toe wound, no purulence or drainage      Patient seen and evaluated.  Relevant records reviewed.  Patient presents with symptoms noted above.   Concerning for necrotic/infected second toe which does not appear healthy.  Possibly related to A-fib/embolism.  Labs with elevated inflammatory markers.  Will plan for antibiotics, podiatry evaluation    Although initial history and physical exam information was obtained by BILLY/NPP/MD/ (who also dictated a record of this visit), I personally saw the patient and made/approved the management plan and take responsibility for patient management. I, Dr. Babb, am the primary clinician of record.     This chart was generated in part by using Dragon Dictation system and may contain errors related to that system including errors in grammar, punctuation, and spelling, as well as words and phrases that may be inappropriate. If there are any questions or concerns please feel free to contact the dictating provider for clarification.     Yuval Babb MD   Acute Care [facility]       Yuval Babb MD  07/20/24 6125

## 2024-07-20 NOTE — CONSULTS
Consult Placed     Who: geovany  Date:7/20/24  Time:1745     Electronically signed by Maliha Jarrett on 7/20/2024 at 5:47 PM

## 2024-07-20 NOTE — CONSULTS
Pharmacy Note  Vancomycin Consult    Weston Edmond is a 58 y.o. male started on Vancomycin for SSTI X7 days ; consult received from Dr. Torres  to manage therapy. Also receiving the following antibiotics:   - Ceefepime .    Allergies:  Patient has no known allergies.     Tmax: 99    Micro: BC sent     Recent Labs     07/20/24  1238   CREATININE 0.8*       Recent Labs     07/20/24  1250   WBC 7.7       Estimated Creatinine Clearance: 117 mL/min (A) (based on SCr of 0.8 mg/dL (L)).      Intake/Output Summary (Last 24 hours) at 7/20/2024 1828  Last data filed at 7/20/2024 1722  Gross per 24 hour   Intake --   Output 300 ml   Net -300 ml       Wt Readings from Last 1 Encounters:   07/20/24 98.4 kg (217 lb)         Body mass index is 27.86 kg/m².    Loading dose (critically ill or in ICU, require dialysis or renal replacement therapy): Vancomycin 25 mg/kg IVPB x 1 (maximum 2500 mg).  Maintenance dose: 10-20 mg/kg (maximum: 2000 mg/dose and 4500 mg/day) starting at the next dosing interval determined by renal function  Pulse dose: fluctuating renal function, MONTSERRAT, ESRD  CRRT: 7.5-10 mg/kg q12h   Goal Vancomycin trough: 15-20 mcg/mL   Goal Vancomycin AUC: 400-600     Assessment/Plan:  - Pt.received vanc 1000mg X1 ~13:00 in ER, recommended vanc 1000mg X 1 to complete total loading dose of 2000mg X1  followed by 1500 mg IV every 12 hours.  Predictions   PTM49-60 495 mg/L.hr   AUC24,ss 542 mg/L.hr   Probability of AUC24 > 400 83 %   Ctrough,ss 14.9 mg/L   - Will check vanc level on 7/21 ~ 16:00.    Timing of trough level will be determined based on culture results, renal function, and clinical response.     Thank you for the consult.  Eva Lebron/[facility]. 7/20/24 6:30 PM EDT    --------------------------------------------------                                     Vancomycin Progress Note  Day: 2 Indication: SSTI Other Antibiotics: Cefepime     No results for input(s): \"VANCOTROUGH\" in the last 72 hours.  No results for  In process   [facility] has been updated with administration times, serum creatinine levels, and vancomycin blood levels  Current Dosin mg every 12 hours   Calculated AUC: 459 mg/L.hr Predicted Trough: 11.6 mcg/L     Will continue current dose  Will continue to monitor clinical condition and make adjustments to regimen as appropriate   Alexandra Darling, D[place] 2024 8:28 AM    --------------------------------------------------    Vancomycin Day 4  Current Dosin mg q12h    Recent Labs     24  1604   VANCORANDOM 9.2     Recent Labs     24  1238 24  0459   CREATININE 0.8* 0.7*     Estimated Creatinine Clearance: 134 mL/min (A) (based on SCr of 0.7 mg/dL (L)).  Recent Labs     24  1250 24  0459   WBC 7.7 8.1     Cx without growth  Calculated ; Trough 12  Continue with current Vancomycin dosing of 1500 mg q12h  Uriah Roland, [facility] 2024 12:36 PM    -------------------------------------------------------------------------------------------------------------    Vancomycin Day 5  Current Dosin mg q12h     Recent Labs     24  1604   VANCORANDOM 9.2     Recent Labs     24  1530 24  0422   CREATININE 0.8* 0.7*     Estimated Creatinine Clearance: 145 mL/min (A) (based on SCr of 0.7 mg/dL (L)).    Recent Labs     24  1530 24  0422   WBC 8.5 7.5     Blood Cultures: NGTD  MRSA Nasal Probe: Negative  ID Consulted     [facility] has been updated with administration times, serum creatinine levels, and vancomycin blood levels  Current dose: 1500mg every 12 hours  Calculated AUC: 468 mg/L.hr   Predicted Trough: 11.9 mcg/L     Will  continue current dose  Will continue to monitor clinical condition and make adjustments to regimen as appropriate

## 2024-07-20 NOTE — PROGRESS NOTES
Weston Edmond (:  1965) is a 58 y.o. male,New patient, here for evaluation of the following chief complaint(s):  Toe Pain (Infected 2nd toe on right foot, swelling in right ankle, no injury,  )      ASSESSMENT/PLAN:    ICD-10-CM    1. Infected abrasion of second toe of right foot, initial encounter  S90.414A sulfamethoxazole-trimethoprim (BACTRIM DS;SEPTRA DS) 800-160 MG per tablet    L08.9       2. Pain of toe of right foot  M79.674           REFERRED TO [facility] ER FOR IV ANTIBIOTICS AND ASSESSMENT OF THE INFECTION IN THE RIGHT TOE AND FOOT.   AFTER SPEAKING WITH MD HI OUR COLLABORATING PHYSICIAN WE AGREED FOR THE PATIENT TO GO TO THE ER FOR FURTHER ASSESSMENT AND TREATMENT.  Due to the rapid swelling, the probable deep tissue injury to the volar aspect of the 2nd toe and the swelling and erythema of the foot, I am sending to the ER for IV antibiotics I will send antibiotics today to the pharmacy to take after your ER visit.   Follow up with your pcp in 7 days if symptoms persist or if symptoms worsen.  Follow up with Podiatrist  SUBJECTIVE/OBJECTIVE:    History provided by:  Patient   used: No      HPI:   58 y.o. male presents with symptoms of 2nd toe  ongoing since 2 days ago with a lot more swelling and redness at the toe . 3 times a big as usual. Has taken nothing for symptoms.    Vitals:    24 1020   BP: 132/81   Site: Left Upper Arm   Position: Sitting   Cuff Size: Medium Adult   Pulse: 67   Temp: 96.9 °F (36.1 °C)   TempSrc: Temporal   SpO2: 97%   Weight: 98.4 kg (217 lb)   Height: 1.88 m (6' 2\")       Review of Systems    Physical Exam  Vitals and nursing note reviewed.   Constitutional:       Appearance: Normal appearance.   Cardiovascular:      Rate and Rhythm: Normal rate and regular rhythm.      Pulses:           Dorsalis pedis pulses are 2+ on the right side.        Posterior tibial pulses are 2+ on the right side.      Heart sounds: Normal heart sounds.

## 2024-07-20 NOTE — ED NOTES
Patient Name: Weston Edmond  :  1965  58 y.o.  MRN:  [id number]  Preferred Name  na  ED Room #:    Family/Caregiver Present no  Restraints no  Sitter no  Sepsis Risk Score      Situation  Code Status: Prior No additional code details.    Allergies: Patient has no known allergies.  Weight: Patient Vitals for the past 96 hrs (Last 3 readings):   Weight   24 1142 98.4 kg (217 lb)     Arrived from: home  Chief Complaint:   Chief Complaint   Patient presents with    Foot Pain     Patient reports he was seen at urgent care for pain and swelling in right ankle and toe. Reports he could not get into podiatry until August.      Hospital Problem/Diagnosis:  Principal Problem:    Right foot infection  Resolved Problems:    * No resolved hospital problems. *    Imaging:   XR FOOT RIGHT (MIN 3 VIEWS)   Final Result        Abnormal labs:   Abnormal Labs Reviewed   CBC WITH AUTO DIFFERENTIAL - Abnormal; Notable for the following components:       Result Value    Hematocrit 40.3 (*)     Lymphocytes Absolute 0.8 (*)     All other components within normal limits   COMPREHENSIVE METABOLIC PANEL W/ REFLEX TO MG FOR LOW K - Abnormal; Notable for the following components:    Glucose 172 (*)     Creatinine 0.8 (*)     All other components within normal limits   C-REACTIVE PROTEIN - Abnormal; Notable for the following components:    CRP 92.1 (*)     All other components within normal limits   SEDIMENTATION RATE - Abnormal; Notable for the following components:    Sed Rate, Automated 33 (*)     All other components within normal limits     Critical values: no  Intervention for critical value(s): na      Abnormal Assessment Findings: redness swelling rt foot    Background  History:   Past Medical History:   Diagnosis Date    Arthritis     Charcot-Liliana-Tooth disease     CHF (congestive heart failure) ([facility])     Class 1 obesity due to excess calories with body mass index (BMI) of 33.0 to 33.9 in adult 2024

## 2024-07-21 ENCOUNTER — APPOINTMENT (OUTPATIENT)
Dept: MRI IMAGING | Age: 59
End: 2024-07-21
Payer: COMMERCIAL

## 2024-07-21 LAB
ALBUMIN SERPL-MCNC: 3.3 G/DL (ref 3.4–5)
ALBUMIN/GLOB SERPL: 1 {RATIO} (ref 1.1–2.2)
ALP SERPL-CCNC: 74 U/L (ref 40–129)
ALT SERPL-CCNC: 21 U/L (ref 10–40)
ANION GAP SERPL CALCULATED.3IONS-SCNC: 12 MMOL/L (ref 3–16)
AST SERPL-CCNC: 23 U/L (ref 15–37)
BASOPHILS # BLD: 0 K/UL (ref 0–0.2)
BASOPHILS NFR BLD: 0.6 %
BILIRUB SERPL-MCNC: 0.5 MG/DL (ref 0–1)
BUN SERPL-MCNC: 14 MG/DL (ref 7–20)
CALCIUM SERPL-MCNC: 8.6 MG/DL (ref 8.3–10.6)
CHLORIDE SERPL-SCNC: 105 MMOL/L (ref 99–110)
CO2 SERPL-SCNC: 18 MMOL/L (ref 21–32)
CREAT SERPL-MCNC: 0.7 MG/DL (ref 0.9–1.3)
DEPRECATED RDW RBC AUTO: 14 % (ref 12.4–15.4)
EOSINOPHIL # BLD: 0.1 K/UL (ref 0–0.6)
EOSINOPHIL NFR BLD: 1 %
GFR SERPLBLD CREATININE-BSD FMLA CKD-EPI: >90 ML/MIN/{1.73_M2}
GLUCOSE SERPL-MCNC: 89 MG/DL (ref 70–99)
HCT VFR BLD AUTO: 41.9 % (ref 40.5–52.5)
HGB BLD-MCNC: 14.1 G/DL (ref 13.5–17.5)
LYMPHOCYTES # BLD: 1.6 K/UL (ref 1–5.1)
LYMPHOCYTES NFR BLD: 19.5 %
MCH RBC QN AUTO: 32.1 PG (ref 26–34)
MCHC RBC AUTO-ENTMCNC: 33.7 G/DL (ref 31–36)
MCV RBC AUTO: 95.3 FL (ref 80–100)
MONOCYTES # BLD: 0.9 K/UL (ref 0–1.3)
MONOCYTES NFR BLD: 11.2 %
NEUTROPHILS # BLD: 5.5 K/UL (ref 1.7–7.7)
NEUTROPHILS NFR BLD: 67.7 %
PLATELET # BLD AUTO: 197 K/UL (ref 135–450)
PMV BLD AUTO: 7.6 FL (ref 5–10.5)
POTASSIUM SERPL-SCNC: 4.7 MMOL/L (ref 3.5–5.1)
PROT SERPL-MCNC: 6.5 G/DL (ref 6.4–8.2)
RBC # BLD AUTO: 4.4 M/UL (ref 4.2–5.9)
SODIUM SERPL-SCNC: 135 MMOL/L (ref 136–145)
VANCOMYCIN SERPL-MCNC: 9.2 UG/ML
WBC # BLD AUTO: 8.1 K/UL (ref 4–11)

## 2024-07-21 PROCEDURE — 73718 MRI LOWER EXTREMITY W/O DYE: CPT

## 2024-07-21 PROCEDURE — 85025 COMPLETE CBC W/AUTO DIFF WBC: CPT

## 2024-07-21 PROCEDURE — 2580000003 HC RX 258: Performed by: STUDENT IN AN ORGANIZED HEALTH CARE EDUCATION/TRAINING PROGRAM

## 2024-07-21 PROCEDURE — 6370000000 HC RX 637 (ALT 250 FOR IP): Performed by: STUDENT IN AN ORGANIZED HEALTH CARE EDUCATION/TRAINING PROGRAM

## 2024-07-21 PROCEDURE — 1200000000 HC SEMI PRIVATE

## 2024-07-21 PROCEDURE — 36415 COLL VENOUS BLD VENIPUNCTURE: CPT

## 2024-07-21 PROCEDURE — 6360000002 HC RX W HCPCS: Performed by: STUDENT IN AN ORGANIZED HEALTH CARE EDUCATION/TRAINING PROGRAM

## 2024-07-21 PROCEDURE — 80202 ASSAY OF VANCOMYCIN: CPT

## 2024-07-21 PROCEDURE — 80053 COMPREHEN METABOLIC PANEL: CPT

## 2024-07-21 PROCEDURE — 6370000000 HC RX 637 (ALT 250 FOR IP): Performed by: NURSE PRACTITIONER

## 2024-07-21 PROCEDURE — 87641 MR-STAPH DNA AMP PROBE: CPT

## 2024-07-21 RX ORDER — FUROSEMIDE 20 MG/1
20 TABLET ORAL PRN
Status: DISCONTINUED | OUTPATIENT
Start: 2024-07-21 | End: 2024-07-24 | Stop reason: HOSPADM

## 2024-07-21 RX ADMIN — AMIODARONE HYDROCHLORIDE 200 MG: 200 TABLET ORAL at 09:23

## 2024-07-21 RX ADMIN — SACUBITRIL AND VALSARTAN 1 TABLET: 24; 26 TABLET, FILM COATED ORAL at 09:22

## 2024-07-21 RX ADMIN — VANCOMYCIN HYDROCHLORIDE 1500 MG: 10 INJECTION, POWDER, LYOPHILIZED, FOR SOLUTION INTRAVENOUS at 05:24

## 2024-07-21 RX ADMIN — VANCOMYCIN HYDROCHLORIDE 1500 MG: 10 INJECTION, POWDER, LYOPHILIZED, FOR SOLUTION INTRAVENOUS at 18:19

## 2024-07-21 RX ADMIN — METOPROLOL SUCCINATE 25 MG: 25 TABLET, EXTENDED RELEASE ORAL at 20:09

## 2024-07-21 RX ADMIN — SPIRONOLACTONE 25 MG: 25 TABLET ORAL at 09:22

## 2024-07-21 RX ADMIN — CEFEPIME 2000 MG: 2 INJECTION, POWDER, FOR SOLUTION INTRAVENOUS at 07:01

## 2024-07-21 RX ADMIN — METOPROLOL SUCCINATE 25 MG: 25 TABLET, EXTENDED RELEASE ORAL at 09:22

## 2024-07-21 RX ADMIN — PANTOPRAZOLE SODIUM 40 MG: 40 TABLET, DELAYED RELEASE ORAL at 09:22

## 2024-07-21 RX ADMIN — SODIUM CHLORIDE: 9 INJECTION, SOLUTION INTRAVENOUS at 18:18

## 2024-07-21 RX ADMIN — SACUBITRIL AND VALSARTAN 1 TABLET: 24; 26 TABLET, FILM COATED ORAL at 20:09

## 2024-07-21 RX ADMIN — SODIUM CHLORIDE, PRESERVATIVE FREE 10 ML: 5 INJECTION INTRAVENOUS at 09:22

## 2024-07-21 RX ADMIN — ATORVASTATIN CALCIUM 40 MG: 40 TABLET, FILM COATED ORAL at 20:09

## 2024-07-21 NOTE — CONSULTS
Podiatry Consult Note      Reason for Consult:  right 2nd toe infection, osteo  Requesting Physician:  Maria A Torres    Chief Complaint:  right 2nd toe redness and swelling    History of Present Illness:              The patient is a 58 y.o. male with significant past medical history of Charcot Liliana Tooth, CAD, HTN, and hyperlipidemia who presents with worsening redness and swelling to his right 2nd toe for the past several days duration. Pt reports seeing Dr. Messer for foot care in the past and states he has had issues with that right 2nd toenail getting too long. Upon chart review, he was just referred to the ED yesterday following being seen at urgent care given infection noted to the right 2nd toe. Pt denies any current C/F/N/V/SOB/CP. Denies any other complaints at time of encounter.    Past Medical History:        Diagnosis Date    Arthritis     Charcot-Liliana-Tooth disease     CHF (congestive heart failure) ([facility])     Class 1 obesity due to excess calories with body mass index (BMI) of 33.0 to 33.9 in adult 01/17/2024    Coronary artery disease involving native coronary artery of native heart with angina pectoris ([facility]) 3/8/2024    Hyperlipemia     Hypertension     Mixed hyperlipidemia 01/15/2020    Observed sleep apnea 01/17/2024    PAF (paroxysmal atrial fibrillation) ([facility]) 09/14/2023    Sleep apnea        Past Surgical History:        Procedure Laterality Date    BREAST BIOPSY      CARDIOVERSION  3/7/2024    COLONOSCOPY      repeat in 2022    COLONOSCOPY N/A 8/22/2023    COLONOSCOPY performed by Krystle ePrez MD at [facility] ENDOSCOPY    US THYROID BIOPSY  8/16/2021    US THYROID BIOPSY 8/16/2021 [facility] ULTRASOUND    VASECTOMY         Current Medications:    Current Facility-Administered Medications: furosemide (LASIX) tablet 20 mg, 20 mg, Oral, PRN  amiodarone (CORDARONE) tablet 200 mg, 200 mg, Oral, Daily  [Held by provider] apixaban (ELIQUIS) tablet 5 mg, 5 mg, Oral, BID  atorvastatin (LIPITOR)

## 2024-07-21 NOTE — PROGRESS NOTES
Received patient on bed, alert and oriented x4. Vitals are stable. MRI form completed. Brought his home CPAP, RT to set it up. Dressing in place on the second toe right foot, intact and dry.    Call bell within reach. Independent and calls out appropriately. Maintained a calm and comfortable environment. Shift assessment updated and documented.

## 2024-07-21 NOTE — PROGRESS NOTES
Hospital Medicine Progress Note      Date of Admission: 7/20/2024  Hospital Day: 2    Chief Admission Complaint: Right foot     Subjective: Patient reports she started noticing second toe a few days ago and then rapidly progressed with pain in his ankle.  He was trying to get seen by his podiatrist however he could not get an appointment early enough therefore he went to urgent care and then he was recommended to come here.  Patient denies having had any systemic fevers or chills.  He did have some drainage from the toe.        Assessment/Plan:      Current Principal Problem:  Right foot infection    Cellulitis of right second toe  Possible osteomyelitis  HFrEF with no acute exacerbation  Paroxysmal A-fib    Plan    Patient does have purulent cellulitis with evidence of spread of infection to mid tibial region on the right side.  No clinical sign of osteomyelitis however patient does have neuropathy.  Given rapid spread parenteral antibiotic is reasonable however I do not think patient needs cefepime.  I will continue vancomycin for MRSA coverage at this point awaiting MRSA swab result.  If that is negative I will transition to cefazolin.  Podiatry was consulted by night team.  MRI pending to see if there is any evidence of osteomyelitis.  If there is for antibiotic duration recommendation I will consult ID.  I am holding Eliquis this morning in case podiatry is planning for any surgical intervention in case of osteomyelitis.    Physical Exam Performed:      General appearance:  No apparent distress  Respiratory:  Normal respiratory effort.   Cardiovascular:  Regular rate and rhythm.  Abdomen:  Soft, non-tender, non-distended.  Musculoskeletal:  No edema  Neurologic:  Non-focal  Psychiatric:  Alert and oriented    /69   Pulse 78   Temp 98.5 °F (36.9 °C) (Oral)   Resp 16   Ht 1.88 m (6' 2\")   Wt 98.4 kg (217 lb)   SpO2 97%   BMI 27.86 kg/m²     Diet: ADULT DIET; Regular  DVT Prophylaxis: []PPx LMWH

## 2024-07-21 NOTE — PLAN OF CARE
Problem: Pain  Goal: Verbalizes/displays adequate comfort level or baseline comfort level  Outcome: Progressing  Flowsheets (Taken 7/20/2024 2008)  Verbalizes/displays adequate comfort level or baseline comfort level:   Encourage patient to monitor pain and request assistance   Assess pain using appropriate pain scale   Administer analgesics based on type and severity of pain and evaluate response   Implement non-pharmacological measures as appropriate and evaluate response     Problem: Safety - Adult  Goal: Free from fall injury  Outcome: Progressing  Flowsheets (Taken 7/20/2024 2008)  Free From Fall Injury: Instruct family/caregiver on patient safety

## 2024-07-21 NOTE — PLAN OF CARE
Problem: Pain  Goal: Verbalizes/displays adequate comfort level or baseline comfort level  Outcome: Progressing  Flowsheets (Taken 7/21/2024 1031)  Verbalizes/displays adequate comfort level or baseline comfort level:   Encourage patient to monitor pain and request assistance   Assess pain using appropriate pain scale     Problem: Safety - Adult  Goal: Free from fall injury  Outcome: Progressing  Flowsheets (Taken 7/21/2024 1031)  Free From Fall Injury: Instruct family/caregiver on patient safety

## 2024-07-21 NOTE — PROGRESS NOTES
OK for patient to be removed from tele during MRI. Per Alberto Pettit, .  Electronically signed by Radha Flowers RN on 7/21/2024 at 10:46 AM

## 2024-07-21 NOTE — PROGRESS NOTES
Shift assessment completed. Pt A&O x4.  VSS.  Denies Pain. IV site patent/ flushed, and infusing. Patient on RA.  Patient admits to passing gas. Patient ambulates by self  to bathroom.  Medication given per MAR. Eliquis held per MD. Patient ready and aware he is going down for MRI today. Questions were completed over night. Denies any needs at this time. Bed locked and in lowest position.  Call light within reach. Gripper socks applied. Patient in stable condition when RN leaving room.   Electronically signed by Radha Flowers RN on 7/21/2024 at 10:32 AM

## 2024-07-22 ENCOUNTER — ANESTHESIA EVENT (OUTPATIENT)
Dept: OPERATING ROOM | Age: 59
End: 2024-07-22
Payer: COMMERCIAL

## 2024-07-22 ENCOUNTER — APPOINTMENT (OUTPATIENT)
Dept: GENERAL RADIOLOGY | Age: 59
End: 2024-07-22
Payer: COMMERCIAL

## 2024-07-22 ENCOUNTER — ANESTHESIA (OUTPATIENT)
Dept: OPERATING ROOM | Age: 59
End: 2024-07-22
Payer: COMMERCIAL

## 2024-07-22 LAB — MRSA DNA SPEC QL NAA+PROBE: NORMAL

## 2024-07-22 PROCEDURE — 87102 FUNGUS ISOLATION CULTURE: CPT

## 2024-07-22 PROCEDURE — 6360000002 HC RX W HCPCS: Performed by: STUDENT IN AN ORGANIZED HEALTH CARE EDUCATION/TRAINING PROGRAM

## 2024-07-22 PROCEDURE — 87070 CULTURE OTHR SPECIMN AEROBIC: CPT

## 2024-07-22 PROCEDURE — 87206 SMEAR FLUORESCENT/ACID STAI: CPT

## 2024-07-22 PROCEDURE — 2580000003 HC RX 258: Performed by: NURSE ANESTHETIST, CERTIFIED REGISTERED

## 2024-07-22 PROCEDURE — A4217 STERILE WATER/SALINE, 500 ML: HCPCS | Performed by: STUDENT IN AN ORGANIZED HEALTH CARE EDUCATION/TRAINING PROGRAM

## 2024-07-22 PROCEDURE — 87075 CULTR BACTERIA EXCEPT BLOOD: CPT

## 2024-07-22 PROCEDURE — 6360000002 HC RX W HCPCS: Performed by: NURSE ANESTHETIST, CERTIFIED REGISTERED

## 2024-07-22 PROCEDURE — 2709999900 HC NON-CHARGEABLE SUPPLY: Performed by: STUDENT IN AN ORGANIZED HEALTH CARE EDUCATION/TRAINING PROGRAM

## 2024-07-22 PROCEDURE — 87185 SC STD ENZYME DETCJ PER NZM: CPT

## 2024-07-22 PROCEDURE — 87116 MYCOBACTERIA CULTURE: CPT

## 2024-07-22 PROCEDURE — 87076 CULTURE ANAEROBE IDENT EACH: CPT

## 2024-07-22 PROCEDURE — 1200000000 HC SEMI PRIVATE

## 2024-07-22 PROCEDURE — 6370000000 HC RX 637 (ALT 250 FOR IP): Performed by: STUDENT IN AN ORGANIZED HEALTH CARE EDUCATION/TRAINING PROGRAM

## 2024-07-22 PROCEDURE — 73630 X-RAY EXAM OF FOOT: CPT

## 2024-07-22 PROCEDURE — 7100000011 HC PHASE II RECOVERY - ADDTL 15 MIN: Performed by: STUDENT IN AN ORGANIZED HEALTH CARE EDUCATION/TRAINING PROGRAM

## 2024-07-22 PROCEDURE — 2500000003 HC RX 250 WO HCPCS: Performed by: NURSE ANESTHETIST, CERTIFIED REGISTERED

## 2024-07-22 PROCEDURE — 2580000003 HC RX 258: Performed by: STUDENT IN AN ORGANIZED HEALTH CARE EDUCATION/TRAINING PROGRAM

## 2024-07-22 PROCEDURE — 87186 SC STD MICRODIL/AGAR DIL: CPT

## 2024-07-22 PROCEDURE — 87205 SMEAR GRAM STAIN: CPT

## 2024-07-22 PROCEDURE — 88305 TISSUE EXAM BY PATHOLOGIST: CPT

## 2024-07-22 PROCEDURE — 0Y6R0Z2 DETACHMENT AT RIGHT 2ND TOE, MID, OPEN APPROACH: ICD-10-PCS | Performed by: STUDENT IN AN ORGANIZED HEALTH CARE EDUCATION/TRAINING PROGRAM

## 2024-07-22 PROCEDURE — 3600000003 HC SURGERY LEVEL 3 BASE: Performed by: STUDENT IN AN ORGANIZED HEALTH CARE EDUCATION/TRAINING PROGRAM

## 2024-07-22 PROCEDURE — 3700000000 HC ANESTHESIA ATTENDED CARE: Performed by: STUDENT IN AN ORGANIZED HEALTH CARE EDUCATION/TRAINING PROGRAM

## 2024-07-22 PROCEDURE — 87077 CULTURE AEROBIC IDENTIFY: CPT

## 2024-07-22 PROCEDURE — 3700000001 HC ADD 15 MINUTES (ANESTHESIA): Performed by: STUDENT IN AN ORGANIZED HEALTH CARE EDUCATION/TRAINING PROGRAM

## 2024-07-22 PROCEDURE — 7100000010 HC PHASE II RECOVERY - FIRST 15 MIN: Performed by: STUDENT IN AN ORGANIZED HEALTH CARE EDUCATION/TRAINING PROGRAM

## 2024-07-22 PROCEDURE — 88311 DECALCIFY TISSUE: CPT

## 2024-07-22 PROCEDURE — 3600000013 HC SURGERY LEVEL 3 ADDTL 15MIN: Performed by: STUDENT IN AN ORGANIZED HEALTH CARE EDUCATION/TRAINING PROGRAM

## 2024-07-22 PROCEDURE — 6370000000 HC RX 637 (ALT 250 FOR IP): Performed by: NURSE PRACTITIONER

## 2024-07-22 RX ORDER — SODIUM CHLORIDE, SODIUM LACTATE, POTASSIUM CHLORIDE, CALCIUM CHLORIDE 600; 310; 30; 20 MG/100ML; MG/100ML; MG/100ML; MG/100ML
INJECTION, SOLUTION INTRAVENOUS CONTINUOUS PRN
Status: DISCONTINUED | OUTPATIENT
Start: 2024-07-22 | End: 2024-07-22 | Stop reason: SDUPTHER

## 2024-07-22 RX ORDER — DIPHENHYDRAMINE HYDROCHLORIDE 50 MG/ML
6.25 INJECTION INTRAMUSCULAR; INTRAVENOUS
Status: DISCONTINUED | OUTPATIENT
Start: 2024-07-22 | End: 2024-07-22 | Stop reason: HOSPADM

## 2024-07-22 RX ORDER — SODIUM CHLORIDE 0.9 % (FLUSH) 0.9 %
5-40 SYRINGE (ML) INJECTION EVERY 12 HOURS SCHEDULED
Status: DISCONTINUED | OUTPATIENT
Start: 2024-07-22 | End: 2024-07-22 | Stop reason: HOSPADM

## 2024-07-22 RX ORDER — MAGNESIUM HYDROXIDE 1200 MG/15ML
LIQUID ORAL CONTINUOUS PRN
Status: DISCONTINUED | OUTPATIENT
Start: 2024-07-22 | End: 2024-07-22 | Stop reason: HOSPADM

## 2024-07-22 RX ORDER — SODIUM CHLORIDE 9 MG/ML
INJECTION, SOLUTION INTRAVENOUS PRN
Status: DISCONTINUED | OUTPATIENT
Start: 2024-07-22 | End: 2024-07-22 | Stop reason: HOSPADM

## 2024-07-22 RX ORDER — SODIUM CHLORIDE 0.9 % (FLUSH) 0.9 %
5-40 SYRINGE (ML) INJECTION PRN
Status: DISCONTINUED | OUTPATIENT
Start: 2024-07-22 | End: 2024-07-22 | Stop reason: HOSPADM

## 2024-07-22 RX ORDER — ONDANSETRON 2 MG/ML
4 INJECTION INTRAMUSCULAR; INTRAVENOUS ONCE
Status: DISCONTINUED | OUTPATIENT
Start: 2024-07-22 | End: 2024-07-22 | Stop reason: HOSPADM

## 2024-07-22 RX ORDER — MEPERIDINE HYDROCHLORIDE 50 MG/ML
12.5 INJECTION INTRAMUSCULAR; INTRAVENOUS; SUBCUTANEOUS EVERY 5 MIN PRN
Status: DISCONTINUED | OUTPATIENT
Start: 2024-07-22 | End: 2024-07-22 | Stop reason: HOSPADM

## 2024-07-22 RX ORDER — OXYCODONE HYDROCHLORIDE 5 MG/1
10 TABLET ORAL PRN
Status: DISCONTINUED | OUTPATIENT
Start: 2024-07-22 | End: 2024-07-22 | Stop reason: HOSPADM

## 2024-07-22 RX ORDER — PROPOFOL 10 MG/ML
INJECTION, EMULSION INTRAVENOUS PRN
Status: DISCONTINUED | OUTPATIENT
Start: 2024-07-22 | End: 2024-07-22 | Stop reason: SDUPTHER

## 2024-07-22 RX ORDER — NALOXONE HYDROCHLORIDE 0.4 MG/ML
INJECTION, SOLUTION INTRAMUSCULAR; INTRAVENOUS; SUBCUTANEOUS PRN
Status: DISCONTINUED | OUTPATIENT
Start: 2024-07-22 | End: 2024-07-22 | Stop reason: HOSPADM

## 2024-07-22 RX ORDER — FENTANYL CITRATE 50 UG/ML
INJECTION, SOLUTION INTRAMUSCULAR; INTRAVENOUS PRN
Status: DISCONTINUED | OUTPATIENT
Start: 2024-07-22 | End: 2024-07-22 | Stop reason: SDUPTHER

## 2024-07-22 RX ORDER — BUPIVACAINE HYDROCHLORIDE 5 MG/ML
INJECTION, SOLUTION EPIDURAL; INTRACAUDAL PRN
Status: DISCONTINUED | OUTPATIENT
Start: 2024-07-22 | End: 2024-07-22 | Stop reason: HOSPADM

## 2024-07-22 RX ORDER — OXYCODONE HYDROCHLORIDE 5 MG/1
5 TABLET ORAL PRN
Status: DISCONTINUED | OUTPATIENT
Start: 2024-07-22 | End: 2024-07-22 | Stop reason: HOSPADM

## 2024-07-22 RX ORDER — MIDAZOLAM HYDROCHLORIDE 1 MG/ML
2 INJECTION INTRAMUSCULAR; INTRAVENOUS
Status: DISCONTINUED | OUTPATIENT
Start: 2024-07-22 | End: 2024-07-22 | Stop reason: HOSPADM

## 2024-07-22 RX ORDER — LIDOCAINE HYDROCHLORIDE 20 MG/ML
INJECTION, SOLUTION INFILTRATION; PERINEURAL PRN
Status: DISCONTINUED | OUTPATIENT
Start: 2024-07-22 | End: 2024-07-22 | Stop reason: SDUPTHER

## 2024-07-22 RX ADMIN — SODIUM CHLORIDE, PRESERVATIVE FREE 10 ML: 5 INJECTION INTRAVENOUS at 08:39

## 2024-07-22 RX ADMIN — SPIRONOLACTONE 25 MG: 25 TABLET ORAL at 08:39

## 2024-07-22 RX ADMIN — ATORVASTATIN CALCIUM 40 MG: 40 TABLET, FILM COATED ORAL at 20:42

## 2024-07-22 RX ADMIN — SACUBITRIL AND VALSARTAN 1 TABLET: 24; 26 TABLET, FILM COATED ORAL at 08:39

## 2024-07-22 RX ADMIN — METOPROLOL SUCCINATE 25 MG: 25 TABLET, EXTENDED RELEASE ORAL at 08:39

## 2024-07-22 RX ADMIN — SODIUM CHLORIDE: 9 INJECTION, SOLUTION INTRAVENOUS at 17:55

## 2024-07-22 RX ADMIN — AMIODARONE HYDROCHLORIDE 200 MG: 200 TABLET ORAL at 08:39

## 2024-07-22 RX ADMIN — VANCOMYCIN HYDROCHLORIDE 1500 MG: 10 INJECTION, POWDER, LYOPHILIZED, FOR SOLUTION INTRAVENOUS at 05:27

## 2024-07-22 RX ADMIN — PANTOPRAZOLE SODIUM 40 MG: 40 TABLET, DELAYED RELEASE ORAL at 08:39

## 2024-07-22 RX ADMIN — SACUBITRIL AND VALSARTAN 1 TABLET: 24; 26 TABLET, FILM COATED ORAL at 20:43

## 2024-07-22 RX ADMIN — VANCOMYCIN HYDROCHLORIDE 1500 MG: 10 INJECTION, POWDER, LYOPHILIZED, FOR SOLUTION INTRAVENOUS at 17:55

## 2024-07-22 RX ADMIN — LIDOCAINE HYDROCHLORIDE 100 MG: 20 INJECTION, SOLUTION INFILTRATION; PERINEURAL at 16:20

## 2024-07-22 RX ADMIN — FENTANYL CITRATE 100 MCG: 50 INJECTION, SOLUTION INTRAMUSCULAR; INTRAVENOUS at 16:20

## 2024-07-22 RX ADMIN — SODIUM CHLORIDE, SODIUM LACTATE, POTASSIUM CHLORIDE, AND CALCIUM CHLORIDE: .6; .31; .03; .02 INJECTION, SOLUTION INTRAVENOUS at 16:16

## 2024-07-22 RX ADMIN — PROPOFOL 300 MG: 10 INJECTION, EMULSION INTRAVENOUS at 16:20

## 2024-07-22 RX ADMIN — METOPROLOL SUCCINATE 25 MG: 25 TABLET, EXTENDED RELEASE ORAL at 20:42

## 2024-07-22 RX ADMIN — SODIUM CHLORIDE, PRESERVATIVE FREE 10 ML: 5 INJECTION INTRAVENOUS at 20:43

## 2024-07-22 ASSESSMENT — PAIN SCALES - GENERAL
PAINLEVEL_OUTOF10: 0
PAINLEVEL_OUTOF10: 3
PAINLEVEL_OUTOF10: 0
PAINLEVEL_OUTOF10: 0

## 2024-07-22 ASSESSMENT — PAIN DESCRIPTION - LOCATION: LOCATION: FOOT

## 2024-07-22 ASSESSMENT — PAIN DESCRIPTION - DESCRIPTORS: DESCRIPTORS: SHOOTING

## 2024-07-22 ASSESSMENT — PAIN DESCRIPTION - ORIENTATION: ORIENTATION: RIGHT

## 2024-07-22 NOTE — PROGRESS NOTES
Patient received MAC. Report called to patient's nurse on C3. Patient transported out of PACU in stable condition.

## 2024-07-22 NOTE — PLAN OF CARE
Problem: Pain  Goal: Verbalizes/displays adequate comfort level or baseline comfort level  7/21/2024 2049 by Naomy Frederick RN  Outcome: Progressing  Verbalizes/displays adequate comfort level or baseline comfort level:   Encourage patient to monitor pain and request assistance   Assess pain using appropriate pain scale     Problem: Safety - Adult  Goal: Free from fall injury  7/21/2024 2049 by Naomy Frederick, RN  Outcome: Progressing  Free From Fall Injury: Instruct family/caregiver on patient safety

## 2024-07-22 NOTE — ANESTHESIA PRE PROCEDURE
artery disease involving native coronary artery of native heart with angina pectoris (HCC) 3/8/2024   • Hyperlipemia    • Hypertension    • Mixed hyperlipidemia 01/15/2020   • Observed sleep apnea 01/17/2024   • PAF (paroxysmal atrial fibrillation) (HCC) 09/14/2023   • Sleep apnea        Past Surgical History:        Procedure Laterality Date   • BREAST BIOPSY     • CARDIOVERSION  3/7/2024   • COLONOSCOPY      repeat in 2022   • COLONOSCOPY N/A 8/22/2023    COLONOSCOPY performed by Krystle Perez MD at [facility] ENDOSCOPY   • US THYROID BIOPSY  8/16/2021    US THYROID BIOPSY 8/16/2021 [facility] ULTRASOUND   • VASECTOMY         Social History:    Social History     Tobacco Use   • Smoking status: Never     Passive exposure: Past   • Smokeless tobacco: Never   Substance Use Topics   • Alcohol use: Yes     Alcohol/week: 8.0 standard drinks of alcohol     Types: 8 Cans of beer per week     Comment: weekends                                Counseling given: Not Answered      Vital Signs (Current):   Vitals:    07/22/24 0725 07/22/24 0839 07/22/24 1203 07/22/24 1513   BP: 108/71 108/71 125/80 127/76   Pulse: 60 60 78 67   Resp: 16  17 16   Temp: 98 °F (36.7 °C)  98.1 °F (36.7 °C) 98 °F (36.7 °C)   TempSrc: Oral  Oral Oral   SpO2: 96%  98% 100%   Weight:       Height:                                                  BP Readings from Last 3 Encounters:   07/22/24 127/76   07/20/24 132/81   06/25/24 96/60       NPO Status: Time of last liquid consumption: 0730                        Time of last solid consumption: 0730                        Date of last liquid consumption: 07/22/24                        Date of last solid food consumption: 07/22/24    BMI:   Wt Readings from Last 3 Encounters:   07/22/24 98.1 kg (216 lb 3.2 oz)   07/20/24 98.4 kg (216 lb 14.9 oz)   07/20/24 98.4 kg (217 lb)     Body mass index is 27.76 kg/m².    CBC:   Lab Results   Component Value Date/Time    WBC 8.1 07/21/2024 04:59 AM    RBC 4.40 07/21/2024

## 2024-07-22 NOTE — CARE COORDINATION
Case Management Assessment  Initial Evaluation    Date/Time of Evaluation: 7/22/2024 9:06 AM  Assessment Completed by: Mary De Dios RN    If patient is discharged prior to next notation, then this note serves as note for discharge by case management.    Patient Name: Weston Carmona                   YOB: 1965  Diagnosis: Hyperglycemia [R73.9]  Cellulitis of second toe of right foot [L03.031]  Peripheral polyneuropathy [G62.9]  Right foot infection [L08.9]                   Date / Time: 7/20/2024 11:38 AM    Patient Admission Status: Inpatient   Readmission Risk (Low < 19, Mod (19-27), High > 27): Readmission Risk Score: 11.8    Current PCP: Mia Alberto PA  PCP verified by CM? Yes    Chart Reviewed: Yes      History Provided by: Patient  Patient Orientation: Alert and Oriented    Patient Cognition: Alert    Hospitalization in the last 30 days (Readmission):  No    If yes, Readmission Assessment in CM Navigator will be completed.    Advance Directives:      Code Status: Full Code   Patient's Primary Decision Maker is: Patient Declined (Legal Next of Kin Remains as Decision Maker)    Primary Decision Maker: Brian Carmona - Child - [phone]    Secondary Decision Maker: nico carmona - Child - [phone]    Discharge Planning:    Patient lives with: Spouse/Significant Other Type of Home: House  Primary Care Giver: Self  Patient Support Systems include: Spouse/Significant Other, Children   Current Financial resources: Medicaid  Current community resources: None  Current services prior to admission: C-pap            Current DME:              Type of Home Care services:  None    ADLS  Prior functional level: Independent in ADLs/IADLs  Current functional level: Independent in ADLs/IADLs    PT AM-PAC:   /24  OT AM-PAC:   /24    Family can provide assistance at DC: Yes  Would you like Case Management to discuss the discharge plan with any other family members/significant others, and if so,

## 2024-07-22 NOTE — ANESTHESIA POSTPROCEDURE EVALUATION
Department of Anesthesiology  Postprocedure Note    Patient: Weston Edmond  MRN: [id number]  YOB: 1965  Date of evaluation: 7/22/2024    Procedure Summary       Date: 07/22/24 Room / Location: [address]    Anesthesia Start: 1616 Anesthesia Stop: 1715    Procedure: RIGHT PARTIAL SECOND TOE AMPUTATION (Right) Diagnosis:       Other osteomyelitis of right foot (HCC)      (Other osteomyelitis of right foot (HCC) [M86.8X7])    Surgeons: Alena Soni MD Responsible Provider: Stevie Whitt MD    Anesthesia Type: general ASA Status: 3            Anesthesia Type: No value filed.    Cyrus Phase I: Cyrus Score: 10    Cyrus Phase II: Cyrus Score: 10    Anesthesia Post Evaluation    Comments: Anes Post-op Note    Name:    Weston Edmond  MRN:      [id number]    Patient Vitals in the past 12 hrs:  07/22/24 1741, BP:130/80, Temp:97.8 °F (36.6 °C), Temp src:Oral, Pulse:60, Resp:16, SpO2:99 %  07/22/24 1723, Temp:97.8 °F (36.6 °C), Temp src:Oral  07/22/24 1711, BP:115/70, Temp:(!) 96.5 °F (35.8 °C), Temp src:Oral, Pulse:63, Resp:16, SpO2:96 %  07/22/24 1513, BP:127/76, Temp:98 °F (36.7 °C), Temp src:Oral, Pulse:67, Resp:16, SpO2:100 %  07/22/24 1203, BP:125/80, Temp:98.1 °F (36.7 °C), Temp src:Oral, Pulse:78, Resp:17, SpO2:98 %  07/22/24 0839, BP:108/71, Pulse:60  07/22/24 0725, BP:108/71, Temp:98 °F (36.7 °C), Temp src:Oral, Pulse:60, Resp:16, SpO2:96 %     LABS:    CBC  Lab Results       Component                Value               Date/Time                  WBC                      8.1                 07/21/2024 04:59 AM        HGB                      14.1                07/21/2024 04:59 AM        HCT                      41.9                07/21/2024 04:59 AM        PLT                      197                 07/21/2024 04:59 AM   RENAL  Lab Results       Component                Value               Date/Time                  NA                       135 (L)

## 2024-07-22 NOTE — PLAN OF CARE
Problem: Pain  Goal: Verbalizes/displays adequate comfort level or baseline comfort level  7/22/2024 1023 by Radha Flowers, RN  Outcome: Progressing  Flowsheets (Taken 7/22/2024 1023)  Verbalizes/displays adequate comfort level or baseline comfort level:   Encourage patient to monitor pain and request assistance   Assess pain using appropriate pain scale   Administer analgesics based on type and severity of pain and evaluate response     Problem: Safety - Adult  Goal: Free from fall injury  7/22/2024 1023 by Radha Flowers, RN  Outcome: Progressing  Flowsheets (Taken 7/22/2024 1023)  Free From Fall Injury: Instruct family/caregiver on patient safety

## 2024-07-22 NOTE — PROGRESS NOTES
Shift assessment completed. Pt A&O x4.  VSS.  Denies Pain at this time. IV site on Right AC was leaking. Removed. Placed new IV 20G on left FA. IV patent/ flushed, and saline locked. Patient on RA.  Patient admits to passing gas. Patient ambulates by self to bathroom.  Medication given per MAR. Denies any needs at this time. Family at bedside. Bed locked and in lowest position.  Call light within reach. Patient remains in bed for comfort. Patient in stable condition when RN leaving room. Patient is currently NPO for surgery later today.   Electronically signed by Radha Flowers RN on 7/22/2024 at 10:25 AM

## 2024-07-22 NOTE — PROGRESS NOTES
Patient arrived to PACU bay 5, phase two initiated. VSS. Report obtained from OR RN and anesthesia. Patient on room air. Assessment WNL. Warm blankets applied. Side rails in place, will monitor patient.

## 2024-07-22 NOTE — PROGRESS NOTES
Hospital Medicine Progress Note      Date of Admission: 7/20/2024  Hospital Day: 3    Chief Admission Complaint:  rt foot pain/infection     Subjective:  resting in bed, pain controlled, notes ongoing numbness, aware of surgery today, brother at bedside    Presenting Admission History:         Weston Edmond is a 58 y.o. male with pmh of hyperlipidemia, paroxysmal atrial fibrillation, HFrEF, GERD who presents with above history.  Patient was apparently asymptomatic until couple of months ago he had calluses on the toes with ingrown nails for which he went to podiatry which picked up but he started having swelling of the second toe since 1 to 2 months but got worse since 2 days associated with some shooting pain occasionally, at the tip there is an ulcer with some eschar and dark discoloration.  Swelling and redness extended to foot and lower leg since 2 days.  Denies any fever, nausea, vomiting, urinary or bowel symptoms.     Assessment/Plan:      Current Principal Problem:  Right foot infection      # Right second toe infection with ? Gangrenous changes and cellulitis of foot and distal leg, rule out osteomyelitis: Swelling and ulceration to the second toe, foul-smelling, x-ray no fracture, ESR and CRP elevated,   -obtained MRI right foot and confirmed OM  -obtain blood and wound cultures,   -started on cefepime and vancomycin   -consulted podiatry,  -ID consulted     # Hyperlipidemia  # Paroxysmal atrial fibrillation  # HFrEF  # GERD  -Continue home medication if no contraindication    Physical Exam Performed:      General appearance:  No apparent distress  Respiratory:  Normal respiratory effort.   Cardiovascular:  Regular rate and rhythm.  Abdomen:  Soft, non-tender, non-distended.  Musculoskeletal:  No edema, rt foot in dressing noted  Neurologic:  Non-focal  Psychiatric:  Alert and oriented    /76   Pulse 67   Temp 98 °F (36.7 °C) (Oral)   Resp 16   Ht 1.88 m (6' 2\")   Wt 98.1 kg (216 lb 3.2 oz)     [] Major surgery/procedure with associated risk factors:    ----------------------------------------------------------------------  C. Data (any 2)  [x] Discussed current management and discharge planning options with Case Management.  [] Discussed management of the case with:    [] Telemetry personally reviewed and interpreted as documented above    [] Imaging personally reviewed and interpreted, includes:    [x] Data Review (any 3)  [x] All available Consultant notes from yesterday/today were reviewed  [x] All current labs were reviewed and interpreted for clinical significance   [x] Appropriate follow-up labs were ordered  [] Collateral history obtained from:        Medications:  Personally reviewed in detail in conjunction w/ labs as documented for evidence of drug toxicity.     Infusion Medications    sodium chloride 15 mL/hr at 07/21/24 1818     Scheduled Medications    amiodarone  200 mg Oral Daily    [Held by provider] apixaban  5 mg Oral BID    atorvastatin  40 mg Oral Daily    pantoprazole  40 mg Oral QAM AC    sacubitril-valsartan  1 tablet Oral BID    spironolactone  25 mg Oral Daily    sodium chloride flush  5-40 mL IntraVENous 2 times per day    vancomycin  1,500 mg IntraVENous Q12H    metoprolol succinate  25 mg Oral BID     PRN Meds: BUPivacaine (PF), furosemide, sodium chloride flush, sodium chloride, potassium chloride **OR** potassium alternative oral replacement **OR** potassium chloride, magnesium sulfate, ondansetron **OR** ondansetron, polyethylene glycol, acetaminophen **OR** acetaminophen     Labs:  Personally reviewed and interpreted for clinical significance.     Recent Labs     07/20/24  1250 07/21/24  0459   WBC 7.7 8.1   HGB 13.5 14.1   HCT 40.3* 41.9    197     Recent Labs     07/20/24  1238 07/21/24  0459    135*   K 4.5 4.7    105   CO2 24 18*   BUN 17 14   CREATININE 0.8* 0.7*   CALCIUM 9.1 8.6     No results for input(s): \"PROBNP\", \"TROPHS\" in the last 72

## 2024-07-22 NOTE — CONSULTS
Consult Placed   Consult sent via perfect serve   Who: Joan  Date: 7/22/2024   Time: 5:43     Electronically signed by Catherine Londono on 7/22/2024 at 5:43 PM

## 2024-07-22 NOTE — PROGRESS NOTES
Patient admitted to [facility] 11 in preparation for surgery, VSS. Consent confirmed. IV verified and flushed, LR infusing. Belongings Glasses and underwear. NPO since 0730. Family at bedside, phone number in system for text updates, call light within reach.

## 2024-07-22 NOTE — PROGRESS NOTES
Patient returned to floor. A/O x4. Denies all pain. Antibiotic hung. Multiple family members at bedside. Patient denies any needs at this time. Bed locked in lowest position. Call light within reach. Electronically signed by Radha Flowers RN on 7/22/2024 at 6:54 PM

## 2024-07-22 NOTE — ADT AUTH CERT
Diagnoses Diagnosis  Free Text   R73.9ICD-10-CMHyperglycemia    L03.031ICD-10-CMCellulitis of second toe of right foot    G62.9ICD-10-CMPeripheral polyneuropathy    L08.9ICD-10-CMRight foot infection

## 2024-07-22 NOTE — OP NOTE
Operative Note      Patient: Weston Edmond  YOB: 1965  MRN: [id number]    Date of Procedure: 7/22/2024    Pre-Op Diagnosis: osteomyelitis, right foot; ulcer, right foot with necrosis of bone; Charcot Liliana Tooth; hammertoe, right    Post-Op Diagnosis: Same       Procedure: Right partial 2nd toe amputation (CPT 25908)     Surgeon: Alena Soni DPM  Assistant: Surgical Assistant: Danyelle Penaloza    Anesthesia: MAC    Injections: 10 mL 0.5% marcaine plain  Materials: 3-0 vicryl, 3-0 nylon  Tourniquet: None    Estimated Blood Loss (mL): Minimal  Complications: None    Specimens: R 2nd toe deep tissue for C&S; R partial 2nd toe to pathology   ID Type Source Tests Collected by Time Destination   1 : deep tissue right second toe Specimen Toe CULTURE, FUNGUS, CULTURE, SURGICAL, CULTURE WITH SMEAR, ACID FAST BACILLIUS Alena Soni MD 7/22/2024 1637    A : right second toe Specimen Toe SURGICAL PATHOLOGY Alena Soni MD 7/22/2024 1638        Findings: necrosis of the bone at the distal phalanx of the right 2nd toe; nonviable subcutaneous tissue, fat, and deep fascia about the entire middle phalanx of the 2nd toe; okay vascular perfusion; no formal deep space abscess     Indications for Procedure: Weston Edmond, is a 58 year old male who has an ulceration to the distal aspect of his right 2nd toe that probes to bone with purulent drainage recalcitrant to conservative therapy. Pt with osteomyelitis confirmed via MRI to the distal phalanx of the right 2nd toe. It was determined patient would benefit from above surgical intervention. All risks (including but not limited to infection, blood loss, blood clots such as DVT and PE, injuries to nerves, vessels, tendons, ligaments, bone and other structures, increased pain, stiffness, significant scarring, and recurrence with the need for additional surgery), complications, benefits, and alternatives were explained to the patient in detail. Patient acknowledges

## 2024-07-22 NOTE — BRIEF OP NOTE
Brief Postoperative Note      Patient: Weston Edmond  YOB: 1965  MRN: [id number]    Date of Procedure: 7/22/2024    Pre-Op Diagnosis Codes:     * Other osteomyelitis of right foot (HCC) [M86.8X7]    Post-Op Diagnosis: Same       Procedure(s):  RIGHT PARTIAL SECOND TOE AMPUTATION    Surgeon(s):  Alena Soni MD    Assistant:  Surgical Assistant: Danyelle Penaloza    Anesthesia: General    Estimated Blood Loss (mL): Minimal    Complications: None    Specimens: R 2nd toe deep tissue for C&S; R partial 2nd toe to pathology   ID Type Source Tests Collected by Time Destination   1 : deep tissue right second toe Specimen Toe CULTURE, FUNGUS, CULTURE, SURGICAL, CULTURE WITH SMEAR, ACID FAST BACILLIUS Alena Soni MD 7/22/2024 1637    A : right second toe Specimen Toe SURGICAL PATHOLOGY Alena Soni MD 7/22/2024 1638      Implants: None      Drains: None    Findings: necrosis of the bone at the distal phalanx of the right 2nd toe; nonviable subcutaneous tissue, fat, and deep fascia about the entire middle phalanx of the 2nd toe; okay vascular perfusion; no formal deep space abscess     Electronically signed by Alena Soni MD on 7/22/2024 at 5:13 PM

## 2024-07-23 ENCOUNTER — HOSPITAL ENCOUNTER (INPATIENT)
Dept: VASCULAR LAB | Age: 59
Discharge: HOME | End: 2024-07-25
Attending: STUDENT IN AN ORGANIZED HEALTH CARE EDUCATION/TRAINING PROGRAM
Payer: COMMERCIAL

## 2024-07-23 LAB
ACID FAST STN SPEC QL: NORMAL
ALBUMIN SERPL-MCNC: 3.9 G/DL (ref 3.4–5)
ANION GAP SERPL CALCULATED.3IONS-SCNC: 11 MMOL/L (ref 3–16)
BASOPHILS # BLD: 0 K/UL (ref 0–0.2)
BASOPHILS NFR BLD: 0.6 %
BUN SERPL-MCNC: 16 MG/DL (ref 7–20)
CALCIUM SERPL-MCNC: 9.1 MG/DL (ref 8.3–10.6)
CHLORIDE SERPL-SCNC: 102 MMOL/L (ref 99–110)
CO2 SERPL-SCNC: 26 MMOL/L (ref 21–32)
CREAT SERPL-MCNC: 0.8 MG/DL (ref 0.9–1.3)
DEPRECATED RDW RBC AUTO: 13.7 % (ref 12.4–15.4)
ECHO BSA: 2.27 M2
EOSINOPHIL # BLD: 0.1 K/UL (ref 0–0.6)
EOSINOPHIL NFR BLD: 0.7 %
GFR SERPLBLD CREATININE-BSD FMLA CKD-EPI: >90 ML/MIN/{1.73_M2}
GLUCOSE SERPL-MCNC: 98 MG/DL (ref 70–99)
HCT VFR BLD AUTO: 43.3 % (ref 40.5–52.5)
HGB BLD-MCNC: 14.7 G/DL (ref 13.5–17.5)
LOEFFLER MB STN SPEC: NORMAL
LYMPHOCYTES # BLD: 2 K/UL (ref 1–5.1)
LYMPHOCYTES NFR BLD: 23.6 %
MAGNESIUM SERPL-MCNC: 1.9 MG/DL (ref 1.8–2.4)
MCH RBC QN AUTO: 31.8 PG (ref 26–34)
MCHC RBC AUTO-ENTMCNC: 33.9 G/DL (ref 31–36)
MCV RBC AUTO: 93.7 FL (ref 80–100)
MONOCYTES # BLD: 0.6 K/UL (ref 0–1.3)
MONOCYTES NFR BLD: 6.7 %
NEUTROPHILS # BLD: 5.8 K/UL (ref 1.7–7.7)
NEUTROPHILS NFR BLD: 68.4 %
PHOSPHATE SERPL-MCNC: 3.4 MG/DL (ref 2.5–4.9)
PLATELET # BLD AUTO: 250 K/UL (ref 135–450)
PMV BLD AUTO: 7.3 FL (ref 5–10.5)
POTASSIUM SERPL-SCNC: 4.5 MMOL/L (ref 3.5–5.1)
RBC # BLD AUTO: 4.62 M/UL (ref 4.2–5.9)
SODIUM SERPL-SCNC: 139 MMOL/L (ref 136–145)
VAS LEFT ABI: 1.3
VAS LEFT ATA DIST PSV: 55 CM/S
VAS LEFT CFA DIST PSV: 101 CM/S
VAS LEFT CFA PROX PSV: 94.1 CM/S
VAS LEFT PERONEAL MID PSV: 48.2 CM/S
VAS LEFT PFA PROX PSV: 62.1 CM/S
VAS LEFT POP A DIST PSV: 71.9 CM/S
VAS LEFT POP A PROX PSV: 65.3 CM/S
VAS LEFT POP A PROX VEL RATIO: 1.03
VAS LEFT PTA BP: 150 MMHG
VAS LEFT PTA DIST PSV: 82.7 CM/S
VAS LEFT PTA MID PSV: 72.2 CM/S
VAS LEFT SFA DIST PSV: 63.1 CM/S
VAS LEFT SFA DIST VEL RATIO: 0.68
VAS LEFT SFA MID PSV: 92.7 CM/S
VAS LEFT SFA MID VEL RATIO: 1.04
VAS LEFT SFA PROX PSV: 89.5 CM/S
VAS LEFT SFA PROX VEL RATIO: 0.89
VAS RIGHT ABI: 1.48
VAS RIGHT ARM BP: 115 MMHG
VAS RIGHT ATA DIST PSV: 60.9 CM/S
VAS RIGHT CFA DIST PSV: 109 CM/S
VAS RIGHT CFA PROX PSV: 136 CM/S
VAS RIGHT PERONEAL MID PSV: 63.1 CM/S
VAS RIGHT PFA PROX PSV: 84.7 CM/S
VAS RIGHT POP A DIST PSV: 100 CM/S
VAS RIGHT POP A PROX PSV: 90.9 CM/S
VAS RIGHT POP A PROX VEL RATIO: 0.94
VAS RIGHT PTA BP: 170 MMHG
VAS RIGHT PTA DIST PSV: 92.7 CM/S
VAS RIGHT PTA MID PSV: 88.9 CM/S
VAS RIGHT SFA DIST PSV: 96.4 CM/S
VAS RIGHT SFA DIST VEL RATIO: 0.81
VAS RIGHT SFA MID PSV: 119 CM/S
VAS RIGHT SFA MID VEL RATIO: 1
VAS RIGHT SFA PROX PSV: 115 CM/S
VAS RIGHT SFA PROX VEL RATIO: 0.8
WBC # BLD AUTO: 8.5 K/UL (ref 4–11)

## 2024-07-23 PROCEDURE — 6370000000 HC RX 637 (ALT 250 FOR IP): Performed by: INTERNAL MEDICINE

## 2024-07-23 PROCEDURE — 2580000003 HC RX 258: Performed by: STUDENT IN AN ORGANIZED HEALTH CARE EDUCATION/TRAINING PROGRAM

## 2024-07-23 PROCEDURE — 83735 ASSAY OF MAGNESIUM: CPT

## 2024-07-23 PROCEDURE — 6370000000 HC RX 637 (ALT 250 FOR IP): Performed by: STUDENT IN AN ORGANIZED HEALTH CARE EDUCATION/TRAINING PROGRAM

## 2024-07-23 PROCEDURE — 99222 1ST HOSP IP/OBS MODERATE 55: CPT | Performed by: INTERNAL MEDICINE

## 2024-07-23 PROCEDURE — 6360000002 HC RX W HCPCS: Performed by: STUDENT IN AN ORGANIZED HEALTH CARE EDUCATION/TRAINING PROGRAM

## 2024-07-23 PROCEDURE — 93925 LOWER EXTREMITY STUDY: CPT

## 2024-07-23 PROCEDURE — 1200000000 HC SEMI PRIVATE

## 2024-07-23 PROCEDURE — 36415 COLL VENOUS BLD VENIPUNCTURE: CPT

## 2024-07-23 PROCEDURE — 6360000002 HC RX W HCPCS: Performed by: INTERNAL MEDICINE

## 2024-07-23 PROCEDURE — 80069 RENAL FUNCTION PANEL: CPT

## 2024-07-23 PROCEDURE — 93925 LOWER EXTREMITY STUDY: CPT | Performed by: SURGERY

## 2024-07-23 PROCEDURE — 85025 COMPLETE CBC W/AUTO DIFF WBC: CPT

## 2024-07-23 RX ORDER — OXYCODONE HYDROCHLORIDE AND ACETAMINOPHEN 5; 325 MG/1; MG/1
1 TABLET ORAL EVERY 8 HOURS PRN
Status: DISCONTINUED | OUTPATIENT
Start: 2024-07-23 | End: 2024-07-24 | Stop reason: HOSPADM

## 2024-07-23 RX ORDER — MORPHINE SULFATE 2 MG/ML
2 INJECTION, SOLUTION INTRAMUSCULAR; INTRAVENOUS EVERY 4 HOURS PRN
Status: DISCONTINUED | OUTPATIENT
Start: 2024-07-23 | End: 2024-07-24 | Stop reason: HOSPADM

## 2024-07-23 RX ORDER — KETOROLAC TROMETHAMINE 30 MG/ML
15 INJECTION, SOLUTION INTRAMUSCULAR; INTRAVENOUS EVERY 6 HOURS PRN
Status: DISCONTINUED | OUTPATIENT
Start: 2024-07-23 | End: 2024-07-24 | Stop reason: HOSPADM

## 2024-07-23 RX ADMIN — SACUBITRIL AND VALSARTAN 1 TABLET: 24; 26 TABLET, FILM COATED ORAL at 09:16

## 2024-07-23 RX ADMIN — PANTOPRAZOLE SODIUM 40 MG: 40 TABLET, DELAYED RELEASE ORAL at 09:16

## 2024-07-23 RX ADMIN — SODIUM CHLORIDE, PRESERVATIVE FREE 10 ML: 5 INJECTION INTRAVENOUS at 09:20

## 2024-07-23 RX ADMIN — OXYCODONE HYDROCHLORIDE AND ACETAMINOPHEN 1 TABLET: 5; 325 TABLET ORAL at 16:09

## 2024-07-23 RX ADMIN — KETOROLAC TROMETHAMINE 15 MG: 30 INJECTION, SOLUTION INTRAMUSCULAR at 16:08

## 2024-07-23 RX ADMIN — VANCOMYCIN HYDROCHLORIDE 1500 MG: 10 INJECTION, POWDER, LYOPHILIZED, FOR SOLUTION INTRAVENOUS at 18:45

## 2024-07-23 RX ADMIN — SPIRONOLACTONE 25 MG: 25 TABLET ORAL at 09:16

## 2024-07-23 RX ADMIN — METOPROLOL SUCCINATE 25 MG: 25 TABLET, EXTENDED RELEASE ORAL at 09:16

## 2024-07-23 RX ADMIN — SODIUM CHLORIDE, PRESERVATIVE FREE 10 ML: 5 INJECTION INTRAVENOUS at 20:06

## 2024-07-23 RX ADMIN — VANCOMYCIN HYDROCHLORIDE 1500 MG: 10 INJECTION, POWDER, LYOPHILIZED, FOR SOLUTION INTRAVENOUS at 06:06

## 2024-07-23 RX ADMIN — METOPROLOL SUCCINATE 25 MG: 25 TABLET, EXTENDED RELEASE ORAL at 20:05

## 2024-07-23 RX ADMIN — AMIODARONE HYDROCHLORIDE 200 MG: 200 TABLET ORAL at 09:16

## 2024-07-23 RX ADMIN — ATORVASTATIN CALCIUM 40 MG: 40 TABLET, FILM COATED ORAL at 20:05

## 2024-07-23 RX ADMIN — MORPHINE SULFATE 2 MG: 2 INJECTION, SOLUTION INTRAMUSCULAR; INTRAVENOUS at 12:26

## 2024-07-23 RX ADMIN — SACUBITRIL AND VALSARTAN 1 TABLET: 24; 26 TABLET, FILM COATED ORAL at 20:05

## 2024-07-23 ASSESSMENT — PAIN SCALES - GENERAL
PAINLEVEL_OUTOF10: 5

## 2024-07-23 ASSESSMENT — PAIN DESCRIPTION - ORIENTATION: ORIENTATION: RIGHT

## 2024-07-23 ASSESSMENT — PAIN DESCRIPTION - DESCRIPTORS: DESCRIPTORS: SHARP;SHOOTING;DULL

## 2024-07-23 ASSESSMENT — PAIN DESCRIPTION - LOCATION: LOCATION: FOOT

## 2024-07-23 NOTE — CONSULTS
Infectious Diseases   Consult Note      Reason for Consult:  OM toe    Requesting Physician:  Raymond      Date of Admission: 7/20/2024    Subjective:   CHIEF COMPLAINT:  none given       HPI:    Weston Edmond is a 58yoM with history of HLD,pAfib, CHF, GERD    Developed a wound on the tip of the R 2nd toe several weeks ago.  The wound failed to heal.  The toe became acutely more swollen and red over the weekend and he came to the ED 7/20/24    He was not systemically ill  XR suggested OM in the distal phalanx    OR 7/22/24 - partial toe amp thought to be definitive  Surgical culture is in process  BC collected on admission are negative  ID is consulted for abx management     No abx exposure for this problem prior to this admission                    Current abx:  Vanc 1500 q12       Past Surgical History:       Diagnosis Date    Arthritis     Charcot-Liliana-Tooth disease     CHF (congestive heart failure) (HCC)     Class 1 obesity due to excess calories with body mass index (BMI) of 33.0 to 33.9 in adult 01/17/2024    Coronary artery disease involving native coronary artery of native heart with angina pectoris ([facility]) 3/8/2024    Hyperlipemia     Hypertension     Mixed hyperlipidemia 01/15/2020    Observed sleep apnea 01/17/2024    PAF (paroxysmal atrial fibrillation) ([facility]) 09/14/2023    Sleep apnea          Procedure Laterality Date    BREAST BIOPSY      CARDIOVERSION  3/7/2024    COLONOSCOPY      repeat in 2022    COLONOSCOPY N/A 8/22/2023    COLONOSCOPY performed by Krystle Perez MD at [facility] ENDOSCOPY    TOE AMPUTATION Right 7/22/2024    RIGHT PARTIAL SECOND TOE AMPUTATION performed by Alena Soni MD at [facility] OR    US THYROID BIOPSY  8/16/2021    US THYROID BIOPSY 8/16/2021 [facility] ULTRASOUND    VASECTOMY           Social History:    TOBACCO:   reports that he has never smoked. He has been exposed to tobacco smoke. He has never used smokeless tobacco.  ETOH:   reports current alcohol use of about 8.0 standard

## 2024-07-23 NOTE — PLAN OF CARE
Problem: Pain  Goal: Verbalizes/displays adequate comfort level or baseline comfort level  Outcome: Progressing     Problem: Safety - Adult  Goal: Free from fall injury  Outcome: Progressing     Problem: Skin/Tissue Integrity - Adult  Goal: Incisions, wounds, or drain sites healing without S/S of infection  Reactivated

## 2024-07-23 NOTE — PROGRESS NOTES
Physical Therapy    Orders received. Per chart review, pt ambulating independently in room. Pt cleared for session by RN. Upon discussion with pt, pt feels he does not need PT at this time and demos independence briefly without AD in post op shoe with mobility. No charges. PT signing off of orders at this time. Please re-order should further needs arise.    Padmini Peña, PT

## 2024-07-23 NOTE — PROGRESS NOTES
Pt assessment completed and charted. VSS. Pt a/o. Pt c/o 5/10 surgical pain. Perfect serve sent to Dr. Andrade requesting PRN pain medication. Pt ambulating independently w/ surgical shoe in place. Bed in lowest position and wheels locked. Call light within reach. Bedside table within reach. Non-skid socks in place. Pt denies any other needs at this time.  Pt calls out appropriately.

## 2024-07-23 NOTE — PLAN OF CARE
Problem: Pain  Goal: Verbalizes/displays adequate comfort level or baseline comfort level  7/23/2024 0925 by Za West RN  Outcome: Progressing  Flowsheets (Taken 7/23/2024 0925)  Verbalizes/displays adequate comfort level or baseline comfort level:   Encourage patient to monitor pain and request assistance   Assess pain using appropriate pain scale   Administer analgesics based on type and severity of pain and evaluate response   Implement non-pharmacological measures as appropriate and evaluate response  7/23/2024 0350 by Jeanne Hollingsworth, RN  Outcome: Progressing     Problem: Safety - Adult  Goal: Free from fall injury  7/23/2024 0925 by Za West, RN  Outcome: Progressing  Flowsheets (Taken 7/23/2024 0925)  Free From Fall Injury: Instruct family/caregiver on patient safety  7/23/2024 0350 by Jeanne Hollingsworth, RN  Outcome: Progressing

## 2024-07-23 NOTE — CARE COORDINATION
Patient underwent a right partial 2nd toe amputation 7/22 for osteomyelitis  and necrosis of bone with podiatry. Patient is from home with spouse. IPTA. H eis being treated also for cellulitis in right foot. Patient plans to return home. Monitoring clinical course in acute settings for recommendations and needs for d/c.

## 2024-07-23 NOTE — PROGRESS NOTES
Hospital Medicine Progress Note      Date of Admission: 7/20/2024  Hospital Day: 4    Chief Admission Complaint:  rt foot pain/infection     Subjective:  resting in bed, pain not quite controlled, is able to walk to bathroom     Presenting Admission History:          Weston Edmond is a 58 y.o. male with pmh of hyperlipidemia, paroxysmal atrial fibrillation, HFrEF, GERD who presents with above history.  Patient was apparently asymptomatic until couple of months ago he had calluses on the toes with ingrown nails for which he went to podiatry which picked up but he started having swelling of the second toe since 1 to 2 months but got worse since 2 days associated with some shooting pain occasionally, at the tip there is an ulcer with some eschar and dark discoloration.  Swelling and redness extended to foot and lower leg since 2 days.  Denies any fever, nausea, vomiting, urinary or bowel symptoms.      Assessment/Plan:       Current Principal Problem:  Right foot infection       # Right second toe infection with ? Gangrenous changes and cellulitis of foot and distal leg, rule out osteomyelitis: Swelling and ulceration to the second toe, foul-smelling, x-ray no fracture, ESR and CRP elevated,   -obtained MRI right foot and confirmed OM  -obtain blood and wound cultures,   -started on cefepime and vancomycin   -consulted podiatry, s/p surgery 7/22  -ID consulted     # Hyperlipidemia- on statin    # Paroxysmal atrial fibrillation- stable  -on tele,   -held eliquis for surgery, resume per podiatry  -on bb    # HFrEF- compensated  -on bb, entresto, aldactone    # GERD  -Continued home medication      Physical Exam Performed:       General appearance:  No apparent distress  Respiratory:  Normal respiratory effort.   Cardiovascular:  Regular rate and rhythm.  Abdomen:  Soft, non-tender, non-distended.  Musculoskeletal:  No edema, postop rt foot dressing noted with surgical shoe  Neurologic:  Non-focal  Psychiatric:  Alert

## 2024-07-24 VITALS
HEIGHT: 74 IN | BODY MASS INDEX: 28.23 KG/M2 | WEIGHT: 220 LBS | HEART RATE: 65 BPM | SYSTOLIC BLOOD PRESSURE: 126 MMHG | TEMPERATURE: 97.7 F | DIASTOLIC BLOOD PRESSURE: 80 MMHG | RESPIRATION RATE: 16 BRPM | OXYGEN SATURATION: 98 %

## 2024-07-24 LAB
ALBUMIN SERPL-MCNC: 3.6 G/DL (ref 3.4–5)
ANION GAP SERPL CALCULATED.3IONS-SCNC: 10 MMOL/L (ref 3–16)
BACTERIA BLD CULT ORG #2: NORMAL
BACTERIA BLD CULT: NORMAL
BASOPHILS # BLD: 0 K/UL (ref 0–0.2)
BASOPHILS NFR BLD: 0.6 %
BUN SERPL-MCNC: 20 MG/DL (ref 7–20)
CALCIUM SERPL-MCNC: 9.1 MG/DL (ref 8.3–10.6)
CHLORIDE SERPL-SCNC: 106 MMOL/L (ref 99–110)
CO2 SERPL-SCNC: 24 MMOL/L (ref 21–32)
CREAT SERPL-MCNC: 0.7 MG/DL (ref 0.9–1.3)
DEPRECATED RDW RBC AUTO: 13.6 % (ref 12.4–15.4)
EOSINOPHIL # BLD: 0.1 K/UL (ref 0–0.6)
EOSINOPHIL NFR BLD: 1.4 %
GFR SERPLBLD CREATININE-BSD FMLA CKD-EPI: >90 ML/MIN/{1.73_M2}
GLUCOSE SERPL-MCNC: 107 MG/DL (ref 70–99)
HCT VFR BLD AUTO: 41.5 % (ref 40.5–52.5)
HGB BLD-MCNC: 13.7 G/DL (ref 13.5–17.5)
LYMPHOCYTES # BLD: 2.1 K/UL (ref 1–5.1)
LYMPHOCYTES NFR BLD: 27.8 %
MAGNESIUM SERPL-MCNC: 2.2 MG/DL (ref 1.8–2.4)
MCH RBC QN AUTO: 31.1 PG (ref 26–34)
MCHC RBC AUTO-ENTMCNC: 33 G/DL (ref 31–36)
MCV RBC AUTO: 94.1 FL (ref 80–100)
MONOCYTES # BLD: 0.8 K/UL (ref 0–1.3)
MONOCYTES NFR BLD: 10.4 %
NEUTROPHILS # BLD: 4.5 K/UL (ref 1.7–7.7)
NEUTROPHILS NFR BLD: 59.8 %
PHOSPHATE SERPL-MCNC: 3.8 MG/DL (ref 2.5–4.9)
PLATELET # BLD AUTO: 228 K/UL (ref 135–450)
PMV BLD AUTO: 7.7 FL (ref 5–10.5)
POTASSIUM SERPL-SCNC: 4.1 MMOL/L (ref 3.5–5.1)
RBC # BLD AUTO: 4.42 M/UL (ref 4.2–5.9)
SODIUM SERPL-SCNC: 140 MMOL/L (ref 136–145)
WBC # BLD AUTO: 7.5 K/UL (ref 4–11)

## 2024-07-24 PROCEDURE — 6370000000 HC RX 637 (ALT 250 FOR IP): Performed by: STUDENT IN AN ORGANIZED HEALTH CARE EDUCATION/TRAINING PROGRAM

## 2024-07-24 PROCEDURE — 2580000003 HC RX 258: Performed by: STUDENT IN AN ORGANIZED HEALTH CARE EDUCATION/TRAINING PROGRAM

## 2024-07-24 PROCEDURE — 83735 ASSAY OF MAGNESIUM: CPT

## 2024-07-24 PROCEDURE — 80069 RENAL FUNCTION PANEL: CPT

## 2024-07-24 PROCEDURE — 85025 COMPLETE CBC W/AUTO DIFF WBC: CPT

## 2024-07-24 PROCEDURE — 99231 SBSQ HOSP IP/OBS SF/LOW 25: CPT | Performed by: INTERNAL MEDICINE

## 2024-07-24 PROCEDURE — 36415 COLL VENOUS BLD VENIPUNCTURE: CPT

## 2024-07-24 PROCEDURE — 6360000002 HC RX W HCPCS: Performed by: STUDENT IN AN ORGANIZED HEALTH CARE EDUCATION/TRAINING PROGRAM

## 2024-07-24 PROCEDURE — 6370000000 HC RX 637 (ALT 250 FOR IP): Performed by: INTERNAL MEDICINE

## 2024-07-24 RX ORDER — CIPROFLOXACIN 500 MG/1
500 TABLET, FILM COATED ORAL 2 TIMES DAILY
Qty: 14 TABLET | Refills: 0 | Status: SHIPPED | OUTPATIENT
Start: 2024-07-24 | End: 2024-07-31

## 2024-07-24 RX ORDER — OXYCODONE HYDROCHLORIDE AND ACETAMINOPHEN 5; 325 MG/1; MG/1
1 TABLET ORAL EVERY 8 HOURS PRN
Qty: 9 TABLET | Refills: 0 | Status: SHIPPED | OUTPATIENT
Start: 2024-07-24 | End: 2024-07-27

## 2024-07-24 RX ADMIN — PANTOPRAZOLE SODIUM 40 MG: 40 TABLET, DELAYED RELEASE ORAL at 08:34

## 2024-07-24 RX ADMIN — VANCOMYCIN HYDROCHLORIDE 1500 MG: 10 INJECTION, POWDER, LYOPHILIZED, FOR SOLUTION INTRAVENOUS at 06:24

## 2024-07-24 RX ADMIN — METOPROLOL SUCCINATE 25 MG: 25 TABLET, EXTENDED RELEASE ORAL at 08:34

## 2024-07-24 RX ADMIN — SACUBITRIL AND VALSARTAN 1 TABLET: 24; 26 TABLET, FILM COATED ORAL at 08:34

## 2024-07-24 RX ADMIN — AMIODARONE HYDROCHLORIDE 200 MG: 200 TABLET ORAL at 08:34

## 2024-07-24 RX ADMIN — OXYCODONE HYDROCHLORIDE AND ACETAMINOPHEN 1 TABLET: 5; 325 TABLET ORAL at 06:26

## 2024-07-24 RX ADMIN — SPIRONOLACTONE 25 MG: 25 TABLET ORAL at 08:34

## 2024-07-24 ASSESSMENT — PAIN SCALES - GENERAL
PAINLEVEL_OUTOF10: 5
PAINLEVEL_OUTOF10: 5

## 2024-07-24 ASSESSMENT — PAIN DESCRIPTION - LOCATION
LOCATION: TOE (COMMENT WHICH ONE)
LOCATION: TOE (COMMENT WHICH ONE)

## 2024-07-24 ASSESSMENT — PAIN DESCRIPTION - DESCRIPTORS
DESCRIPTORS: DULL
DESCRIPTORS: DULL;SHOOTING

## 2024-07-24 ASSESSMENT — PAIN DESCRIPTION - ORIENTATION
ORIENTATION: RIGHT
ORIENTATION: RIGHT

## 2024-07-24 NOTE — PROGRESS NOTES
Podiatric Surgery Daily Progress Note  Weston Edmond  Subjective :   Patient seen and examined this am at the bedside. Patient denies any acute overnight events. Patient denies N/V/F/C/SOB. Patient denies calf pain, thigh pain, chest pain.        Objective     /68   Pulse 63   Temp 97.6 °F (36.4 °C) (Oral)   Resp 16   Ht 1.88 m (6' 2\")   Wt 99.8 kg (220 lb)   SpO2 98%   BMI 28.25 kg/m²      I/O:  Intake/Output Summary (Last 24 hours) at 7/24/2024 0801  Last data filed at 7/23/2024 2016  Gross per 24 hour   Intake 857.29 ml   Output --   Net 857.29 ml              Wt Readings from Last 3 Encounters:   07/24/24 99.8 kg (220 lb)   07/20/24 98.4 kg (216 lb 14.9 oz)   07/20/24 98.4 kg (217 lb)       LABS:    Recent Labs     07/23/24  1530 07/24/24  0422   WBC 8.5 7.5   HGB 14.7 13.7   HCT 43.3 41.5    228        Recent Labs     07/24/24  0422      K 4.1      CO2 24   PHOS 3.8   BUN 20   CREATININE 0.7*      No results for input(s): \"INR\", \"APTT\" in the last 72 hours.    Invalid input(s): \"PROT\"        LOWER EXTREMITY EXAMINATION    Integument:  Skin is warm, dry and supple, bilateral. Closed surgical incision to the right partial 2nd toe amputation stump with wound edges well coapted, all sutures intact, no drainage. Mild periwound erythema, typical for postop period. No openings in the skin on the left foot.             Neurologic:  Protective sensation is grossly diminished to light touch at the level of the toes, bilateral.  Vascular:  DP and PT pulses are nonpalpable, bilateral. Severe edema appreciated to the right 2nd toe.  Musculoskeletal:  Patient has 5/5 strength on inversion/everison/dorsiflexion/plantarflexion, bilateral. Hammertoe contractures to b/l feet. S/p right partial 2nd toe amputation       Imaging: R foot xray - no cortical erosions to the distal phalanx of the 2nd toe  - R foot MRI: OM to the distal phalanx of the 2nd toe  - LE arterial duplex: R JAMIE 1.48, no

## 2024-07-24 NOTE — PROGRESS NOTES
Infectious Disease Follow up Notes    CC :  OM toe      Antibiotics:  Vanc 1500 q12      Admit Date:   7/20/2024  Hospital Day: 5    Subjective:   He remains AF   Pain is controlled   He is eager to go home     Objective:     Patient Vitals for the past 8 hrs:   BP Temp Temp src Pulse Resp SpO2 Weight   07/24/24 1136 126/80 97.7 °F (36.5 °C) Oral 65 16 98 % --   07/24/24 0831 113/70 98.1 °F (36.7 °C) Oral 65 16 98 % --   07/24/24 0620 106/68 97.6 °F (36.4 °C) Oral 63 16 98 % --   07/24/24 0605 -- -- -- -- -- -- 99.8 kg (220 lb)       EXAM:  General:  alert, oriented, NAD     HEENT:  PERRL, sclera anicteric   MMM, no thrush   NECK:   Supple      LUNGS:  non-labored breathing    ABD: Soft, flat, NT   EXT: Resolved edema lower R leg. R foot dressed        LINE:   PIV in place         Scheduled Meds:   amiodarone  200 mg Oral Daily    apixaban  5 mg Oral BID    atorvastatin  40 mg Oral Daily    pantoprazole  40 mg Oral QAM AC    sacubitril-valsartan  1 tablet Oral BID    spironolactone  25 mg Oral Daily    sodium chloride flush  5-40 mL IntraVENous 2 times per day    vancomycin  1,500 mg IntraVENous Q12H    metoprolol succinate  25 mg Oral BID       Continuous Infusions:   sodium chloride 10 mL/hr at 07/23/24 2016          Data Review:    Lab Results   Component Value Date    WBC 7.5 07/24/2024    HGB 13.7 07/24/2024    HCT 41.5 07/24/2024    MCV 94.1 07/24/2024     07/24/2024     Lab Results   Component Value Date    CREATININE 0.7 (L) 07/24/2024    BUN 20 07/24/2024     07/24/2024    K 4.1 07/24/2024     07/24/2024    CO2 24 07/24/2024       Hepatic Function Panel:   Lab Results   Component Value Date/Time    ALKPHOS 74 07/21/2024 04:59 AM    ALT 21 07/21/2024 04:59 AM    AST 23 07/21/2024 04:59 AM    BILITOT 0.5 07/21/2024 04:59 AM    BILIDIR <0.2 04/05/2024 11:15 AM    IBILI see below 04/05/2024 11:15 AM

## 2024-07-24 NOTE — DISCHARGE INSTRUCTIONS
Discharge Instructions    -  Keep dressing clean, dry, and intact until your scheduled office visit   - you can purchase a shower boot from your local pharmacy or cover the leg with trash bags when showering   -  You can weight bear as tolerated in your surgical shoe for small amounts of ambulation around the house only   -  Take all medications as prescribed  -  Place ice behind right knee for no more than twenty minutes at a time to help decrease pain and swelling  -  Elevate right lower extremity on top of pillows to help decrease pain and swelling  -  Follow up with Dr. Soni within 1 week of hospital discharge, you can call [phone] to schedule your appointment   -  Call Dr. Soni's office sooner if you have any problems, questions, or concerns        Return to [facility] with new or worsening symptoms.

## 2024-07-24 NOTE — DISCHARGE INSTR - COC
days: 1        Elimination:  Continence:   Bowel: {YES / NO:}  Bladder: {YES / NO:}  Urinary Catheter: {Urinary Catheter:152896949}   Colostomy/Ileostomy/Ileal Conduit: {YES / NO:}       Date of Last BM: ***    Intake/Output Summary (Last 24 hours) at 2024 1242  Last data filed at 2024 0833  Gross per 24 hour   Intake 982.29 ml   Output --   Net 982.29 ml     I/O last 3 completed shifts:  In: 1757.3 [I.V.:267.7; IV Piggyback:1489.6]  Out: -     Safety Concerns:     { ROSENDA Safety Concerns:867940969}    Impairments/Disabilities:      { ROSENDA Impairments/Disabilities:976989084}    Nutrition Therapy:  Current Nutrition Therapy:   { ROSENDA Diet List:672824178}    Routes of Feeding: {[facility] Other Feedings:163066110}  Liquids: {Slp liquid thickness:40951}  Daily Fluid Restriction: {[facility] Yes amt example:608895269}  Last Modified Barium Swallow with Video (Video Swallowing Test): {Done Not Done Date:}    Treatments at the Time of Hospital Discharge:   Respiratory Treatments: ***  Oxygen Therapy:  {Therapy; copd oxygen:21183}  Ventilator:    { CC Vent List:373991365}    Rehab Therapies: {THERAPEUTIC INTERVENTION:3815496307}  Weight Bearing Status/Restrictions: {[facility] Weight Bearin}  Other Medical Equipment (for information only, NOT a DME order):  {EQUIPMENT:278704315}  Other Treatments: ***    Patient's personal belongings (please select all that are sent with patient):  {[facility] Belongings:550177627}    RN SIGNATURE:  {Esignature:439282030}    CASE MANAGEMENT/SOCIAL WORK SECTION    Inpatient Status Date: ***    Readmission Risk Assessment Score:  Readmission Risk              Risk of Unplanned Readmission:  10           Discharging to Facility/ Agency   Name:   Address:  Phone:  Fax:    Dialysis Facility (if applicable)   Name:  Address:  Dialysis Schedule:  Phone:  Fax:    / signature: {Esignature:074234310}    PHYSICIAN SECTION    Prognosis:  {Prognosis:5033647042}    Condition at Discharge: { Patient Condition:593443946}    Rehab Potential (if transferring to Rehab): {Prognosis:0916988032}    Recommended Labs or Other Treatments After Discharge: ***    Physician Certification: I certify the above information and transfer of Weston Edmond  is necessary for the continuing treatment of the diagnosis listed and that he requires {Admit to Appropriate Level of Care:87349} for {GREATER/LESS:902317002} 30 days.     Update Admission H&P: {[facility] DME Changes in HandP:740050969}    PHYSICIAN SIGNATURE:  {Esignature:034413953}

## 2024-07-24 NOTE — PLAN OF CARE
Problem: Pain  Goal: Verbalizes/displays adequate comfort level or baseline comfort level  7/23/2024 2008 by Rasheed Agustin RN  Outcome: Progressing  Flowsheets (Taken 7/23/2024 0925 by Za eWst RN)  Verbalizes/displays adequate comfort level or baseline comfort level:   Encourage patient to monitor pain and request assistance   Assess pain using appropriate pain scale   Administer analgesics based on type and severity of pain and evaluate response   Implement non-pharmacological measures as appropriate and evaluate response     Problem: Safety - Adult  Goal: Free from fall injury  7/23/2024 2008 by Rasheed Agustin RN  Outcome: Progressing  Flowsheets (Taken 7/23/2024 0925 by Za West RN)  Free From Fall Injury: Instruct family/caregiver on patient safety     Problem: Chronic Conditions and Co-morbidities  Goal: Patient's chronic conditions and co-morbidity symptoms are monitored and maintained or improved  Outcome: Progressing  Flowsheets (Taken 7/23/2024 2008)  Care Plan - Patient's Chronic Conditions and Co-Morbidity Symptoms are Monitored and Maintained or Improved:   Monitor and assess patient's chronic conditions and comorbid symptoms for stability, deterioration, or improvement   Collaborate with multidisciplinary team to address chronic and comorbid conditions and prevent exacerbation or deterioration     Problem: ABCDS Injury Assessment  Goal: Absence of physical injury  Outcome: Progressing     Problem: Discharge Planning  Goal: Discharge to home or other facility with appropriate resources  Outcome: Progressing  Flowsheets (Taken 7/23/2024 2008)  Discharge to home or other facility with appropriate resources:   Arrange for needed discharge resources and transportation as appropriate   Identify barriers to discharge with patient and caregiver   Identify discharge learning needs (meds, wound care, etc)     Problem: Skin/Tissue Integrity - Adult  Goal: Incisions, wounds, or drain sites healing

## 2024-07-24 NOTE — PROGRESS NOTES
Received patient on bed alert and oriented x4. Vitals are stable. Denies  pain nor dizziness at the time of assessment. Presence of dressing and post-op shoe on the R foot- clean, dry and intact.    Call bell within reach. Independent and calls out appropriately. Maintained a calm and comfortable environment. Shift assessment updated and documented.

## 2024-07-24 NOTE — PROGRESS NOTES
Pt a/o x4. VSS. All prescriptions, discharge and follow up instructions given to the patient.  The patient verbalizes understanding and denies questions. IV removed.   All belongings collected and sent with the patient. The patient was discharged off the unit by wheelchair and PCA in stable condition.

## 2024-07-24 NOTE — DISCHARGE SUMMARY
List        CONTINUE these medications which have NOT CHANGED    Details   metoprolol succinate (TOPROL XL) 25 MG extended release tablet TAKE ONE TABLET BY MOUTH EVERY MORNING AND AT BEDTIME  Qty: 180 tablet, Refills: 3      atorvastatin (LIPITOR) 40 MG tablet Take 1 tablet by mouth daily  Qty: 30 tablet, Refills: 3      empagliflozin (JARDIANCE) 10 MG tablet Take 1 tablet by mouth daily  Qty: 30 tablet, Refills: 3      apixaban (ELIQUIS) 5 MG TABS tablet Take 1 tablet by mouth 2 times daily  Qty: 60 tablet, Refills: 3      sacubitril-valsartan (ENTRESTO) 24-26 MG per tablet Take 1 tablet by mouth 2 times daily  Qty: 60 tablet, Refills: 3      spironolactone (ALDACTONE) 25 MG tablet Take 1 tablet by mouth daily  Qty: 90 tablet, Refills: 3      furosemide (LASIX) 20 MG tablet Take 1 tablet by mouth See Admin Instructions As needed for weight gain of 3lbs in a day or worse leg swelling/abd bloating  Qty: 60 tablet, Refills: 3      omeprazole (PRILOSEC) 20 MG delayed release capsule Take 2 capsules by mouth every morning (before breakfast)  Qty: 90 capsule, Refills: 1      amiodarone (CORDARONE) 200 MG tablet Take 1 tablet by mouth daily  Qty: 90 tablet, Refills: 3      fluticasone (FLONASE) 50 MCG/ACT nasal spray 1 spray by Each Nostril route daily  Qty: 16 g, Refills: 0    Associated Diagnoses: Chronic sinusitis, unspecified location      Multiple Vitamins-Minerals (THERAPEUTIC MULTIVITAMIN-MINERALS) tablet Take 1 tablet by mouth daily      vitamin D (CHOLECALCIFEROL) 1000 UNIT TABS tablet Take 1 tablet by mouth daily           Current Discharge Medication List        STOP taking these medications       sulfamethoxazole-trimethoprim (BACTRIM DS;SEPTRA DS) 800-160 MG per tablet Comments:   Reason for Stopping:               Significant Test Results    Vascular duplex lower extremity arteries bilateral    Result Date: 7/23/2024    Right side findings: Resting JAMIE is 1.48. (dorsalis pedis artery unable to be evaluated  due to bandages from recent surgery unable to be removed)   Left side findings: Resting JAMIE is 1.30. (dorsalis pedis artery demonstrated doppler silence.)   The bilateral common femoral arteries demonstrate normal multiphasic flow demonstrating no significant aortic-iliac inflow disease.   No evidence of significant arterial stenosis     XR FOOT RIGHT (MIN 3 VIEWS)    Result Date: 7/22/2024  EXAMINATION: 3 XRAY VIEWS OF THE RIGHT FOOT 7/22/2024 4:49 pm COMPARISON: MRI of the right foot dated 07/24/2024 HISTORY: ORDERING SYSTEM PROVIDED HISTORY: Post- Op TECHNOLOGIST PROVIDED HISTORY: -RIGHT- 3 Views In PACU Reason for exam:->Post- Op Reason for Exam: post op FINDINGS: Partial amputation of the right 2nd toe at the level of proximal interphalangeal joint. Mild midfoot spurring.     Partial amputation of the a right 2nd toe at the level of proximal interphalangeal joint.     MRI FOOT RIGHT WO CONTRAST    Result Date: 7/21/2024  EXAMINATION: MRI OF THE RIGHT FOOT WITHOUT CONTRAST, 7/21/2024 11:45 am TECHNIQUE: Multiplanar multisequence MRI of the right foot was performed without the administration of intravenous contrast. COMPARISON: Right foot radiographs 07/20/2024. HISTORY: ORDERING SYSTEM PROVIDED HISTORY: R/O Osteomyelitis of right 2nd toe TECHNOLOGIST PROVIDED HISTORY: Reason for exam:->R/O Osteomyelitis of right 2nd toe What is the area of interest?->Forefoot Decision Support Exception - unselect if not a suspected or confirmed emergency medical condition->Emergency Medical Condition (MA) Reason for Exam: r/o osteomyelitis FINDINGS: LISFRANC JOINT:  Lisfranc ligament is visualized and is normal.  The alignment of the tarsal-metatarsal joint is anatomic. BONE MARROW: Bone marrow edema and confluent decreased T1 signal throughout the 2nd distal phalanx.  No fracture or dislocation.  No suspicious marrow space-occupying lesion. GREATER AND LESSER MTP JOINTS: Mild 1st metatarsophalangeal degenerative changes.  No

## 2024-07-24 NOTE — PROGRESS NOTES
V2.0    [facility] [place]      Name:  Weston Edmond /Age/Sex: 1965  (58 y.o. male)   MRN & CSN:  [id number] & [id number] Encounter Date/Time: 2024 7:25 AM EDT   Location:   PCP: Mia Alberto PA     Attending:Duke Stapleton MD       Hospital Day: 5    Assessment and Recommendations   Weston Edmond is a 58 y.o. male with pmh of s/p right partial 2nd toe amputation on 2024, HLD, Paroxysmal Afib, HFrEF, GERD, Charcot-Liliana Tooth Disease, CAD w angina, HTN and Sleep apnea, who presents with Right foot pain/infection.     Pt is s/p right partial 2nd toe amputation on 2024 d/t Osteomyelitis of the right foor with ulcer and necrosis. by Alena Soni DPM    Updates:  Patient denies any acute overnight events. Patient denies N/V/F/C/SOB. Patient denies calf pain, thigh pain, chest pain.     Plan:     Osteomyelitis of the right 2nd toe   -S/p right partial 2nd toe amputation on 2024  -Currently on Vancomycin  -Currently on Percocet 5-325 and Acetaminophen 650mg for pain management  -Continue surgical site monitoring by podiatry   -Pending ID Consult for Antibiotic mgmt  -Tentative discharge tomorrow     2. Paroxysmal atrial fibrillation   - EKG Not obtained at this admission  - Last Echo from 3/6/2024 showing EF 20-25% with global hypokinesis   - Takes Eliquis and metoprolol at home   - Will continue with Eliquis an metoprolol for now.       3. Hyperlipidemia  -Currently on Lipitor    4. HFrEF- compensated  -No s/s of acute exacerbation at this time.   - Last Echo from 3/6/2024 showing EF 20-25% with global hypokinesis   - Currently on Metoprolol, Entresto and aldactone at home   - Will continue on Metoprolol, Entresto and aldactone during this Inpatient stay   - Strict I&O's   - Daily Weights      5. GERD  -Continued home medication       Diet ADULT DIET; Regular   DVT Prophylaxis [] Lovenox, []  Heparin, [] SCDs, [] Ambulation,  [] Eliquis, [] Xarelto  []

## 2024-07-24 NOTE — PROGRESS NOTES
Pt assessment completed and charted. VSS. Pt a/ox4. Pt reports pain 5/10, pt educated on prn pain meds. Pt reports pain 5/10 tolerable. R foot wrapped and dressing changed by podiatry this morning. Surgical shoe in place. Pt denies any other needs at this time.  Pt calls out appropriately.       Pt is a fall risk;  -Bed in lowest position and wheels locked.  -Call light within reach.   -Bedside table within reach.   -Non-skid footwear in place.

## 2024-07-24 NOTE — PLAN OF CARE
Problem: Pain  Goal: Verbalizes/displays adequate comfort level or baseline comfort level  7/24/2024 0958 by Azalea Patton RN  Outcome: Progressing  Flowsheets (Taken 7/24/2024 0958)  Verbalizes/displays adequate comfort level or baseline comfort level:   Encourage patient to monitor pain and request assistance   Assess pain using appropriate pain scale     Problem: Safety - Adult  Goal: Free from fall injury  7/24/2024 0958 by Azalea Patton RN  Outcome: Progressing  Flowsheets (Taken 7/24/2024 0958)  Free From Fall Injury: Instruct family/caregiver on patient safety     Problem: Chronic Conditions and Co-morbidities  Goal: Patient's chronic conditions and co-morbidity symptoms are monitored and maintained or improved  7/24/2024 0958 by Azalea Patton RN  Flowsheets (Taken 7/23/2024 2008 by Rasheed Agustin RN)  Care Plan - Patient's Chronic Conditions and Co-Morbidity Symptoms are Monitored and Maintained or Improved:   Monitor and assess patient's chronic conditions and comorbid symptoms for stability, deterioration, or improvement   Collaborate with multidisciplinary team to address chronic and comorbid conditions and prevent exacerbation or deterioration     Problem: Discharge Planning  Goal: Discharge to home or other facility with appropriate resources  7/24/2024 0958 by Azalea Patton RN  Outcome: Progressing  Flowsheets (Taken 7/24/2024 0958)  Discharge to home or other facility with appropriate resources: Identify barriers to discharge with patient and caregiver     Problem: Skin/Tissue Integrity - Adult  Goal: Incisions, wounds, or drain sites healing without S/S of infection  7/24/2024 0958 by Azalea Patton RN  Outcome: Progressing  Flowsheets (Taken 7/23/2024 2008 by Rasheed Agustin RN)  Incisions, Wounds, or Drain Sites Healing Without Sign and Symptoms of Infection: ADMISSION and DAILY: Assess and document risk factors for pressure ulcer development

## 2024-07-25 ENCOUNTER — TELEPHONE (OUTPATIENT)
Dept: FAMILY MEDICINE CLINIC | Age: 59
End: 2024-07-25

## 2024-07-25 NOTE — TELEPHONE ENCOUNTER
Care Transitions Initial Follow Up Call    Outreach made within 2 business days of discharge: Yes    Patient: Weston Edmond Patient : 1965   MRN: [id number]  Reason for Admission: Cellulitis   Discharge Date: 24       Spoke with: Weston    Discharge department/facility: [facility] Interactive Patient Contact:  Was patient able to fill all prescriptions: Yes  Was patient instructed to bring all medications to the follow-up visit: Yes  Is patient taking all medications as directed in the discharge summary? Yes  Does patient understand their discharge instructions: Yes  Does patient have questions or concerns that need addressed prior to 7-14 day follow up office visit: no        Scheduled appointment with PCP within 7-14 days    Follow Up  Future Appointments   Date Time Provider Department Center   2024 10:30 AM Mia Alberto PA [facility]  [place] [facility]   2024  8:00 AM BRITTNEY CARDI ECHO 1 MHAZ AND ARLETTE [facility]   2024  8:45 AM Robyn Hoover APRN - CNP Farhat Car [facility]   2024  7:45 AM Erlin Brennan MD [facility]   2025  8:20 AM Gerardo Rossi MD AND PULM [facility]       Amy Awad LPN

## 2024-07-26 LAB
BACTERIA SPEC AEROBE CULT: ABNORMAL
BACTERIA SPEC ANAEROBE CULT: ABNORMAL
GRAM STN SPEC: ABNORMAL
ORGANISM: ABNORMAL

## 2024-07-29 ENCOUNTER — TELEPHONE (OUTPATIENT)
Dept: INFECTIOUS DISEASES | Age: 59
End: 2024-07-29

## 2024-07-30 NOTE — TELEPHONE ENCOUNTER
Called patient. No answer, left  and generically informed him surgical culture results. Left my number for any further questions.

## 2024-08-02 ENCOUNTER — OFFICE VISIT (OUTPATIENT)
Dept: FAMILY MEDICINE CLINIC | Age: 59
End: 2024-08-02
Payer: COMMERCIAL

## 2024-08-02 VITALS
BODY MASS INDEX: 28.25 KG/M2 | DIASTOLIC BLOOD PRESSURE: 72 MMHG | WEIGHT: 220 LBS | OXYGEN SATURATION: 97 % | SYSTOLIC BLOOD PRESSURE: 106 MMHG | HEART RATE: 68 BPM

## 2024-08-02 DIAGNOSIS — S98.131A AMPUTATION OF TOE OF RIGHT FOOT (HCC): Primary | ICD-10-CM

## 2024-08-02 DIAGNOSIS — Z09 HOSPITAL DISCHARGE FOLLOW-UP: ICD-10-CM

## 2024-08-02 PROCEDURE — 1111F DSCHRG MED/CURRENT MED MERGE: CPT | Performed by: PHYSICIAN ASSISTANT

## 2024-08-02 PROCEDURE — 99214 OFFICE O/P EST MOD 30 MIN: CPT | Performed by: PHYSICIAN ASSISTANT

## 2024-08-02 PROCEDURE — 3074F SYST BP LT 130 MM HG: CPT | Performed by: PHYSICIAN ASSISTANT

## 2024-08-02 PROCEDURE — 3078F DIAST BP <80 MM HG: CPT | Performed by: PHYSICIAN ASSISTANT

## 2024-08-02 ASSESSMENT — ENCOUNTER SYMPTOMS: COLOR CHANGE: 1

## 2024-08-02 NOTE — PROGRESS NOTES
Post-Discharge Transitional Care Follow Up      Weston Edmond   YOB: 1965    Date of Office Visit:  8/2/2024  Date of Hospital Admission: 7/20/24  Date of Hospital Discharge: 7/24/24  Readmission Risk Score (high >=14%. Medium >=10%):Readmission Risk Score: 9.7      Care management risk score Rising risk (score 2-5) and Complex Care (Scores >=6): No Risk Score On File     Non face to face  following discharge, date last encounter closed (first attempt may have been earlier): 07/25/2024     Call initiated 2 business days of discharge: Yes     Amputation of toe of right foot (HCC)      -  reviewed imaging and notes from the hospital. LPN rewrapped his foot to alleviate pressure and pain, no evidence of active infection. He will follow up with podiatry in 1 week  Hospital discharge follow-up  -     TX DISCHARGE MEDS RECONCILED W/ CURRENT OUTPATIENT MED LIST      Medical Decision Making: straightforward           Subjective:   HPI    Inpatient course: Discharge summary reviewed- see chart.    Interval history/Current status: The patient is feeling better. He has had follow up with his podiatrist yesterday. She rewrapped his toe but now the dressing feels tight and is causing mild pain. He denies any drainage, fever, lymphadenopathy or discoloration of the foot.    Patient Active Problem List   Diagnosis    Essential hypertension    CMT (Charcot-Liliana-Tooth disease)    Mixed hyperlipidemia    PAF (paroxysmal atrial fibrillation) ([facility])    Dilated cardiomyopathy (HCC)    ANSHU (obstructive sleep apnea)    Class 1 obesity due to excess calories with body mass index (BMI) of 33.0 to 33.9 in adult    Multiple thyroid nodules    Nocturnal hypoxemia    Atrial fibrillation with RVR ([facility])    Leukocytosis    Transaminitis    Elevated brain natriuretic peptide (BNP) level    Coronary artery disease involving native coronary artery of native heart with angina pectoris ([facility])    Overweight (BMI 25.0-29.9)    Ischemic

## 2024-08-05 LAB
FUNGUS SPEC CULT: NORMAL
LOEFFLER MB STN SPEC: NORMAL

## 2024-08-06 ENCOUNTER — HOSPITAL ENCOUNTER (OUTPATIENT)
Dept: CARDIOLOGY | Age: 59
Discharge: HOME | End: 2024-08-08
Payer: COMMERCIAL

## 2024-08-06 VITALS
HEIGHT: 74 IN | SYSTOLIC BLOOD PRESSURE: 113 MMHG | WEIGHT: 220 LBS | DIASTOLIC BLOOD PRESSURE: 70 MMHG | BODY MASS INDEX: 28.23 KG/M2

## 2024-08-06 DIAGNOSIS — I42.9 CARDIOMYOPATHY, UNSPECIFIED TYPE (HCC): ICD-10-CM

## 2024-08-06 LAB
ACID FAST STN SPEC QL: NORMAL
ECHO AO ROOT DIAM: 2.8 CM
ECHO AO ROOT INDEX: 1.24 CM/M2
ECHO BSA: 2.28 M2
ECHO LA AREA 2C: 20.9 CM2
ECHO LA AREA 4C: 19.1 CM2
ECHO LA DIAMETER INDEX: 1.37 CM/M2
ECHO LA DIAMETER: 3.1 CM
ECHO LA MAJOR AXIS: 5.2 CM
ECHO LA MINOR AXIS: 5.8 CM
ECHO LA TO AORTIC ROOT RATIO: 1.11
ECHO LA VOL BP: 62 ML (ref 18–58)
ECHO LA VOL MOD A2C: 62 ML (ref 18–58)
ECHO LA VOL MOD A4C: 56 ML (ref 18–58)
ECHO LA VOL/BSA BIPLANE: 27 ML/M2 (ref 16–34)
ECHO LA VOLUME INDEX MOD A2C: 27 ML/M2 (ref 16–34)
ECHO LA VOLUME INDEX MOD A4C: 25 ML/M2 (ref 16–34)
ECHO LV EDV A2C: 215 ML
ECHO LV EDV A4C: 224 ML
ECHO LV EDV INDEX A4C: 99 ML/M2
ECHO LV EDV NDEX A2C: 95 ML/M2
ECHO LV EJECTION FRACTION A2C: 38 %
ECHO LV EJECTION FRACTION A4C: 37 %
ECHO LV EJECTION FRACTION BIPLANE: 37 % (ref 55–100)
ECHO LV ESV A2C: 134 ML
ECHO LV ESV A4C: 141 ML
ECHO LV ESV INDEX A2C: 59 ML/M2
ECHO LV ESV INDEX A4C: 62 ML/M2
ECHO LV FRACTIONAL SHORTENING: 17 % (ref 28–44)
ECHO LV INTERNAL DIMENSION DIASTOLE INDEX: 2.04 CM/M2
ECHO LV INTERNAL DIMENSION DIASTOLIC: 4.6 CM (ref 4.2–5.9)
ECHO LV INTERNAL DIMENSION SYSTOLIC INDEX: 1.68 CM/M2
ECHO LV INTERNAL DIMENSION SYSTOLIC: 3.8 CM
ECHO LV IVSD: 0.8 CM (ref 0.6–1)
ECHO LV MASS 2D: 117.9 G (ref 88–224)
ECHO LV MASS INDEX 2D: 52.2 G/M2 (ref 49–115)
ECHO LV POSTERIOR WALL DIASTOLIC: 0.8 CM (ref 0.6–1)
ECHO LV RELATIVE WALL THICKNESS RATIO: 0.35
MYCOBACTERIUM SPEC CULT: NORMAL

## 2024-08-06 PROCEDURE — 93325 DOPPLER ECHO COLOR FLOW MAPG: CPT | Performed by: INTERNAL MEDICINE

## 2024-08-06 PROCEDURE — 6360000004 HC RX CONTRAST MEDICATION

## 2024-08-06 PROCEDURE — C8924 2D TTE W OR W/O FOL W/CON,FU: HCPCS

## 2024-08-06 PROCEDURE — 93308 TTE F-UP OR LMTD: CPT | Performed by: INTERNAL MEDICINE

## 2024-08-06 RX ADMIN — PERFLUTREN 1.5 ML: 6.52 INJECTION, SUSPENSION INTRAVENOUS at 08:45

## 2024-08-12 LAB
FUNGUS SPEC CULT: NORMAL
LOEFFLER MB STN SPEC: NORMAL

## 2024-08-13 LAB
ACID FAST STN SPEC QL: NORMAL
MYCOBACTERIUM SPEC CULT: NORMAL

## 2024-08-13 NOTE — PROGRESS NOTES
[facility]  Cardiac Follow-up    Primary Care Doctor:  Mia Alberto PA    Chief Complaint   Patient presents with    Follow-up    Cardiomyopathy        History of Present Illness:  I had the pleasure of seeing Weston Edmond in follow up for Cardiomyopathy. Hx of Afib, HTN, HLD.    Admitted on 3/3/2024 with CHF. EKG showed rapid AF, previously diagnosed with plans for ablation. Echo showed an EF of 20-25%. He had an LHC with PCI of the RCA. He had a ALICIA/CV but promptly returned to AF. He went back into SR after amiodarone was started.  he was scheduled for a repeat cardioversion 3/20/2024 but was founf to be in SR, cardioversion cancelled.   Undwent AF Ablation on 4/19/24.    Since last visit, Spironolactone (aldactone) increased to 25mg daily.  Brilinta changed to plavix.   Echo 8/6/24 showed LVEF 30% from 20-25%.   He had toe amputation 3 weeks ago.   He has been checking b/p at home /60-70; no dizziness or lighteadedness.   No fatigue   HR 60-70    No shortness of breath.   No rash.   No n/v; no diarrhea or constipation  Not needing any lasix.     Home weights: 215-220 lbs  Taking medications as prescribed: Yes  Able to afford medications: Yes    Past Medical History:   has a past medical history of Arthritis, Charcot-Liliana-Tooth disease, CHF (congestive heart failure) (HCC), Class 1 obesity due to excess calories with body mass index (BMI) of 33.0 to 33.9 in adult, Coronary artery disease involving native coronary artery of native heart with angina pectoris (HCC), Hyperlipemia, Hypertension, Mixed hyperlipidemia, Observed sleep apnea, PAF (paroxysmal atrial fibrillation) ([facility]), and Sleep apnea.  Surgical History:   has a past surgical history that includes Colonoscopy; Vasectomy; Breast biopsy; US BIOPSY THYROID (8/16/2021); Colonoscopy (N/A, 8/22/2023); cardioversion (3/7/2024); and Toe amputation (Right, 7/22/2024).   Social History:   reports that he has never smoked. He has been

## 2024-08-14 ENCOUNTER — OFFICE VISIT (OUTPATIENT)
Dept: CARDIOLOGY CLINIC | Age: 59
End: 2024-08-14
Payer: COMMERCIAL

## 2024-08-14 VITALS
BODY MASS INDEX: 28.04 KG/M2 | OXYGEN SATURATION: 97 % | DIASTOLIC BLOOD PRESSURE: 62 MMHG | HEART RATE: 80 BPM | WEIGHT: 218.5 LBS | SYSTOLIC BLOOD PRESSURE: 100 MMHG | HEIGHT: 74 IN

## 2024-08-14 DIAGNOSIS — I25.10 CORONARY ARTERY DISEASE INVOLVING NATIVE CORONARY ARTERY OF NATIVE HEART WITHOUT ANGINA PECTORIS: ICD-10-CM

## 2024-08-14 DIAGNOSIS — I25.5 ISCHEMIC CARDIOMYOPATHY: Primary | ICD-10-CM

## 2024-08-14 DIAGNOSIS — I50.20 HFREF (HEART FAILURE WITH REDUCED EJECTION FRACTION) (HCC): ICD-10-CM

## 2024-08-14 PROCEDURE — 3074F SYST BP LT 130 MM HG: CPT | Performed by: NURSE PRACTITIONER

## 2024-08-14 PROCEDURE — 3078F DIAST BP <80 MM HG: CPT | Performed by: NURSE PRACTITIONER

## 2024-08-14 PROCEDURE — 99214 OFFICE O/P EST MOD 30 MIN: CPT | Performed by: NURSE PRACTITIONER

## 2024-08-14 RX ORDER — ATORVASTATIN CALCIUM 40 MG/1
40 TABLET, FILM COATED ORAL DAILY
Qty: 90 TABLET | Refills: 3 | Status: SHIPPED | OUTPATIENT
Start: 2024-08-14

## 2024-08-14 NOTE — PATIENT INSTRUCTIONS
Daily weight and blood pressure monitoring   lasix to as needed for weight gain 3lbs in a day  Spironolactone (aldactone) 25mg daily   Continue toprol  Continue jardiance  Adjust entresto to next dose; Take 1/2 tab of the 49/51mg in the morning and then take 1 tab of the 49/51mg at bedtime   Check BMP and BNP in 2-3 weeks- not fasting  If you do not have any dizziness or lightheadedness on the increase Entresto and your blood pressures are not changed then you can increase the entresto to 1 full tab twice a day (49/51mg dose). If you have dizziness or low blood pressures 80/50's - notify the office.   Will continue to maximize medications over the next 3 months and then repeat EF evaluation with limited Echo   Follow up with Dr. Brennan as planned  Follow up with CHF team beginning of Oct. For med titration

## 2024-08-15 ENCOUNTER — TELEPHONE (OUTPATIENT)
Dept: ADMINISTRATIVE | Age: 59
End: 2024-08-15

## 2024-08-15 NOTE — TELEPHONE ENCOUNTER
Submitted PA for ENTRESTO  Via [facility] Key: [id number]  STATUS: PENDING.    Follow up done daily; if no decision with in three days we will refax.  If another three days goes by with no decision will call the insurance for status.

## 2024-08-19 LAB
FUNGUS SPEC CULT: NORMAL
LOEFFLER MB STN SPEC: NORMAL

## 2024-08-20 LAB
ACID FAST STN SPEC QL: NORMAL
MYCOBACTERIUM SPEC CULT: NORMAL

## 2024-08-26 LAB
FUNGUS SPEC CULT: NORMAL
LOEFFLER MB STN SPEC: NORMAL

## 2024-08-27 LAB
ACID FAST STN SPEC QL: NORMAL
MYCOBACTERIUM SPEC CULT: NORMAL

## 2024-08-28 DIAGNOSIS — I25.5 ISCHEMIC CARDIOMYOPATHY: ICD-10-CM

## 2024-08-28 DIAGNOSIS — I50.20 HFREF (HEART FAILURE WITH REDUCED EJECTION FRACTION) (HCC): ICD-10-CM

## 2024-08-28 LAB
ANION GAP SERPL CALCULATED.3IONS-SCNC: 13 MMOL/L (ref 3–16)
BUN SERPL-MCNC: 18 MG/DL (ref 7–20)
CALCIUM SERPL-MCNC: 9.7 MG/DL (ref 8.3–10.6)
CHLORIDE SERPL-SCNC: 106 MMOL/L (ref 99–110)
CO2 SERPL-SCNC: 24 MMOL/L (ref 21–32)
CREAT SERPL-MCNC: 1 MG/DL (ref 0.9–1.3)
GFR SERPLBLD CREATININE-BSD FMLA CKD-EPI: 87 ML/MIN/{1.73_M2}
GLUCOSE SERPL-MCNC: 95 MG/DL (ref 70–99)
NT-PROBNP SERPL-MCNC: 108 PG/ML (ref 0–124)
POTASSIUM SERPL-SCNC: 4.1 MMOL/L (ref 3.5–5.1)
SODIUM SERPL-SCNC: 143 MMOL/L (ref 136–145)

## 2024-08-30 ENCOUNTER — PATIENT MESSAGE (OUTPATIENT)
Dept: CARDIOLOGY CLINIC | Age: 59
End: 2024-08-30

## 2024-09-03 ENCOUNTER — OFFICE VISIT (OUTPATIENT)
Dept: FAMILY MEDICINE CLINIC | Age: 59
End: 2024-09-03
Payer: COMMERCIAL

## 2024-09-03 VITALS
TEMPERATURE: 97.5 F | HEART RATE: 69 BPM | BODY MASS INDEX: 28.85 KG/M2 | OXYGEN SATURATION: 98 % | HEIGHT: 74 IN | DIASTOLIC BLOOD PRESSURE: 60 MMHG | SYSTOLIC BLOOD PRESSURE: 112 MMHG | WEIGHT: 224.8 LBS
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
## 2024-09-03 LAB
ACID FAST STN SPEC QL: NORMAL
MYCOBACTERIUM SPEC CULT: NORMAL

## 2024-09-03 PROCEDURE — 3074F SYST BP LT 130 MM HG: CPT | Performed by: PHYSICIAN ASSISTANT

## 2024-09-03 PROCEDURE — 3078F DIAST BP <80 MM HG: CPT | Performed by: PHYSICIAN ASSISTANT

## 2024-09-03 PROCEDURE — 99212 OFFICE O/P EST SF 10 MIN: CPT | Performed by: PHYSICIAN ASSISTANT

## 2024-09-03 SDOH — ECONOMIC STABILITY: FOOD INSECURITY: WITHIN THE PAST 12 MONTHS, YOU WORRIED THAT YOUR FOOD WOULD RUN OUT BEFORE YOU GOT MONEY TO BUY MORE.: NEVER TRUE

## 2024-09-03 SDOH — ECONOMIC STABILITY: INCOME INSECURITY: HOW HARD IS IT FOR YOU TO PAY FOR THE VERY BASICS LIKE FOOD, HOUSING, MEDICAL CARE, AND HEATING?: NOT HARD AT ALL

## 2024-09-03 SDOH — ECONOMIC STABILITY: FOOD INSECURITY: WITHIN THE PAST 12 MONTHS, THE FOOD YOU BOUGHT JUST DIDN'T LAST AND YOU DIDN'T HAVE MONEY TO GET MORE.: NEVER TRUE

## 2024-09-03 ASSESSMENT — PATIENT HEALTH QUESTIONNAIRE - PHQ9
SUM OF ALL RESPONSES TO PHQ QUESTIONS 1-9: 0
6. FEELING BAD ABOUT YOURSELF - OR THAT YOU ARE A FAILURE OR HAVE LET YOURSELF OR YOUR FAMILY DOWN: NOT AT ALL
3. TROUBLE FALLING OR STAYING ASLEEP: NOT AT ALL
SUM OF ALL RESPONSES TO PHQ QUESTIONS 1-9: 0
SUM OF ALL RESPONSES TO PHQ9 QUESTIONS 1 & 2: 0
4. FEELING TIRED OR HAVING LITTLE ENERGY: NOT AT ALL
SUM OF ALL RESPONSES TO PHQ QUESTIONS 1-9: 0
2. FEELING DOWN, DEPRESSED OR HOPELESS: NOT AT ALL
SUM OF ALL RESPONSES TO PHQ QUESTIONS 1-9: 0
9. THOUGHTS THAT YOU WOULD BE BETTER OFF DEAD, OR OF HURTING YOURSELF: NOT AT ALL
7. TROUBLE CONCENTRATING ON THINGS, SUCH AS READING THE NEWSPAPER OR WATCHING TELEVISION: NOT AT ALL
8. MOVING OR SPEAKING SO SLOWLY THAT OTHER PEOPLE COULD HAVE NOTICED. OR THE OPPOSITE, BEING SO FIGETY OR RESTLESS THAT YOU HAVE BEEN MOVING AROUND A LOT MORE THAN USUAL: NOT AT ALL
5. POOR APPETITE OR OVEREATING: NOT AT ALL
1. LITTLE INTEREST OR PLEASURE IN DOING THINGS: NOT AT ALL

## 2024-09-03 ASSESSMENT — ENCOUNTER SYMPTOMS: COLOR CHANGE: 1

## 2024-09-03 NOTE — PROGRESS NOTES
Weston Edmond (:  1965) is a 58 y.o. male,Established patient, here for evaluation of the following chief complaint(s):  Other (Patient has 1.5 week history of \"lump\" at left base of neck.  It has gotten smaller since last week.  ASYX )         Assessment & Plan  Bug bite, initial encounter    No evidence of infection at this time. Recommended warm compresses, follow up for erythema, pain or discharge           Subjective   HPI  Patient is here for evaluation of a lump in the neck.  Location: posterior distal neck, left side  Timin.5 weeks  Has shrunk in size  Denies: itching, draining or pain  Tx: nothing specifically but he is on an antibiotic for his foot    Review of Systems   Constitutional:  Negative for diaphoresis, fever and unexpected weight change.   Musculoskeletal:  Negative for neck pain and neck stiffness.   Skin:  Positive for color change. Negative for rash and wound.          Objective   Physical Exam  Vitals reviewed.   Constitutional:       Appearance: Normal appearance.   Skin:     Comments: Red macule with center opening, no drainage   Neurological:      Mental Status: He is alert.                  An electronic signature was used to authenticate this note.    --EAN Aparicio

## 2024-09-03 NOTE — TELEPHONE ENCOUNTER
Per [facility] note on 06/25/2024    Plan:  ~Stop Brilinta when you are finished with what you have at home   ~Start Plavix 75 mg daily after you stop Brilinta               ~Call us one week prior to stopping Brilinta so we can send through a script. For your first dose of Plavix- you will take 4 pills which is 300 mg

## 2024-09-04 RX ORDER — CLOPIDOGREL BISULFATE 75 MG/1
TABLET ORAL
Qty: 94 TABLET | Refills: 3 | Status: SHIPPED | OUTPATIENT
Start: 2024-09-04

## 2024-09-04 NOTE — TELEPHONE ENCOUNTER
Spoke to patient, he is now aware of [facility] change from Brilinta to Plavix. He wants it called in to [facility].  Patient has 8 days left and is aware not to take both prescriptions together.

## 2024-09-10 LAB
ACID FAST STN SPEC QL: NORMAL
MYCOBACTERIUM SPEC CULT: NORMAL

## 2024-09-24 ENCOUNTER — OFFICE VISIT (OUTPATIENT)
Age: 59
End: 2024-09-24

## 2024-09-24 VITALS
HEART RATE: 89 BPM | SYSTOLIC BLOOD PRESSURE: 120 MMHG | OXYGEN SATURATION: 98 % | DIASTOLIC BLOOD PRESSURE: 64 MMHG | TEMPERATURE: 97.4 F

## 2024-09-24 DIAGNOSIS — L03.115 CELLULITIS OF RIGHT FOOT: Primary | ICD-10-CM

## 2024-09-24 DIAGNOSIS — I73.9 PERIPHERAL VASCULAR DISEASE OF FOOT (HCC): ICD-10-CM

## 2024-10-01 NOTE — PROGRESS NOTES
systolic function.   The right atrium is mildly dilated.   Trivial mitral regurgitation.     Stress test 10/5/2023   Summary   Excellent functional capacity at 10 METs   Mild to moderately reduced LVEF 46% (EF may be underestimated on this study)   Global hypokinesis   Diaphragmatic attenuation artifact   No ischemia   Findings are suggestive of nonischemic cardiomyopathy   Consider echo/cardiac MRI to further assess     Echocardiogram 3/6/2024  Summary   Difficult to accurately assess left ventricular systolic function due to   arrhythmia but appears severely reduced with an ejection fraction of 20 - 25   %.   There is global hypokinesis with regional variations.   Left ventricular diastolic filling pressure is elevated.   Changes noted from previous echo on 9- in left ventricular function.   Mild mitral and pulmonic regurgitation.   The left atrium is moderately dilated.   Bi-atrial enlargement.   Right ventricular systolic function is mild to moderately reduced .   Systolic pulmonic artery pressure (SPAP) is normal estimated at 34 mmHg   (Right atrial pressure of 8 mmHg).   The right atrium is mildly dilated.     ALICIA 3/7/2024  Summary   ALICIA performed by Dr. Mixon.   Ejection fraction is visually estimated at 20-25%.   Mild spontaneous echo contrast was present in LA cavity and LA appendage.   There is no evidence of mass or thrombus in the left atrium or appendage.   A bubble study was performed and showed no evidence of shunting.   Mild mitral regurgitation.     Left heart cath 3/11/2024  CONCLUSIONS:     1.  Successful IVUS guided drug-eluting stent insertion to the right coronary artery and circumflex coronary artery.  ASSESSMENT/RECOMMENDATIONS:  1.  Recommend dual antiplatelet therapy with aspirin 81 mg and Brilinta 90 mg twice daily indefinitely.  2.  With the patient's underlying paroxysmal atrial fibrillation, he will require oral anticoagulation.  We can consider single antiplatelet drug therapy

## 2024-10-02 ENCOUNTER — OFFICE VISIT (OUTPATIENT)
Dept: CARDIOLOGY CLINIC | Age: 59
End: 2024-10-02
Payer: COMMERCIAL

## 2024-10-02 VITALS
WEIGHT: 221.5 LBS | HEART RATE: 79 BPM | BODY MASS INDEX: 28.43 KG/M2 | OXYGEN SATURATION: 97 % | HEIGHT: 74 IN | DIASTOLIC BLOOD PRESSURE: 60 MMHG | SYSTOLIC BLOOD PRESSURE: 86 MMHG

## 2024-10-02 DIAGNOSIS — I48.0 PAF (PAROXYSMAL ATRIAL FIBRILLATION) (HCC): ICD-10-CM

## 2024-10-02 DIAGNOSIS — I50.22 CHRONIC SYSTOLIC CONGESTIVE HEART FAILURE (HCC): ICD-10-CM

## 2024-10-02 DIAGNOSIS — I42.9 CARDIOMYOPATHY, UNSPECIFIED TYPE (HCC): Primary | ICD-10-CM

## 2024-10-02 PROCEDURE — 99214 OFFICE O/P EST MOD 30 MIN: CPT | Performed by: NURSE PRACTITIONER

## 2024-10-02 PROCEDURE — 3074F SYST BP LT 130 MM HG: CPT | Performed by: NURSE PRACTITIONER

## 2024-10-02 PROCEDURE — 3078F DIAST BP <80 MM HG: CPT | Performed by: NURSE PRACTITIONER

## 2024-10-02 RX ORDER — LEVOFLOXACIN 750 MG/1
750 TABLET, FILM COATED ORAL DAILY
COMMUNITY
Start: 2024-09-27 | End: 2024-10-11

## 2024-10-02 NOTE — PATIENT INSTRUCTIONS
Plan:   Daily weight and blood pressure monitoring   lasix to as needed for weight gain 3lbs in a day  Spironolactone (aldactone) 25mg daily   Continue toprol  Continue jardiance  Once you are completed with your antibiotic then try to increase Entresto to 49/51mg in the morning and then take 1 tab of the 49/51mg at bedtime   If you develop any dizziness or lightheadendess, worse fatigue then reduce the entresto back down to 1/2 tab in the pm and 1 tab in the am.   Call to schedule echocardiogram for prior to next appointment [id number] to evaluate EF   Follow up with Dr. Brennan as planned  Continue plavix and eliquis  Amiodarone per EP team   Follow up with EP in Dec.

## 2024-11-21 ENCOUNTER — HOSPITAL ENCOUNTER (OUTPATIENT)
Dept: CARDIOLOGY | Age: 59
Discharge: HOME | End: 2024-11-23
Payer: COMMERCIAL

## 2024-11-21 VITALS
BODY MASS INDEX: 28.36 KG/M2 | DIASTOLIC BLOOD PRESSURE: 64 MMHG | SYSTOLIC BLOOD PRESSURE: 120 MMHG | WEIGHT: 221 LBS | HEIGHT: 74 IN

## 2024-11-21 DIAGNOSIS — I50.22 CHRONIC SYSTOLIC CONGESTIVE HEART FAILURE (HCC): ICD-10-CM

## 2024-11-21 DIAGNOSIS — I42.9 CARDIOMYOPATHY, UNSPECIFIED TYPE (HCC): ICD-10-CM

## 2024-11-21 LAB
ECHO AO ASC DIAM: 3.1 CM
ECHO AO ASCENDING AORTA INDEX: 1.37 CM/M2
ECHO AO ROOT DIAM: 2.7 CM
ECHO AO ROOT INDEX: 1.19 CM/M2
ECHO AV CUSP MM: 2 CM
ECHO AV PEAK GRADIENT: 10 MMHG
ECHO AV PEAK VELOCITY: 1.6 M/S
ECHO AV VELOCITY RATIO: 0.56
ECHO BSA: 2.29 M2
ECHO EST RA PRESSURE: 3 MMHG
ECHO LA AREA 4C: 21.4 CM2
ECHO LA DIAMETER INDEX: 1.72 CM/M2
ECHO LA DIAMETER: 3.9 CM
ECHO LA MAJOR AXIS: 6 CM
ECHO LA MINOR AXIS: 3.9 CM
ECHO LA TO AORTIC ROOT RATIO: 1.44
ECHO LA VOL BP: 63 ML (ref 18–58)
ECHO LA VOL MOD A4C: 63 ML (ref 18–58)
ECHO LA VOL/BSA BIPLANE: 28 ML/M2 (ref 16–34)
ECHO LA VOLUME INDEX MOD A4C: 28 ML/M2 (ref 16–34)
ECHO LV E' LATERAL VELOCITY: 8.59 CM/S
ECHO LV E' SEPTAL VELOCITY: 9.25 CM/S
ECHO LV EDV A2C: 142 ML
ECHO LV EDV A4C: 138 ML
ECHO LV EDV INDEX A4C: 61 ML/M2
ECHO LV EDV NDEX A2C: 63 ML/M2
ECHO LV EF PHYSICIAN: 44 %
ECHO LV EJECTION FRACTION A2C: 43 %
ECHO LV EJECTION FRACTION A4C: 44 %
ECHO LV EJECTION FRACTION BIPLANE: 44 % (ref 55–100)
ECHO LV ESV A2C: 81 ML
ECHO LV ESV A4C: 77 ML
ECHO LV ESV INDEX A2C: 36 ML/M2
ECHO LV ESV INDEX A4C: 34 ML/M2
ECHO LV FRACTIONAL SHORTENING: 16 % (ref 28–44)
ECHO LV GLOBAL LONGITUDINAL STRAIN (GLS): -10.3 %
ECHO LV GLOBAL LONGITUDINAL STRAIN (GLS): -13.3 %
ECHO LV GLOBAL LONGITUDINAL STRAIN (GLS): -14.7 %
ECHO LV GLOBAL LONGITUDINAL STRAIN (GLS): -15 %
ECHO LV INTERNAL DIMENSION DIASTOLE INDEX: 2.47 CM/M2
ECHO LV INTERNAL DIMENSION DIASTOLIC: 5.6 CM (ref 4.2–5.9)
ECHO LV INTERNAL DIMENSION SYSTOLIC INDEX: 2.07 CM/M2
ECHO LV INTERNAL DIMENSION SYSTOLIC: 4.7 CM
ECHO LV ISOVOLUMETRIC RELAXATION TIME (IVRT): 90 MS
ECHO LV IVSD: 0.8 CM (ref 0.6–1)
ECHO LV MASS 2D: 165 G (ref 88–224)
ECHO LV MASS INDEX 2D: 72.7 G/M2 (ref 49–115)
ECHO LV POSTERIOR WALL DIASTOLIC: 0.8 CM (ref 0.6–1)
ECHO LV RELATIVE WALL THICKNESS RATIO: 0.29
ECHO LVOT PEAK GRADIENT: 3 MMHG
ECHO LVOT PEAK VELOCITY: 0.9 M/S
ECHO RA AREA 4C: 11.9 CM2
ECHO RA END SYSTOLIC VOLUME APICAL 4 CHAMBER INDEX BSA: 10 ML/M2
ECHO RA VOLUME: 23 ML
ECHO RIGHT VENTRICULAR SYSTOLIC PRESSURE (RVSP): 21 MMHG
ECHO RV FREE WALL PEAK S': 11.3 CM/S
ECHO RV TAPSE: 2.6 CM (ref 1.7–?)
ECHO TV REGURGITANT MAX VELOCITY: 2.1 M/S
ECHO TV REGURGITANT PEAK GRADIENT: 18 MMHG

## 2024-11-21 PROCEDURE — C8929 TTE W OR WO FOL WCON,DOPPLER: HCPCS

## 2024-11-21 NOTE — RESULT ENCOUNTER NOTE
Please call patient with echocardiogram results.  Echo shows heart function has improved up to 40-45%!     Continue current regimen.

## 2024-11-22 NOTE — PROGRESS NOTES
[facility]   Cardiac Follow up     Referring Provider:  Mia Alberto PA     Chief Complaint   Patient presents with    Follow-up    Hypertension    Hyperlipidemia    Cardiomyopathy    Coronary Artery Disease      Weston Edmond   1965    History of Present Illness:    Weston Edmond is a 59 y.o. malew ho is here today for follow up for a past medical history of coronary artery disease- LOKESH left circumflex and RCA 3/2024, ischemic cardiomyopathy, hypertension, hyperlipidemia, sleep apnea, elevated liver enzymes. He was initially seen by PCP on 8/2023 at that time found to be in atrial fibrillation.  EKG with atrial fibrillation\" controlled rates, heart rate 106.  He was started on metoprolol 25 mg daily.  He also underwent an echo in September which demonstrated normal LVEF with some wall motion abnormalities, subsequent stress test (Jonaatn protocol) with excellent METS, stopped for target heart rate, and 9 minutes, no evidence of ischemia but had mildly reduced LVEF of 40 to 45%, referred to general cardiology.   Admitted on 3/3/2024 with CHF. BNP elevated at 6407. CXR showed pulmonary vascular congestion. EKG showed rapid AF, previously diagnosed with plans for ablation. Echo showed an EF of 20-25%. He had an LHC with PCI of the RCA. He had a ALICIA/CV but promptly returned to AF. He went back into SR after amiodarone was started.  he was scheduled for a repeat cardioversion 3/20/2024 but was founf to be in SR, cardioversion cancelled. He underwent a AF ablaltion- 4/19/2024.    Repeat echocardiogram 1121/2024 showed an EF of 40-45%,. Today he states he has been feeling well since his last visit. His blood pressure at home is running- 100-110/60-65, heart rate in the 70's. No dizziness. States he feels he is tolerating increase in entresto and addition of aldactone at his visit with Lorna in 10/2024. 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 Patient currently

## 2024-11-25 ENCOUNTER — TELEPHONE (OUTPATIENT)
Dept: CARDIOLOGY CLINIC | Age: 59
End: 2024-11-25

## 2024-11-25 ENCOUNTER — OFFICE VISIT (OUTPATIENT)
Dept: CARDIOLOGY CLINIC | Age: 59
End: 2024-11-25
Payer: COMMERCIAL

## 2024-11-25 VITALS
WEIGHT: 227 LBS | SYSTOLIC BLOOD PRESSURE: 100 MMHG | BODY MASS INDEX: 29.13 KG/M2 | HEART RATE: 71 BPM | HEIGHT: 74 IN | OXYGEN SATURATION: 99 % | DIASTOLIC BLOOD PRESSURE: 62 MMHG

## 2024-11-25 DIAGNOSIS — I25.5 ISCHEMIC CARDIOMYOPATHY: ICD-10-CM

## 2024-11-25 DIAGNOSIS — I48.0 PAF (PAROXYSMAL ATRIAL FIBRILLATION) (HCC): ICD-10-CM

## 2024-11-25 DIAGNOSIS — I25.119 CORONARY ARTERY DISEASE INVOLVING NATIVE CORONARY ARTERY OF NATIVE HEART WITH ANGINA PECTORIS (HCC): ICD-10-CM

## 2024-11-25 DIAGNOSIS — I25.119 CORONARY ARTERY DISEASE INVOLVING NATIVE CORONARY ARTERY OF NATIVE HEART WITH ANGINA PECTORIS (HCC): Primary | ICD-10-CM

## 2024-11-25 DIAGNOSIS — I42.0 DILATED CARDIOMYOPATHY (HCC): ICD-10-CM

## 2024-11-25 DIAGNOSIS — I10 ESSENTIAL HYPERTENSION: ICD-10-CM

## 2024-11-25 LAB
ALBUMIN SERPL-MCNC: 4.4 G/DL (ref 3.4–5)
ALBUMIN/GLOB SERPL: 2 {RATIO} (ref 1.1–2.2)
ALP SERPL-CCNC: 75 U/L (ref 40–129)
ALT SERPL-CCNC: 44 U/L (ref 10–40)
ANION GAP SERPL CALCULATED.3IONS-SCNC: 11 MMOL/L (ref 3–16)
AST SERPL-CCNC: 33 U/L (ref 15–37)
BILIRUB SERPL-MCNC: 0.4 MG/DL (ref 0–1)
BUN SERPL-MCNC: 18 MG/DL (ref 7–20)
CALCIUM SERPL-MCNC: 9.7 MG/DL (ref 8.3–10.6)
CHLORIDE SERPL-SCNC: 106 MMOL/L (ref 99–110)
CO2 SERPL-SCNC: 25 MMOL/L (ref 21–32)
CREAT SERPL-MCNC: 1 MG/DL (ref 0.9–1.3)
GFR SERPLBLD CREATININE-BSD FMLA CKD-EPI: 87 ML/MIN/{1.73_M2}
GLUCOSE SERPL-MCNC: 81 MG/DL (ref 70–99)
POTASSIUM SERPL-SCNC: 5.3 MMOL/L (ref 3.5–5.1)
PROT SERPL-MCNC: 6.6 G/DL (ref 6.4–8.2)
SODIUM SERPL-SCNC: 142 MMOL/L (ref 136–145)

## 2024-11-25 PROCEDURE — 99214 OFFICE O/P EST MOD 30 MIN: CPT | Performed by: INTERNAL MEDICINE

## 2024-11-25 PROCEDURE — 3074F SYST BP LT 130 MM HG: CPT | Performed by: INTERNAL MEDICINE

## 2024-11-25 PROCEDURE — 3078F DIAST BP <80 MM HG: CPT | Performed by: INTERNAL MEDICINE

## 2024-11-25 RX ORDER — AMIODARONE HYDROCHLORIDE 200 MG/1
200 TABLET ORAL DAILY
Qty: 90 TABLET | Refills: 3 | Status: CANCELLED | OUTPATIENT
Start: 2024-11-25

## 2024-11-25 RX ORDER — CLOPIDOGREL BISULFATE 75 MG/1
TABLET ORAL
Qty: 90 TABLET | Refills: 3 | Status: SHIPPED | OUTPATIENT
Start: 2024-11-25

## 2024-11-25 RX ORDER — ATORVASTATIN CALCIUM 40 MG/1
40 TABLET, FILM COATED ORAL DAILY
Qty: 90 TABLET | Refills: 3 | Status: SHIPPED | OUTPATIENT
Start: 2024-11-25

## 2024-11-25 RX ORDER — METOPROLOL SUCCINATE 25 MG/1
25 TABLET, EXTENDED RELEASE ORAL DAILY
Qty: 180 TABLET | Refills: 3 | Status: SHIPPED | OUTPATIENT
Start: 2024-11-25

## 2024-11-25 RX ORDER — SPIRONOLACTONE 25 MG/1
25 TABLET ORAL DAILY
Qty: 90 TABLET | Refills: 3 | Status: SHIPPED | OUTPATIENT
Start: 2024-11-25

## 2024-11-25 NOTE — PATIENT INSTRUCTIONS
Plan:  Labs- CMP  ~Discussed Watchman Device   ~We discussed each of your medications and the indication for each one   ~Continue to take Lasix as needed for weight gain and swelling   Cardiac medications reviewed including indications and pertinent side effects. Medication list updated at this visit.   Patient verbalizes understanding of the need for treatment and education has been provided at today's visit. Additional education material will be provided in after visit summary.    Check blood pressure and heart rate at home a few times per week- keep a log with dates and times and bring to office visit   Regular exercise and following a healthy diet encouraged   Follow up with me in 1 year   ~Follow up with Earline and scheduled

## 2024-11-25 NOTE — TELEPHONE ENCOUNTER
Received denial for echo   Peer to peer started. Called [phone]     Tracking # [id number]     Case reviewed:  Previous echo 3/2024 showed LVEF 20-25%  Echo 8/2024 showed LVEF 30%    Spoke with Dr. Gray;  Repeat echo ordered for evaluation after max tolerated medication titration.    They couldn't open the clinical notes and need to have notes from 10/2024 faxed to [phone]    Approved   Authorization # [id number]  Valid until 12/14/2024

## 2024-11-26 DIAGNOSIS — E87.5 HYPERKALEMIA: Primary | ICD-10-CM

## 2024-12-09 DIAGNOSIS — E87.5 HYPERKALEMIA: ICD-10-CM

## 2024-12-09 LAB
ANION GAP SERPL CALCULATED.3IONS-SCNC: 13 MMOL/L (ref 3–16)
BUN SERPL-MCNC: 21 MG/DL (ref 7–20)
CALCIUM SERPL-MCNC: 9.8 MG/DL (ref 8.3–10.6)
CHLORIDE SERPL-SCNC: 104 MMOL/L (ref 99–110)
CO2 SERPL-SCNC: 26 MMOL/L (ref 21–32)
CREAT SERPL-MCNC: 1.1 MG/DL (ref 0.9–1.3)
GFR SERPLBLD CREATININE-BSD FMLA CKD-EPI: 77 ML/MIN/{1.73_M2}
GLUCOSE SERPL-MCNC: 82 MG/DL (ref 70–99)
POTASSIUM SERPL-SCNC: 5.2 MMOL/L (ref 3.5–5.1)
SODIUM SERPL-SCNC: 143 MMOL/L (ref 136–145)

## 2024-12-10 RX ORDER — SPIRONOLACTONE 25 MG/1
12.5 TABLET ORAL DAILY
Qty: 90 TABLET | Refills: 3
Start: 2024-12-10

## 2024-12-13 RX ORDER — AMIODARONE HYDROCHLORIDE 200 MG/1
200 TABLET ORAL DAILY
Qty: 90 TABLET | Refills: 3 | Status: SHIPPED | OUTPATIENT
Start: 2024-12-13

## 2024-12-13 NOTE — TELEPHONE ENCOUNTER
Lito from [facility] outpatient pharmacy is requesting refill of Amiodarone 200mg. Preferred pharmacy is    [facility] - [address] - P [phone] - F [phone]     Last ov 11/25/2024 mgh  Next ov 11/182025 mgh

## 2025-01-08 ENCOUNTER — OFFICE VISIT (OUTPATIENT)
Dept: CARDIOLOGY CLINIC | Age: 60
End: 2025-01-08
Payer: COMMERCIAL

## 2025-01-08 VITALS
SYSTOLIC BLOOD PRESSURE: 98 MMHG | HEIGHT: 74 IN | WEIGHT: 228.5 LBS | HEART RATE: 76 BPM | OXYGEN SATURATION: 100 % | DIASTOLIC BLOOD PRESSURE: 54 MMHG | BODY MASS INDEX: 29.33 KG/M2

## 2025-01-08 DIAGNOSIS — I25.5 ISCHEMIC CARDIOMYOPATHY: ICD-10-CM

## 2025-01-08 DIAGNOSIS — Z79.899 ON AMIODARONE THERAPY: ICD-10-CM

## 2025-01-08 DIAGNOSIS — I25.119 CORONARY ARTERY DISEASE INVOLVING NATIVE CORONARY ARTERY OF NATIVE HEART WITH ANGINA PECTORIS (HCC): ICD-10-CM

## 2025-01-08 DIAGNOSIS — I50.20 HFREF (HEART FAILURE WITH REDUCED EJECTION FRACTION) (HCC): Primary | ICD-10-CM

## 2025-01-08 PROCEDURE — 3074F SYST BP LT 130 MM HG: CPT | Performed by: NURSE PRACTITIONER

## 2025-01-08 PROCEDURE — 99214 OFFICE O/P EST MOD 30 MIN: CPT | Performed by: NURSE PRACTITIONER

## 2025-01-08 PROCEDURE — 3078F DIAST BP <80 MM HG: CPT | Performed by: NURSE PRACTITIONER

## 2025-01-08 NOTE — PATIENT INSTRUCTIONS
Plan:   Daily weight and blood pressure monitoring   lasix to as needed for weight gain 3lbs in a day  Spironolactone (aldactone) 12.5 mg daily   Continue toprol  Continue jardiance  Continue entresto 49/51mg twice a day   Check BMP next week   Continue plavix and eliquis  Amiodarone per EP team    Stay as active as possible  Follow up 6 months

## 2025-01-08 NOTE — PROGRESS NOTES
[facility]  Cardiac Follow-up    Primary Care Doctor:  Mia Alberto PA    Chief Complaint   Patient presents with    Follow-up    Cardiomyopathy      History of Present Illness:  I had the pleasure of seeing Weston Edmond in follow up for Cardiomyopathy. Hx of Afib, HTN, HLD.    Admitted on 3/3/2024 with CHF. EKG showed rapid AF, previously diagnosed with plans for ablation. Echo showed an EF of 20-25%. He had an LHC with PCI of the RCA. He had a ALICIA/CV but promptly returned to AF. He went back into SR after amiodarone was started.  he was scheduled for a repeat cardioversion 3/20/2024 but was founf to be in SR, cardioversion cancelled.   Undwent AF Ablation on 4/19/24. Echo 8/6/24 showed LVEF 30% from 20-25%.   Admitted 9/24/24-9/27/24 with right foot cellulitis-    Since last visit, he had elevated potassium level and Spironolactone (aldactone) was reduced to 1/2 tab daily.   He has had some streaks of blood from the nose, no hemoptysis   No shortness of breath.   No chest pain.   No dizziness.  He is back off of work due to the foot wounds.   bP /60-70's   HR 60-70   Not having to take lasix as he hasn't had leg edema.     Home weights: 224-227 lbs- weight gradually increased    Taking medications as prescribed: Yes  Able to afford medications: Yes    Past Medical History:   has a past medical history of Arthritis, Charcot-Liliana-Tooth disease, CHF (congestive heart failure) (HCC), Class 1 obesity due to excess calories with body mass index (BMI) of 33.0 to 33.9 in adult, Coronary artery disease involving native coronary artery of native heart with angina pectoris (HCC), Hyperlipemia, Hypertension, Mixed hyperlipidemia, Observed sleep apnea, PAF (paroxysmal atrial fibrillation) (HCC), and Sleep apnea.  Surgical History:   has a past surgical history that includes Colonoscopy; Vasectomy; Breast biopsy; US BIOPSY THYROID (8/16/2021); Colonoscopy (N/A, 8/22/2023); cardioversion (3/7/2024); and

## 2025-01-11 NOTE — PROGRESS NOTES
Stage 11: Number Of Blocks?: 0 EST    Normal: 0.84 - 1.16  Therapeutic: 2.0 - 3.0  Pros. Valve: 2.5 - 3.5  AMI: 2.0 - 3.0       APTT: No components found for: \"PT2T\"  FASTING LIPID PANEL:   Lab Results   Component Value Date/Time    HDL 36 03/07/2024 01:37 AM    TRIG 73 03/07/2024 01:37 AM     LIVER PROFILE:No results for input(s): \"AST\", \"ALT\" in the last 72 hours.    Invalid input(s): \"ALB\"    IMPRESSION:    Patient Active Problem List   Diagnosis    Essential hypertension    CMT (Charcot-Liliana-Tooth disease)    Mixed hyperlipidemia    PAF (paroxysmal atrial fibrillation) (HCC)    Dilated cardiomyopathy (HCC)    ANSHU (obstructive sleep apnea)    Class 1 obesity due to excess calories with body mass index (BMI) of 33.0 to 33.9 in adult    Multiple thyroid nodules    Nocturnal hypoxemia    Atrial fibrillation with RVR (HCC)    Leukocytosis    Transaminitis    Elevated brain natriuretic peptide (BNP) level    Coronary artery disease involving native coronary artery of native heart with angina pectoris (HCC)    Overweight (BMI 25.0-29.9)    Ischemic cardiomyopathy    Cheyne-Mar respiration    Right foot infection       Assessment:   Paroxysmal atrial fibrillation              -[id number] score 3- HTN, low LVEF, vascular disease   -DCCV 3/11/2024   -Atrial fibrillation ablation 4/19/2024  Ischemic cardiomyopathy              -LVEF 20-25%--new this admission  CAD              -LOKESH left circumflex and RCA 3/2024  Hypertension  Hyperlipidemia  Sleep apnea  Elevated liver enzymes--likely due to congestion due to heart failure  Charcot Liliana tooth disease    Plan:   Continue Eliquis 5 mg twice daily for stroke risk reduction   Discontinue amiodarone 200 mg twice daily--for a total of 3 months post ablation (til 4/19/2024)  Continue Toprol-XL 25 mg twice daily  Continue Lasix 20 mg -as needed for 3 pound weight gain in 24 hours or 5 pound weight gain in 1 week  Continue Aldactone 12.5 mg daily  Continue Jardiance 10 mg daily   Continue Entresto 49-51

## 2025-01-13 ENCOUNTER — OFFICE VISIT (OUTPATIENT)
Dept: CARDIOLOGY CLINIC | Age: 60
End: 2025-01-13
Payer: COMMERCIAL

## 2025-01-13 VITALS
HEART RATE: 69 BPM | OXYGEN SATURATION: 99 % | WEIGHT: 229 LBS | SYSTOLIC BLOOD PRESSURE: 110 MMHG | BODY MASS INDEX: 29.39 KG/M2 | HEIGHT: 74 IN | DIASTOLIC BLOOD PRESSURE: 62 MMHG

## 2025-01-13 DIAGNOSIS — I25.119 CORONARY ARTERY DISEASE INVOLVING NATIVE CORONARY ARTERY OF NATIVE HEART WITH ANGINA PECTORIS (HCC): ICD-10-CM

## 2025-01-13 DIAGNOSIS — Z79.899 ON AMIODARONE THERAPY: ICD-10-CM

## 2025-01-13 DIAGNOSIS — I50.20 HFREF (HEART FAILURE WITH REDUCED EJECTION FRACTION) (HCC): ICD-10-CM

## 2025-01-13 DIAGNOSIS — I25.5 ISCHEMIC CARDIOMYOPATHY: ICD-10-CM

## 2025-01-13 DIAGNOSIS — I48.91 ATRIAL FIBRILLATION WITH RVR (HCC): ICD-10-CM

## 2025-01-13 DIAGNOSIS — I48.0 PAF (PAROXYSMAL ATRIAL FIBRILLATION) (HCC): Primary | ICD-10-CM

## 2025-01-13 LAB
ANION GAP SERPL CALCULATED.3IONS-SCNC: 12 MMOL/L (ref 3–16)
BUN SERPL-MCNC: 17 MG/DL (ref 7–20)
CALCIUM SERPL-MCNC: 9.7 MG/DL (ref 8.3–10.6)
CHLORIDE SERPL-SCNC: 106 MMOL/L (ref 99–110)
CO2 SERPL-SCNC: 26 MMOL/L (ref 21–32)
CREAT SERPL-MCNC: 1 MG/DL (ref 0.9–1.3)
GFR SERPLBLD CREATININE-BSD FMLA CKD-EPI: 87 ML/MIN/{1.73_M2}
GLUCOSE SERPL-MCNC: 102 MG/DL (ref 70–99)
POTASSIUM SERPL-SCNC: 4.9 MMOL/L (ref 3.5–5.1)
SODIUM SERPL-SCNC: 144 MMOL/L (ref 136–145)
TSH SERPL DL<=0.005 MIU/L-ACNC: 1.15 UIU/ML (ref 0.27–4.2)

## 2025-01-13 PROCEDURE — 3078F DIAST BP <80 MM HG: CPT

## 2025-01-13 PROCEDURE — 3074F SYST BP LT 130 MM HG: CPT

## 2025-01-13 PROCEDURE — 93000 ELECTROCARDIOGRAM COMPLETE: CPT

## 2025-01-13 PROCEDURE — 99214 OFFICE O/P EST MOD 30 MIN: CPT

## 2025-01-13 NOTE — PATIENT INSTRUCTIONS
Continue Eliquis 5 mg twice daily for stroke risk reduction   Discontinue amiodarone 200 mg twice daily--for a total of 3 months post ablation (til 7/19/2024)  Continue Toprol-XL 25 mg twice daily  Continue Lasix 20 mg -as needed for 3 pound weight gain in 24 hours or 5 pound weight gain in 1 week  Continue Aldactone 12.5 mg daily  Continue Jardiance 10 mg daily   Continue Entresto 49-51 mg twice daily  Continue Lipitor 40 mg daily  Continue Brilinta 90 mg twice daily  Limited echo reviewed  Daily weights  Daily blood pressure monitoring  Daily physical exercise as tolerated  Need TSH and BMP today    Follow up in 6 months  Sylvia VELASCO

## 2025-05-30 ASSESSMENT — SLEEP AND FATIGUE QUESTIONNAIRES
ESS TOTAL SCORE: 7
HOW LIKELY ARE YOU TO NOD OFF OR FALL ASLEEP WHILE SITTING INACTIVE IN A PUBLIC PLACE: WOULD NEVER DOZE
HOW LIKELY ARE YOU TO NOD OFF OR FALL ASLEEP WHILE SITTING AND READING: MODERATE CHANCE OF DOZING
HOW LIKELY ARE YOU TO NOD OFF OR FALL ASLEEP IN A CAR, WHILE STOPPED FOR A FEW MINUTES IN TRAFFIC: WOULD NEVER DOZE
HOW LIKELY ARE YOU TO NOD OFF OR FALL ASLEEP IN A CAR, WHILE STOPPED FOR A FEW MINUTES IN TRAFFIC: WOULD NEVER DOZE
HOW LIKELY ARE YOU TO NOD OFF OR FALL ASLEEP WHILE SITTING AND TALKING TO SOMEONE: WOULD NEVER DOZE
HOW LIKELY ARE YOU TO NOD OFF OR FALL ASLEEP WHILE SITTING QUIETLY AFTER LUNCH WITHOUT ALCOHOL: SLIGHT CHANCE OF DOZING
HOW LIKELY ARE YOU TO NOD OFF OR FALL ASLEEP WHILE WATCHING TV: MODERATE CHANCE OF DOZING
HOW LIKELY ARE YOU TO NOD OFF OR FALL ASLEEP WHILE LYING DOWN TO REST IN THE AFTERNOON WHEN CIRCUMSTANCES PERMIT: SLIGHT CHANCE OF DOZING
HOW LIKELY ARE YOU TO NOD OFF OR FALL ASLEEP WHILE WATCHING TV: MODERATE CHANCE OF DOZING
HOW LIKELY ARE YOU TO NOD OFF OR FALL ASLEEP WHILE SITTING QUIETLY AFTER LUNCH WITHOUT ALCOHOL: SLIGHT CHANCE OF DOZING
HOW LIKELY ARE YOU TO NOD OFF OR FALL ASLEEP WHILE SITTING AND TALKING TO SOMEONE: WOULD NEVER DOZE
HOW LIKELY ARE YOU TO NOD OFF OR FALL ASLEEP WHILE SITTING AND READING: MODERATE CHANCE OF DOZING
HOW LIKELY ARE YOU TO NOD OFF OR FALL ASLEEP WHEN YOU ARE A PASSENGER IN A CAR FOR AN HOUR WITHOUT A BREAK: SLIGHT CHANCE OF DOZING
HOW LIKELY ARE YOU TO NOD OFF OR FALL ASLEEP WHILE LYING DOWN TO REST IN THE AFTERNOON WHEN CIRCUMSTANCES PERMIT: SLIGHT CHANCE OF DOZING
HOW LIKELY ARE YOU TO NOD OFF OR FALL ASLEEP WHEN YOU ARE A PASSENGER IN A CAR FOR AN HOUR WITHOUT A BREAK: SLIGHT CHANCE OF DOZING
HOW LIKELY ARE YOU TO NOD OFF OR FALL ASLEEP WHILE SITTING INACTIVE IN A PUBLIC PLACE: WOULD NEVER DOZE

## 2025-06-02 ENCOUNTER — OFFICE VISIT (OUTPATIENT)
Dept: PULMONOLOGY | Age: 60
End: 2025-06-02
Payer: COMMERCIAL

## 2025-06-02 VITALS
OXYGEN SATURATION: 98 % | HEART RATE: 81 BPM | RESPIRATION RATE: 16 BRPM | SYSTOLIC BLOOD PRESSURE: 105 MMHG | DIASTOLIC BLOOD PRESSURE: 67 MMHG | TEMPERATURE: 97.3 F | HEIGHT: 73 IN | BODY MASS INDEX: 31.14 KG/M2 | WEIGHT: 235 LBS

## 2025-06-02 DIAGNOSIS — G47.33 OSA (OBSTRUCTIVE SLEEP APNEA): Primary | ICD-10-CM

## 2025-06-02 PROCEDURE — 3078F DIAST BP <80 MM HG: CPT | Performed by: INTERNAL MEDICINE

## 2025-06-02 PROCEDURE — 3074F SYST BP LT 130 MM HG: CPT | Performed by: INTERNAL MEDICINE

## 2025-06-02 PROCEDURE — 99214 OFFICE O/P EST MOD 30 MIN: CPT | Performed by: INTERNAL MEDICINE

## 2025-06-02 NOTE — PROGRESS NOTES
SLEEP MEDICINE FOLLOW UP NOTE  CC: Obstructive Sleep Apnea     Interval history  -  using CPAP with benefit.  Less sleepy.   Dreams.  Sleep quality improved.     Presenting history - [place] patient         5/30/2025     3:22 PM 1/17/2024     8:20 AM   Sleep Medicine   Sitting and reading 2 3   Watching TV 2 3   Sitting, inactive in a public place (e.g. a theatre or a meeting) 0 1   As a passenger in a car for an hour without a break 1 2   Lying down to rest in the afternoon when circumstances permit 1 1   Sitting and talking to someone 0 1   Sitting quietly after a lunch without alcohol 1 1   In a car, while stopped for a few minutes in traffic 0 1   [place] Sleepiness Score 7  13   Neck (Inches)  17       Patient-reported          PHYSICAL EXAM:  Blood pressure 105/67, pulse 81, temperature 97.3 °F (36.3 °C), temperature source Temporal, resp. rate 16, height 1.854 m (6' 1\"), weight 106.6 kg (235 lb), SpO2 98%.'   Constitutional:  No acute distress.   HENT:  Oropharynx is clear and moist. Mallampati class 4  Eyes: Pupils equal, round, and reactive to light. No scleral icterus.   Neck: No tracheal deviation present.  Circumference is 17 inches   Cardiovascular: Normal S1S2.  No lower extremity edema.  Pulmonary/Chest: Clear breath sounds.  No accessory muscle usage.   Musculoskeletal: No cyanosis. No clubbing.  Skin: Skin is warm and dry.   Psychiatric: Normal mood and affect.    DATA:  Sleep:  HST 1/22/2024 AHI 55, time below 89% 42 minutes, OAI 38, VEL 0    Compliance report from May 26, 2025 personally reviewed and summarized, used 29/30, average 8 hours, APAP 5-15, EPR 3, median 6, 95% 9.5, AHI 4.4, VEL 2.5    Cardiology:  Echocardiogram 11/21/2024 EF 40 to 45%, improved from 30%, regional hypokinesis    TSH 1.15 creatinine 1.1    ASSESSMENT:  Obstructive Sleep Apnea     PLAN:   Change from APAP 5-17 to 5-10, change EPR from 3 to 2 due to persistent central events.  No driving while sleepy  Weight loss is helpful

## 2025-06-02 NOTE — PATIENT INSTRUCTIONS
Please remember to bring a list of medications and any CPAP or BiPAP machines to your next appointment with the office.     Out of respect for other patients and providers, we ask that you arrive no later than your scheduled appointment time.  If you arrive later than your appointment time, you may be asked to reschedule your appointment.     Late cancellations result in other patients being unable to utilize the appointment slot to see their physician.  Please avoid cancelling your appointment less than 1 week prior to the appointment date.  Patients with multiple missed appointments within 2 years may be dismissed from the practice.     In the next few weeks, you will be receiving a survey from [facility] regarding your visit today.  Your input is valuable to us & surveys are regularly reviewed by [facility] leadership.  It is very important to us that our patients receive excellent care.  If your experience was excellent, please let us know!  If your experience was not a good one, please tell us so we can make needed corrections. We hope you are comfortable recommending us to others for their healthcare needs.     We thank you for choosing [facility]!

## 2025-06-02 NOTE — PROGRESS NOTES
MA Communication:  The following orders are received by verbal communication from Weston Haro MD        Orders include:          Pressure change to 5-10  EPR 2  1 year fiollow up

## 2025-07-28 NOTE — PROGRESS NOTES
per week. He reports that he does not use drugs.     Family History: family history includes Cancer in his father; Dementia in his sister; Diabetes in his mother; Heart Disease in his father; Other in his mother.    All 14 point review of systems are completed and pertinent positives are mentioned in the history of present illness. Other systems are reviewed and are negative.     PHYSICAL EXAM:    Vital signs:    /68   Pulse 75   Ht 1.854 m (6' 0.99\")   Wt 104.2 kg (229 lb 11.5 oz)   SpO2 98%   BMI 30.31 kg/m²      Constitutional and general appearance: alert, cooperative, no distress, and appears stated age  HEENT: PERRL, no cervical lymphadenopathy. No masses palpable. Normal oral mucosa  Respiratory:  Normal excursion and expansion without use of accessory muscles  Resp auscultation: Normal breath sounds without wheezing, rhonchi, and rales  Cardiovascular:  The apical impulse is not displaced  Heart tones are crisp and normal. regular S1 and S2.  Jugular venous pulsation Normal  The carotid upstroke is normal in amplitude and contour without delay or bruit  Peripheral pulses are symmetrical and full   Abdomen:  No masses or tenderness  Bowel sounds present  Extremities:   No cyanosis or clubbing   No lower extremity edema   Skin: warm and dry  Neurological:  Alert and oriented  Moves all extremities well  No abnormalities of mood, affect, memory, mentation, or behavior are noted    DATA:   ECG 7/31/2025  Sinus rhythm rate 75  ECG 1/13/25  Sinus rhythm rate 69  ECG 5/24/2024  SR rate 76    Echo 11/2024    Image quality is technically difficult. Contrast used: Definity.    Left Ventricle: Mildly reduced left ventricular systolic function with a visually estimated EF of 40 - 45%. EF by 2D Simpsons Biplane is 44%. Left ventricle size is normal. Normal wall thickness. There are regional wall motion abnormalities. Hypokinesis of the following segments:  apex, inferior, inferoseptal and anteroseptal. Normal

## 2025-07-31 ENCOUNTER — OFFICE VISIT (OUTPATIENT)
Dept: CARDIOLOGY CLINIC | Age: 60
End: 2025-07-31
Payer: COMMERCIAL

## 2025-07-31 VITALS
DIASTOLIC BLOOD PRESSURE: 66 MMHG | BODY MASS INDEX: 30.28 KG/M2 | HEART RATE: 82 BPM | OXYGEN SATURATION: 98 % | WEIGHT: 228.5 LBS | HEIGHT: 73 IN | SYSTOLIC BLOOD PRESSURE: 100 MMHG

## 2025-07-31 VITALS
WEIGHT: 229.72 LBS | BODY MASS INDEX: 30.45 KG/M2 | SYSTOLIC BLOOD PRESSURE: 100 MMHG | DIASTOLIC BLOOD PRESSURE: 68 MMHG | HEART RATE: 75 BPM | OXYGEN SATURATION: 98 % | HEIGHT: 73 IN

## 2025-07-31 DIAGNOSIS — E78.2 MIXED HYPERLIPIDEMIA: ICD-10-CM

## 2025-07-31 DIAGNOSIS — I25.5 ISCHEMIC CARDIOMYOPATHY: ICD-10-CM

## 2025-07-31 DIAGNOSIS — I50.22 CHRONIC SYSTOLIC CONGESTIVE HEART FAILURE (HCC): ICD-10-CM

## 2025-07-31 DIAGNOSIS — I25.119 CORONARY ARTERY DISEASE INVOLVING NATIVE CORONARY ARTERY OF NATIVE HEART WITH ANGINA PECTORIS: ICD-10-CM

## 2025-07-31 DIAGNOSIS — I48.0 PAF (PAROXYSMAL ATRIAL FIBRILLATION) (HCC): Primary | ICD-10-CM

## 2025-07-31 DIAGNOSIS — I10 ESSENTIAL HYPERTENSION: ICD-10-CM

## 2025-07-31 DIAGNOSIS — I42.9 CARDIOMYOPATHY, UNSPECIFIED TYPE (HCC): Primary | ICD-10-CM

## 2025-07-31 PROCEDURE — 3074F SYST BP LT 130 MM HG: CPT

## 2025-07-31 PROCEDURE — G2211 COMPLEX E/M VISIT ADD ON: HCPCS

## 2025-07-31 PROCEDURE — 93000 ELECTROCARDIOGRAM COMPLETE: CPT

## 2025-07-31 PROCEDURE — 99214 OFFICE O/P EST MOD 30 MIN: CPT | Performed by: NURSE PRACTITIONER

## 2025-07-31 PROCEDURE — G2211 COMPLEX E/M VISIT ADD ON: HCPCS | Performed by: NURSE PRACTITIONER

## 2025-07-31 PROCEDURE — 3078F DIAST BP <80 MM HG: CPT | Performed by: NURSE PRACTITIONER

## 2025-07-31 PROCEDURE — 3078F DIAST BP <80 MM HG: CPT

## 2025-07-31 PROCEDURE — 3074F SYST BP LT 130 MM HG: CPT | Performed by: NURSE PRACTITIONER

## 2025-07-31 PROCEDURE — 99214 OFFICE O/P EST MOD 30 MIN: CPT

## 2025-07-31 RX ORDER — ROSUVASTATIN CALCIUM 20 MG/1
20 TABLET, COATED ORAL DAILY
Qty: 90 TABLET | Refills: 1 | Status: SHIPPED | OUTPATIENT
Start: 2025-07-31

## 2025-07-31 RX ORDER — SPIRONOLACTONE 25 MG/1
12.5 TABLET ORAL DAILY
Qty: 45 TABLET | Refills: 3 | Status: SHIPPED | OUTPATIENT
Start: 2025-07-31

## 2025-07-31 NOTE — PATIENT INSTRUCTIONS
Continue Eliquis 5 mg twice daily for stroke risk reduction   Continue Toprol-XL 25 mg twice daily  Continue Lasix 20 mg -as needed for 3 pound weight gain in 24 hours or 5 pound weight gain in 1 week  Continue Aldactone 12.5 mg daily  Continue Jardiance 10 mg daily   Continue Entresto 49-51 mg twice daily  Continue Crestor 20 mg daily  Continue Plavix 75mg daily  Please call to schedule echo for later in the fall   Daily weights  Daily blood pressure monitoring  Daily physical exercise as tolerated

## 2025-07-31 NOTE — PROGRESS NOTES
03/07/2024 94 0 - 199 mg/dL Final                   Triglycerides   Date Value Ref Range Status   03/07/2024 73 0 - 150 mg/dL Final                   HDL   Date Value Ref Range Status   03/07/2024 36 (L) 40 - 60 mg/dL Final     Comment:     An HDL cholesterol less than 40 mg/dL is low and  constitutes a coronary heart disease risk factor.  An HDL cholesterol greater than 60 mg/dL is a  negative risk factor for coronary heart disease.                   No components found for: \"LDLCHOLESTEROL\", \"LDLCALC\"                VLDL Cholesterol Calculated   Date Value Ref Range Status   03/07/2024 15 Not Established mg/dL Final                 No results found for: \"CHOLHDLRATIO\"    EF:   Lab Results   Component Value Date/Time    LVEF 50 12/18/2019 02:26 PM       Testing reviewed:   Stress echocardiogram 12/2019   Conclusions   Summary   Excellent functional capacity at 10 METS   Normal ekg response   Technically difficult study due to poor acoustic windows   Grossly no obvious wall motion abnormalities were noted to suggest ischemia   Consider additional testing for follow up such as nuclear stress test or   stress echo with contrast     Echocardiogram 9/12/2023  Summary   Definity® used for myocardial border enhancement.   Low normal left ventricular systolic function with estimated ejection fraction of 50-55%.   There is hypokinesis of the apical anterior and apical septal walls.   The left ventricle is normal in size with normal wall thickness.   Indeterminate diastolic function. Patient was in atrial fibrillation during study.   There is no evidence of a left ventricular thrombus.   Normal RV size and systolic function.   The right atrium is mildly dilated.   Trivial mitral regurgitation.     Stress test 10/5/2023   Summary   Excellent functional capacity at 10 METs   Mild to moderately reduced LVEF 46% (EF may be underestimated on this study)   Global hypokinesis   Diaphragmatic attenuation artifact   No ischemia

## 2025-07-31 NOTE — PATIENT INSTRUCTIONS
Plan:   Daily weights  lasix to as needed for weight gain 3lbs in a day  Spironolactone (aldactone) 12.5 mg daily   Stop lipitor and change to the crestor 20mg daily   Continue toprol  Continue jardiance  Continue entresto 49/51mg twice a day  Continue plavix and eliquis  Repeat Labs in October, CMP, lipids, CBC  Call to schedule for this fall [phone]   Stay as active as possible  Follow up 6 months

## 2025-09-04 ENCOUNTER — PATIENT MESSAGE (OUTPATIENT)
Dept: CARDIOLOGY CLINIC | Age: 60
End: 2025-09-04

## 2025-09-04 RX ORDER — SACUBITRIL AND VALSARTAN 49; 51 MG/1; MG/1
1 TABLET ORAL 2 TIMES DAILY
Qty: 180 TABLET | Refills: 3 | Status: SHIPPED | OUTPATIENT
Start: 2025-09-04

## 2025-09-06 RX ORDER — APIXABAN 5 MG/1
5 TABLET, FILM COATED ORAL 2 TIMES DAILY
Qty: 180 TABLET | Refills: 1 | Status: SHIPPED | OUTPATIENT
Start: 2025-09-06

## (undated) DEVICE — SOLUTION IRRIG 2000ML 0.9% SOD CHL USP UROMATIC PLAS CONT

## (undated) DEVICE — CANNULA NSL AD TBNG L7FT PVC STR NONFLARED PRNG O2 DEL W STD

## (undated) DEVICE — LOWER EXTREMITY: Brand: MEDLINE INDUSTRIES, INC.

## (undated) DEVICE — Device: Brand: JELCO

## (undated) DEVICE — PRECISION THIN (9.0 X 0.38 X 25.0MM)

## (undated) DEVICE — SYRINGE LL 10CC

## (undated) DEVICE — BANDAGE,GAUZE,4.5"X4.1YD,STERILE,LF: Brand: MEDLINE

## (undated) DEVICE — SUTURE NONABSORBABLE MONOFILAMENT 3-0 PS-1 18 IN BLK ETHILON 1663H

## (undated) DEVICE — Z INACTIVE USE 2863696 DRESSING PETRO W3XL8IN N ADH OIL EMUL GZ CURAD

## (undated) DEVICE — ENDOSCOPIC KIT 2 12 FT OP4 DE2 GWN SYR

## (undated) DEVICE — STOCKINETTE,IMPERVIOUS,12X48,STERILE: Brand: MEDLINE

## (undated) DEVICE — GLOVE SURG SZ 65 THK91MIL LTX FREE SYN POLYISOPRENE

## (undated) DEVICE — STANDARD HYPODERMIC NEEDLE,POLYPROPYLENE HUB: Brand: MONOJECT

## (undated) DEVICE — BASIC SINGLE BASIN 1-LF: Brand: MEDLINE INDUSTRIES, INC.

## (undated) DEVICE — GLOVE ORANGE PI 7   MSG9070

## (undated) DEVICE — PREMIUM WET SKIN PREP TRAY: Brand: MEDLINE INDUSTRIES, INC.

## (undated) DEVICE — ELECTRODE,ECG,STRESS,FOAM,3PK: Brand: MEDLINE